# Patient Record
Sex: FEMALE | Race: BLACK OR AFRICAN AMERICAN | NOT HISPANIC OR LATINO | Employment: FULL TIME | URBAN - METROPOLITAN AREA
[De-identification: names, ages, dates, MRNs, and addresses within clinical notes are randomized per-mention and may not be internally consistent; named-entity substitution may affect disease eponyms.]

---

## 2017-03-06 ENCOUNTER — COMMUNICATION - HEALTHEAST (OUTPATIENT)
Dept: FAMILY MEDICINE | Facility: CLINIC | Age: 32
End: 2017-03-06

## 2017-05-19 ENCOUNTER — OFFICE VISIT - HEALTHEAST (OUTPATIENT)
Dept: FAMILY MEDICINE | Facility: CLINIC | Age: 32
End: 2017-05-19

## 2017-05-19 DIAGNOSIS — E55.9 VITAMIN D DEFICIENCY: ICD-10-CM

## 2017-05-19 DIAGNOSIS — E78.5 HYPERLIPIDEMIA: ICD-10-CM

## 2017-05-19 DIAGNOSIS — Z23 IMMUNIZATION DUE: ICD-10-CM

## 2017-05-19 LAB
CHOLEST SERPL-MCNC: 220 MG/DL
FASTING STATUS PATIENT QL REPORTED: YES
HDLC SERPL-MCNC: 36 MG/DL
LDLC SERPL CALC-MCNC: 161 MG/DL
TRIGL SERPL-MCNC: 115 MG/DL

## 2017-06-12 ENCOUNTER — COMMUNICATION - HEALTHEAST (OUTPATIENT)
Dept: FAMILY MEDICINE | Facility: CLINIC | Age: 32
End: 2017-06-12

## 2017-11-24 ENCOUNTER — OFFICE VISIT - HEALTHEAST (OUTPATIENT)
Dept: FAMILY MEDICINE | Facility: CLINIC | Age: 32
End: 2017-11-24

## 2017-11-24 DIAGNOSIS — Z00.00 HEALTH CARE MAINTENANCE: ICD-10-CM

## 2017-11-24 ASSESSMENT — MIFFLIN-ST. JEOR: SCORE: 1886.13

## 2017-12-11 ENCOUNTER — COMMUNICATION - HEALTHEAST (OUTPATIENT)
Dept: FAMILY MEDICINE | Facility: CLINIC | Age: 32
End: 2017-12-11

## 2017-12-11 ENCOUNTER — AMBULATORY - HEALTHEAST (OUTPATIENT)
Dept: FAMILY MEDICINE | Facility: CLINIC | Age: 32
End: 2017-12-11

## 2017-12-11 DIAGNOSIS — Z31.41 FERTILITY TESTING: ICD-10-CM

## 2018-01-02 ENCOUNTER — RECORDS - HEALTHEAST (OUTPATIENT)
Dept: ADMINISTRATIVE | Facility: OTHER | Age: 33
End: 2018-01-02

## 2018-01-05 ENCOUNTER — RECORDS - HEALTHEAST (OUTPATIENT)
Dept: ADMINISTRATIVE | Facility: OTHER | Age: 33
End: 2018-01-05

## 2018-01-07 ENCOUNTER — RECORDS - HEALTHEAST (OUTPATIENT)
Dept: LAB | Facility: CLINIC | Age: 33
End: 2018-01-07

## 2018-01-07 LAB
ESTRADIOL SERPL-MCNC: 21 PG/ML
FSH SERPL-ACNC: 3.9 MIU/ML
PROLACTIN SERPL-MCNC: 9.7 NG/ML (ref 0–20)

## 2018-01-12 ENCOUNTER — HOSPITAL ENCOUNTER (OUTPATIENT)
Dept: RADIOLOGY | Facility: CLINIC | Age: 33
Discharge: HOME | End: 2018-01-12
Attending: OBSTETRICS & GYNECOLOGY

## 2018-01-12 DIAGNOSIS — Z31.41 ENCOUNTER FOR FERTILITY TESTING: ICD-10-CM

## 2018-01-23 ENCOUNTER — COMMUNICATION - HEALTHEAST (OUTPATIENT)
Dept: FAMILY MEDICINE | Facility: CLINIC | Age: 33
End: 2018-01-23

## 2018-01-25 ENCOUNTER — RECORDS - HEALTHEAST (OUTPATIENT)
Dept: ADMINISTRATIVE | Facility: OTHER | Age: 33
End: 2018-01-25

## 2018-01-26 ENCOUNTER — COMMUNICATION - HEALTHEAST (OUTPATIENT)
Dept: ADMINISTRATIVE | Facility: CLINIC | Age: 33
End: 2018-01-26

## 2018-01-26 DIAGNOSIS — N97.9 INFERTILITY, FEMALE: ICD-10-CM

## 2018-02-22 ENCOUNTER — RECORDS - HEALTHEAST (OUTPATIENT)
Dept: ADMINISTRATIVE | Facility: OTHER | Age: 33
End: 2018-02-22

## 2018-04-16 ENCOUNTER — COMMUNICATION - HEALTHEAST (OUTPATIENT)
Dept: ADMINISTRATIVE | Facility: CLINIC | Age: 33
End: 2018-04-16

## 2018-07-16 ENCOUNTER — OFFICE VISIT - HEALTHEAST (OUTPATIENT)
Dept: FAMILY MEDICINE | Facility: CLINIC | Age: 33
End: 2018-07-16

## 2018-07-16 DIAGNOSIS — J32.9 SINUSITIS: ICD-10-CM

## 2018-07-16 LAB — DEPRECATED S PYO AG THROAT QL EIA: NORMAL

## 2018-07-17 LAB — GROUP A STREP BY PCR: NORMAL

## 2018-09-06 ENCOUNTER — RECORDS - HEALTHEAST (OUTPATIENT)
Dept: ADMINISTRATIVE | Facility: OTHER | Age: 33
End: 2018-09-06

## 2018-11-09 ENCOUNTER — COMMUNICATION - HEALTHEAST (OUTPATIENT)
Dept: FAMILY MEDICINE | Facility: CLINIC | Age: 33
End: 2018-11-09

## 2019-01-04 ENCOUNTER — OFFICE VISIT - HEALTHEAST (OUTPATIENT)
Dept: FAMILY MEDICINE | Facility: CLINIC | Age: 34
End: 2019-01-04

## 2019-01-04 ENCOUNTER — TRANSFERRED RECORDS (OUTPATIENT)
Dept: HEALTH INFORMATION MANAGEMENT | Facility: CLINIC | Age: 34
End: 2019-01-04

## 2019-01-04 DIAGNOSIS — Z12.4 SCREENING FOR MALIGNANT NEOPLASM OF CERVIX: ICD-10-CM

## 2019-01-04 DIAGNOSIS — Z00.00 ROUTINE HEALTH MAINTENANCE: ICD-10-CM

## 2019-01-04 DIAGNOSIS — Z13.220 SCREENING FOR LIPID DISORDERS: ICD-10-CM

## 2019-01-04 DIAGNOSIS — Z13.1 SCREENING FOR DIABETES MELLITUS: ICD-10-CM

## 2019-01-04 ASSESSMENT — MIFFLIN-ST. JEOR: SCORE: 1867.08

## 2019-01-07 LAB
HPV SOURCE: NORMAL
HUMAN PAPILLOMA VIRUS 16 DNA: NEGATIVE
HUMAN PAPILLOMA VIRUS 18 DNA: NEGATIVE
HUMAN PAPILLOMA VIRUS FINAL DIAGNOSIS: NORMAL
HUMAN PAPILLOMA VIRUS OTHER HR: NEGATIVE
SPECIMEN DESCRIPTION: NORMAL

## 2019-01-29 ENCOUNTER — AMBULATORY - HEALTHEAST (OUTPATIENT)
Dept: LAB | Facility: CLINIC | Age: 34
End: 2019-01-29

## 2019-01-29 DIAGNOSIS — Z13.1 SCREENING FOR DIABETES MELLITUS: ICD-10-CM

## 2019-01-29 DIAGNOSIS — Z13.220 SCREENING FOR LIPID DISORDERS: ICD-10-CM

## 2019-01-29 LAB
ANION GAP SERPL CALCULATED.3IONS-SCNC: 10 MMOL/L (ref 5–18)
BUN SERPL-MCNC: 9 MG/DL (ref 8–22)
CALCIUM SERPL-MCNC: 9.6 MG/DL (ref 8.5–10.5)
CHLORIDE BLD-SCNC: 103 MMOL/L (ref 98–107)
CHOLEST SERPL-MCNC: 210 MG/DL
CO2 SERPL-SCNC: 26 MMOL/L (ref 22–31)
CREAT SERPL-MCNC: 0.82 MG/DL (ref 0.6–1.1)
FASTING STATUS PATIENT QL REPORTED: YES
GFR SERPL CREATININE-BSD FRML MDRD: >60 ML/MIN/1.73M2
GLUCOSE BLD-MCNC: 96 MG/DL (ref 70–125)
HDLC SERPL-MCNC: 41 MG/DL
LDLC SERPL CALC-MCNC: 142 MG/DL
POTASSIUM BLD-SCNC: 4.3 MMOL/L (ref 3.5–5)
SODIUM SERPL-SCNC: 139 MMOL/L (ref 136–145)
TRIGL SERPL-MCNC: 134 MG/DL

## 2019-03-01 ENCOUNTER — OFFICE VISIT - HEALTHEAST (OUTPATIENT)
Dept: FAMILY MEDICINE | Facility: CLINIC | Age: 34
End: 2019-03-01

## 2019-03-01 DIAGNOSIS — N97.9 FEMALE INFERTILITY: ICD-10-CM

## 2019-03-01 DIAGNOSIS — Z01.818 ENCOUNTER FOR PREOPERATIVE EXAMINATION FOR GENERAL SURGICAL PROCEDURE: ICD-10-CM

## 2019-03-01 LAB — HGB BLD-MCNC: 13.8 G/DL (ref 12–16)

## 2019-03-18 ASSESSMENT — MIFFLIN-ST. JEOR: SCORE: 1880.23

## 2019-03-19 ENCOUNTER — TRANSFERRED RECORDS (OUTPATIENT)
Dept: HEALTH INFORMATION MANAGEMENT | Facility: CLINIC | Age: 34
End: 2019-03-19

## 2019-03-19 ENCOUNTER — ANESTHESIA - HEALTHEAST (OUTPATIENT)
Dept: SURGERY | Facility: CLINIC | Age: 34
End: 2019-03-19

## 2019-03-19 ENCOUNTER — SURGERY - HEALTHEAST (OUTPATIENT)
Dept: SURGERY | Facility: CLINIC | Age: 34
End: 2019-03-19

## 2019-03-19 ASSESSMENT — MIFFLIN-ST. JEOR: SCORE: 1880.23

## 2019-05-12 ENCOUNTER — HOSPITAL ENCOUNTER (OUTPATIENT)
Dept: ULTRASOUND IMAGING | Facility: CLINIC | Age: 34
Discharge: HOME | End: 2019-05-12
Attending: FAMILY MEDICINE

## 2019-05-12 ENCOUNTER — OFFICE VISIT - HEALTHEAST (OUTPATIENT)
Dept: FAMILY MEDICINE | Facility: CLINIC | Age: 34
End: 2019-05-12

## 2019-05-12 DIAGNOSIS — M79.661 RIGHT CALF PAIN: ICD-10-CM

## 2019-06-27 ENCOUNTER — RECORDS - HEALTHEAST (OUTPATIENT)
Dept: ADMINISTRATIVE | Facility: OTHER | Age: 34
End: 2019-06-27

## 2019-06-27 LAB
ALT SERPL W/O P-5'-P-CCNC: 13 U/L (ref 6–29)
AST SERPL-CCNC: 12 U/L (ref 10–30)
CREAT SERPL-MCNC: 0.78 MG/DL (ref 0.5–1.1)
GFR ESTIMATE EXT - HISTORICAL: 101 ML/MIN/1.73M2
GFR ESTIMATE, IF BLACK EXT - HISTORICAL: 117 ML/MIN/1.73M2
HBA1C MFR BLD: 5.8 % (ref 0–5.6)
HIV 1&2 EXT: NORMAL

## 2019-06-28 LAB
C TRACH DNA SPEC QL PROBE+SIG AMP: NOT DETECTED
HBV SURFACE AG SERPL QL IA: NONREACTIVE
HCT VFR BLD AUTO: 41.7 %
HEMOGLOBIN: 14 G/DL (ref 11.7–15.7)
HIV 1+2 AB+HIV1 P24 AG SERPL QL IA: NONREACTIVE
N GONORRHOEA DNA SPEC QL PROBE+SIG AMP: NOT DETECTED
PLATELET # BLD AUTO: 352 10^9/L
RUBELLA ABY IGG: NORMAL
RUBELLA ANTIBODY IGG QUANTITATIVE: 2.53 IU/ML
TREPONEMA ANTIBODIES: NONREACTIVE
TSH SERPL-ACNC: 0.76 MCU/ML

## 2019-07-19 ENCOUNTER — AMBULATORY - HEALTHEAST (OUTPATIENT)
Dept: MATERNAL FETAL MEDICINE | Facility: HOSPITAL | Age: 34
End: 2019-07-19

## 2019-07-19 DIAGNOSIS — O26.90 PREGNANCY, ANTEPARTUM, COMPLICATIONS: ICD-10-CM

## 2019-08-16 ENCOUNTER — RECORDS - HEALTHEAST (OUTPATIENT)
Dept: ADMINISTRATIVE | Facility: OTHER | Age: 34
End: 2019-08-16

## 2019-08-30 ENCOUNTER — AMBULATORY - HEALTHEAST (OUTPATIENT)
Dept: MATERNAL FETAL MEDICINE | Facility: HOSPITAL | Age: 34
End: 2019-08-30

## 2019-09-04 ENCOUNTER — ANESTHESIA (OUTPATIENT)
Dept: OBGYN | Facility: CLINIC | Age: 34
End: 2019-09-04
Payer: COMMERCIAL

## 2019-09-04 ENCOUNTER — RECORDS - HEALTHEAST (OUTPATIENT)
Dept: ADMINISTRATIVE | Facility: OTHER | Age: 34
End: 2019-09-04

## 2019-09-04 ENCOUNTER — ANESTHESIA EVENT (OUTPATIENT)
Dept: OBGYN | Facility: CLINIC | Age: 34
End: 2019-09-04
Payer: COMMERCIAL

## 2019-09-04 ENCOUNTER — HOSPITAL ENCOUNTER (OUTPATIENT)
Facility: CLINIC | Age: 34
Setting detail: OBSERVATION
Discharge: HOME | End: 2019-09-05
Attending: OBSTETRICS & GYNECOLOGY | Admitting: OBSTETRICS & GYNECOLOGY
Payer: COMMERCIAL

## 2019-09-04 ENCOUNTER — OFFICE VISIT - HEALTHEAST (OUTPATIENT)
Dept: MATERNAL FETAL MEDICINE | Facility: HOSPITAL | Age: 34
End: 2019-09-04

## 2019-09-04 ENCOUNTER — RECORDS - HEALTHEAST (OUTPATIENT)
Dept: ULTRASOUND IMAGING | Facility: HOSPITAL | Age: 34
End: 2019-09-04

## 2019-09-04 DIAGNOSIS — O34.30 SHIRODKAR CERCLAGE PRESENT: Primary | ICD-10-CM

## 2019-09-04 DIAGNOSIS — O09.812 PREGNANCY RESULTING FROM IN VITRO FERTILIZATION IN SECOND TRIMESTER: ICD-10-CM

## 2019-09-04 DIAGNOSIS — O26.90 PREGNANCY RELATED CONDITIONS, UNSPECIFIED, UNSPECIFIED TRIMESTER: ICD-10-CM

## 2019-09-04 DIAGNOSIS — O34.32 CERVICAL INSUFFICIENCY DURING PREGNANCY IN SECOND TRIMESTER, ANTEPARTUM: ICD-10-CM

## 2019-09-04 PROBLEM — O26.879 SHORT CERVIX AFFECTING PREGNANCY: Status: ACTIVE | Noted: 2019-09-04

## 2019-09-04 PROBLEM — Z36.89 ENCOUNTER FOR TRIAGE IN PREGNANT PATIENT: Status: ACTIVE | Noted: 2019-09-04

## 2019-09-04 LAB
ABO + RH BLD: NORMAL
ABO + RH BLD: NORMAL
ALBUMIN UR-MCNC: NEGATIVE MG/DL
APPEARANCE UR: CLEAR
BASOPHILS # BLD AUTO: 0 10E9/L (ref 0–0.2)
BASOPHILS NFR BLD AUTO: 0.1 %
BILIRUB UR QL STRIP: NEGATIVE
BLD GP AB SCN SERPL QL: NORMAL
BLOOD BANK CMNT PATIENT-IMP: NORMAL
COLOR UR AUTO: ABNORMAL
DIFFERENTIAL METHOD BLD: NORMAL
EOSINOPHIL # BLD AUTO: 0 10E9/L (ref 0–0.7)
EOSINOPHIL NFR BLD AUTO: 0.4 %
ERYTHROCYTE [DISTWIDTH] IN BLOOD BY AUTOMATED COUNT: 13.8 % (ref 10–15)
GLUCOSE UR STRIP-MCNC: NEGATIVE MG/DL
HCT VFR BLD AUTO: 40.6 % (ref 35–47)
HGB BLD-MCNC: 13 G/DL (ref 11.7–15.7)
HGB UR QL STRIP: NEGATIVE
IMM GRANULOCYTES # BLD: 0 10E9/L (ref 0–0.4)
IMM GRANULOCYTES NFR BLD: 0.3 %
KETONES UR STRIP-MCNC: NEGATIVE MG/DL
LEUKOCYTE ESTERASE UR QL STRIP: NEGATIVE
LYMPHOCYTES # BLD AUTO: 1.8 10E9/L (ref 0.8–5.3)
LYMPHOCYTES NFR BLD AUTO: 17.3 %
MCH RBC QN AUTO: 29.3 PG (ref 26.5–33)
MCHC RBC AUTO-ENTMCNC: 32 G/DL (ref 31.5–36.5)
MCV RBC AUTO: 92 FL (ref 78–100)
MONOCYTES # BLD AUTO: 0.6 10E9/L (ref 0–1.3)
MONOCYTES NFR BLD AUTO: 6.1 %
NEUTROPHILS # BLD AUTO: 7.9 10E9/L (ref 1.6–8.3)
NEUTROPHILS NFR BLD AUTO: 75.8 %
NITRATE UR QL: NEGATIVE
NRBC # BLD AUTO: 0 10*3/UL
NRBC BLD AUTO-RTO: 0 /100
PH UR STRIP: 7.5 PH (ref 5–7)
PLATELET # BLD AUTO: 290 10E9/L (ref 150–450)
RBC # BLD AUTO: 4.43 10E12/L (ref 3.8–5.2)
RBC #/AREA URNS AUTO: 0 /HPF (ref 0–2)
SOURCE: ABNORMAL
SP GR UR STRIP: 1 (ref 1–1.03)
SPECIMEN EXP DATE BLD: NORMAL
SPECIMEN SOURCE: NORMAL
UROBILINOGEN UR STRIP-MCNC: NORMAL MG/DL (ref 0–2)
WBC # BLD AUTO: 10.4 10E9/L (ref 4–11)
WBC #/AREA URNS AUTO: <1 /HPF (ref 0–5)
WET PREP SPEC: NORMAL

## 2019-09-04 PROCEDURE — 71000014 ZZH RECOVERY PHASE 1 LEVEL 2 FIRST HR: Performed by: OBSTETRICS & GYNECOLOGY

## 2019-09-04 PROCEDURE — 25000128 H RX IP 250 OP 636: Performed by: STUDENT IN AN ORGANIZED HEALTH CARE EDUCATION/TRAINING PROGRAM

## 2019-09-04 PROCEDURE — 25800030 ZZH RX IP 258 OP 636: Performed by: STUDENT IN AN ORGANIZED HEALTH CARE EDUCATION/TRAINING PROGRAM

## 2019-09-04 PROCEDURE — 86900 BLOOD TYPING SEROLOGIC ABO: CPT | Performed by: STUDENT IN AN ORGANIZED HEALTH CARE EDUCATION/TRAINING PROGRAM

## 2019-09-04 PROCEDURE — 27211024 ZZHC OR SUPPLY OTHER OPNP: Performed by: OBSTETRICS & GYNECOLOGY

## 2019-09-04 PROCEDURE — 25800030 ZZH RX IP 258 OP 636: Performed by: OBSTETRICS & GYNECOLOGY

## 2019-09-04 PROCEDURE — 87491 CHLMYD TRACH DNA AMP PROBE: CPT | Performed by: STUDENT IN AN ORGANIZED HEALTH CARE EDUCATION/TRAINING PROGRAM

## 2019-09-04 PROCEDURE — 81001 URINALYSIS AUTO W/SCOPE: CPT | Performed by: STUDENT IN AN ORGANIZED HEALTH CARE EDUCATION/TRAINING PROGRAM

## 2019-09-04 PROCEDURE — 25800030 ZZH RX IP 258 OP 636

## 2019-09-04 PROCEDURE — 96360 HYDRATION IV INFUSION INIT: CPT | Mod: 59

## 2019-09-04 PROCEDURE — 25000132 ZZH RX MED GY IP 250 OP 250 PS 637: Performed by: STUDENT IN AN ORGANIZED HEALTH CARE EDUCATION/TRAINING PROGRAM

## 2019-09-04 PROCEDURE — 36000047 ZZH SURGERY LEVEL 1 EA 15 ADDTL MIN - UMMC: Performed by: OBSTETRICS & GYNECOLOGY

## 2019-09-04 PROCEDURE — 87210 SMEAR WET MOUNT SALINE/INK: CPT | Performed by: STUDENT IN AN ORGANIZED HEALTH CARE EDUCATION/TRAINING PROGRAM

## 2019-09-04 PROCEDURE — 25000132 ZZH RX MED GY IP 250 OP 250 PS 637: Performed by: OBSTETRICS & GYNECOLOGY

## 2019-09-04 PROCEDURE — 25000128 H RX IP 250 OP 636: Performed by: OBSTETRICS & GYNECOLOGY

## 2019-09-04 PROCEDURE — 86850 RBC ANTIBODY SCREEN: CPT | Performed by: STUDENT IN AN ORGANIZED HEALTH CARE EDUCATION/TRAINING PROGRAM

## 2019-09-04 PROCEDURE — 25000128 H RX IP 250 OP 636

## 2019-09-04 PROCEDURE — 87086 URINE CULTURE/COLONY COUNT: CPT | Performed by: STUDENT IN AN ORGANIZED HEALTH CARE EDUCATION/TRAINING PROGRAM

## 2019-09-04 PROCEDURE — 87591 N.GONORRHOEAE DNA AMP PROB: CPT | Performed by: STUDENT IN AN ORGANIZED HEALTH CARE EDUCATION/TRAINING PROGRAM

## 2019-09-04 PROCEDURE — G0378 HOSPITAL OBSERVATION PER HR: HCPCS

## 2019-09-04 PROCEDURE — 86901 BLOOD TYPING SEROLOGIC RH(D): CPT | Performed by: STUDENT IN AN ORGANIZED HEALTH CARE EDUCATION/TRAINING PROGRAM

## 2019-09-04 PROCEDURE — 71000015 ZZH RECOVERY PHASE 1 LEVEL 2 EA ADDTL HR: Performed by: OBSTETRICS & GYNECOLOGY

## 2019-09-04 PROCEDURE — 36000045 ZZH SURGERY LEVEL 1 1ST 30 MIN - UMMC: Performed by: OBSTETRICS & GYNECOLOGY

## 2019-09-04 PROCEDURE — 40000170 ZZH STATISTIC PRE-PROCEDURE ASSESSMENT II: Performed by: OBSTETRICS & GYNECOLOGY

## 2019-09-04 PROCEDURE — 36415 COLL VENOUS BLD VENIPUNCTURE: CPT | Performed by: STUDENT IN AN ORGANIZED HEALTH CARE EDUCATION/TRAINING PROGRAM

## 2019-09-04 PROCEDURE — 27210794 ZZH OR GENERAL SUPPLY STERILE: Performed by: OBSTETRICS & GYNECOLOGY

## 2019-09-04 PROCEDURE — 37000009 ZZH ANESTHESIA TECHNICAL FEE, EACH ADDTL 15 MIN: Performed by: OBSTETRICS & GYNECOLOGY

## 2019-09-04 PROCEDURE — 37000008 ZZH ANESTHESIA TECHNICAL FEE, 1ST 30 MIN: Performed by: OBSTETRICS & GYNECOLOGY

## 2019-09-04 PROCEDURE — 85025 COMPLETE CBC W/AUTO DIFF WBC: CPT | Performed by: STUDENT IN AN ORGANIZED HEALTH CARE EDUCATION/TRAINING PROGRAM

## 2019-09-04 RX ORDER — ONDANSETRON 4 MG/1
4 TABLET, ORALLY DISINTEGRATING ORAL EVERY 30 MIN PRN
Status: DISCONTINUED | OUTPATIENT
Start: 2019-09-04 | End: 2019-09-04 | Stop reason: HOSPADM

## 2019-09-04 RX ORDER — ONDANSETRON 2 MG/ML
4 INJECTION INTRAMUSCULAR; INTRAVENOUS EVERY 30 MIN PRN
Status: DISCONTINUED | OUTPATIENT
Start: 2019-09-04 | End: 2019-09-04 | Stop reason: HOSPADM

## 2019-09-04 RX ORDER — INDOMETHACIN 25 MG/1
25 CAPSULE ORAL EVERY 6 HOURS
Status: DISCONTINUED | OUTPATIENT
Start: 2019-09-05 | End: 2019-09-05 | Stop reason: HOSPADM

## 2019-09-04 RX ORDER — PRENATAL VIT/IRON FUM/FOLIC AC 27MG-0.8MG
1 TABLET ORAL DAILY
Status: DISCONTINUED | OUTPATIENT
Start: 2019-09-04 | End: 2019-09-05 | Stop reason: HOSPADM

## 2019-09-04 RX ORDER — ACETAMINOPHEN 325 MG/1
650 TABLET ORAL EVERY 4 HOURS PRN
Status: DISCONTINUED | OUTPATIENT
Start: 2019-09-04 | End: 2019-09-05 | Stop reason: HOSPADM

## 2019-09-04 RX ORDER — LIDOCAINE 40 MG/G
CREAM TOPICAL
Status: DISCONTINUED | OUTPATIENT
Start: 2019-09-04 | End: 2019-09-04 | Stop reason: HOSPADM

## 2019-09-04 RX ORDER — SODIUM CHLORIDE, SODIUM LACTATE, POTASSIUM CHLORIDE, CALCIUM CHLORIDE 600; 310; 30; 20 MG/100ML; MG/100ML; MG/100ML; MG/100ML
INJECTION, SOLUTION INTRAVENOUS CONTINUOUS PRN
Status: DISCONTINUED | OUTPATIENT
Start: 2019-09-04 | End: 2019-09-04

## 2019-09-04 RX ORDER — ONDANSETRON 2 MG/ML
4 INJECTION INTRAMUSCULAR; INTRAVENOUS EVERY 6 HOURS PRN
Status: DISCONTINUED | OUTPATIENT
Start: 2019-09-04 | End: 2019-09-05 | Stop reason: HOSPADM

## 2019-09-04 RX ORDER — SODIUM CHLORIDE, SODIUM LACTATE, POTASSIUM CHLORIDE, CALCIUM CHLORIDE 600; 310; 30; 20 MG/100ML; MG/100ML; MG/100ML; MG/100ML
INJECTION, SOLUTION INTRAVENOUS
Status: COMPLETED
Start: 2019-09-04 | End: 2019-09-04

## 2019-09-04 RX ORDER — INDOMETHACIN 25 MG/1
50 CAPSULE ORAL ONCE
Status: COMPLETED | OUTPATIENT
Start: 2019-09-04 | End: 2019-09-04

## 2019-09-04 RX ORDER — CITRIC ACID/SODIUM CITRATE 334-500MG
30 SOLUTION, ORAL ORAL ONCE
Status: COMPLETED | OUTPATIENT
Start: 2019-09-04 | End: 2019-09-04

## 2019-09-04 RX ORDER — SODIUM CHLORIDE, SODIUM LACTATE, POTASSIUM CHLORIDE, CALCIUM CHLORIDE 600; 310; 30; 20 MG/100ML; MG/100ML; MG/100ML; MG/100ML
INJECTION, SOLUTION INTRAVENOUS CONTINUOUS
Status: DISCONTINUED | OUTPATIENT
Start: 2019-09-04 | End: 2019-09-04

## 2019-09-04 RX ORDER — SODIUM CHLORIDE, SODIUM LACTATE, POTASSIUM CHLORIDE, CALCIUM CHLORIDE 600; 310; 30; 20 MG/100ML; MG/100ML; MG/100ML; MG/100ML
INJECTION, SOLUTION INTRAVENOUS CONTINUOUS
Status: DISCONTINUED | OUTPATIENT
Start: 2019-09-04 | End: 2019-09-04 | Stop reason: HOSPADM

## 2019-09-04 RX ORDER — CEFAZOLIN SODIUM 2 G/100ML
INJECTION, SOLUTION INTRAVENOUS PRN
Status: DISCONTINUED | OUTPATIENT
Start: 2019-09-04 | End: 2019-09-04

## 2019-09-04 RX ORDER — BUPIVACAINE HYDROCHLORIDE 7.5 MG/ML
INJECTION, SOLUTION INTRASPINAL PRN
Status: DISCONTINUED | OUTPATIENT
Start: 2019-09-04 | End: 2019-09-04

## 2019-09-04 RX ORDER — CHOLECALCIFEROL (VITAMIN D3) 50 MCG
1 TABLET ORAL DAILY
COMMUNITY

## 2019-09-04 RX ORDER — INDOMETHACIN 50 MG/1
50 SUPPOSITORY RECTAL ONCE
Status: DISCONTINUED | OUTPATIENT
Start: 2019-09-04 | End: 2019-09-04

## 2019-09-04 RX ORDER — PRENATAL VIT/IRON FUM/FOLIC AC 27MG-0.8MG
1 TABLET ORAL DAILY
COMMUNITY
End: 2024-01-31

## 2019-09-04 RX ADMIN — INDOMETHACIN 50 MG: 25 CAPSULE ORAL at 19:22

## 2019-09-04 RX ADMIN — SODIUM CHLORIDE, POTASSIUM CHLORIDE, SODIUM LACTATE AND CALCIUM CHLORIDE: 600; 310; 30; 20 INJECTION, SOLUTION INTRAVENOUS at 16:45

## 2019-09-04 RX ADMIN — CEFAZOLIN SODIUM 2 G: 2 INJECTION, SOLUTION INTRAVENOUS at 17:15

## 2019-09-04 RX ADMIN — SODIUM CHLORIDE, POTASSIUM CHLORIDE, SODIUM LACTATE AND CALCIUM CHLORIDE 1000 ML: 600; 310; 30; 20 INJECTION, SOLUTION INTRAVENOUS at 13:02

## 2019-09-04 RX ADMIN — AZITHROMYCIN MONOHYDRATE 500 MG: 500 INJECTION, POWDER, LYOPHILIZED, FOR SOLUTION INTRAVENOUS at 17:59

## 2019-09-04 RX ADMIN — ACETAMINOPHEN 650 MG: 325 TABLET ORAL at 20:29

## 2019-09-04 RX ADMIN — PHENYLEPHRINE HYDROCHLORIDE 50 MCG: 10 INJECTION INTRAVENOUS at 17:04

## 2019-09-04 RX ADMIN — BUPIVACAINE HYDROCHLORIDE IN DEXTROSE 1.2 ML: 7.5 INJECTION, SOLUTION SUBARACHNOID at 16:55

## 2019-09-04 RX ADMIN — PHENYLEPHRINE HYDROCHLORIDE 50 MCG: 10 INJECTION INTRAVENOUS at 17:00

## 2019-09-04 RX ADMIN — SODIUM CITRATE AND CITRIC ACID MONOHYDRATE 30 ML: 500; 334 SOLUTION ORAL at 16:32

## 2019-09-04 ASSESSMENT — ACTIVITIES OF DAILY LIVING (ADL)
SWALLOWING: 0-->SWALLOWS FOODS/LIQUIDS WITHOUT DIFFICULTY
TOILETING: 0-->INDEPENDENT
DRESS: 0-->INDEPENDENT
TRANSFERRING: 0-->INDEPENDENT
FALL_HISTORY_WITHIN_LAST_SIX_MONTHS: NO
AMBULATION: 0-->INDEPENDENT
BATHING: 0-->INDEPENDENT
RETIRED_EATING: 0-->INDEPENDENT
RETIRED_COMMUNICATION: 0-->UNDERSTANDS/COMMUNICATES WITHOUT DIFFICULTY
COGNITION: 0 - NO COGNITION ISSUES REPORTED

## 2019-09-04 ASSESSMENT — MIFFLIN-ST. JEOR: SCORE: 1949.3

## 2019-09-04 NOTE — ANESTHESIA PREPROCEDURE EVALUATION
Anesthesia Pre-Procedure Evaluation    Patient: Alejandra Moss   MRN:     [id number] Gender:   female   Age:    34 year old :      1985        Preoperative Diagnosis: PREGNANCY   Procedure(s):  CERCLAGE, CERVIX, VAGINAL APPROACH     Past Medical History:   Diagnosis Date     Infertility of tubal origin     presumed due to intraabdominal adhesions     Obesity      Tonsillitis       Past Surgical History:   Procedure Laterality Date     LAPAROTOMY EXPLORATORY       SMALL BOWEL RESECTION       TONSILLECTOMY, ADENOIDECTOMY ADULT, COMBINED  2013    Procedure: COMBINED TONSILLECTOMY, ADENOIDECTOMY ADULT;  TONSILLECTOMY ;  Surgeon: Prateek Sifuentes MD;  Location: [facility] SURGERY            Anesthesia Evaluation     .             ROS/MED HX    ENT/Pulmonary:  - neg pulmonary ROS     Neurologic:  - neg neurologic ROS     Cardiovascular:         METS/Exercise Tolerance:  >4 METS   Hematologic:  - neg hematologic  ROS       Musculoskeletal:         GI/Hepatic: Comment: Hx NEC. Likely difficult dissection (if an operative delivery is needed) given adhesions.        Renal/Genitourinary:         Endo: Comment: Body mass index is 39.94 kg/m .      (+) Obesity, .      Psychiatric:  - neg psychiatric ROS       Infectious Disease:  - neg infectious disease ROS       Malignancy:         Other:                         PHYSICAL EXAM:   Mental Status/Neuro: A/A/O   Airway: Facies: Feasible   Respiratory:   Resp. Effort: Normal      CV:   Rate: Age appropriate   Comments:                      LABS:  CBC:   Lab Results   Component Value Date    WBC 10.4 2019    HGB 13.0 2019    HCT 40.6 2019     2019     BMP: No results found for: NA, POTASSIUM, CHLORIDE, CO2, BUN, CR, GLC  COAGS: No results found for: PTT, INR, FIBR  POC: No results found for: BGM, HCG, HCGS  OTHER: No results found for: PH, LACT, A1C, NICOLE, PHOS, MAG, ALBUMIN, PROTTOTAL, ALT, AST, GGT, ALKPHOS, BILITOTAL, BILIDIRECT,  "LIPASE, AMYLASE, CRISTOBAL, TSH, T4, T3, CRP, SED     Preop Vitals    BP Readings from Last 3 Encounters:   09/04/19 124/84   11/15/13 131/79   09/26/13 138/88    Pulse Readings from Last 3 Encounters:   11/15/13 75   08/25/13 96      Resp Readings from Last 3 Encounters:   09/04/19 20   09/26/13 16   08/25/13 16    SpO2 Readings from Last 3 Encounters:   09/26/13 97%   08/25/13 97%      Temp Readings from Last 1 Encounters:   09/04/19 37  C (98.6  F) (Oral)    Ht Readings from Last 1 Encounters:   09/04/19 1.702 m (5' 7\")      Wt Readings from Last 1 Encounters:   09/04/19 115.7 kg (255 lb)    Estimated body mass index is 39.94 kg/m  as calculated from the following:    Height as of this encounter: 1.702 m (5' 7\").    Weight as of this encounter: 115.7 kg (255 lb).     LDA:  Peripheral IV 09/04/19 Right;Posterior Hand (Active)   Number of days: 0        Assessment:   ASA SCORE: 2    H&P: History and physical reviewed and following examination; no interval change.   Smoking Status:  Non-Smoker/Unknown   NPO Status: NPO Appropriate     Plan:   Anes. Type:  MAC; Spinal   Pre-Medication: None   Induction:  N/a   Airway: Native Airway   Access/Monitoring: PIV   Maintenance: N/a     Postop Plan:   Postop Pain: Regional  Postop Sedation/Airway: Not planned  Disposition: Inpatient/Admit     PONV Management:   Adult Risk Factors: Female, Non-Smoker     CONSENT: Direct conversation   Plan and risks discussed with: Patient                       34y Primip presents for Cerclage @<20 wks EGA w/ dilated cervix noted on clinic ultrasound. BMI 39 but otherwise healthy. Spinal.  ___________________   Ivan Farley MD          Pager: [phone]      "

## 2019-09-04 NOTE — PLAN OF CARE
Data: [facility] at 1030. Reason for maternal/fetal assessment per patient is Eval/Assessment  For cerclage. Patient is a . Prenatal record reviewed.      OB History    Para Term  AB Living   1 0 0 0 0 0   SAB TAB Ectopic Multiple Live Births   0 0 0 0 0      # Outcome Date GA Lbr Mao/2nd Weight Sex Delivery Anes PTL Lv   1 Current               Medical History:   Past Medical History:   Diagnosis Date    Infertility of tubal origin     presumed due to intraabdominal adhesions    Obesity     Tonsillitis    . Gestational Age 19w6d. VSS. Cervix: fingertip dilated.  Fetal movement present. Patient denies cramping, backache, vaginal discharge, pelvic pressure, UTI symptoms, GI problems, bloody show, vaginal bleeding, edema, headache, visual disturbances, epigastric or URQ pain, abdominal pain, rupture of membranes. Support persons  present.  Action: Verbal consent for EFM. Triage assessment completed. Doopler . Uterine assessment no ctxs.   Response: Dr. Bermudez and Dr Dalton informed of pt arrival and status. Plan per provider is prepare for cerclage.

## 2019-09-04 NOTE — ANESTHESIA PROCEDURE NOTES
Spinal/LP Procedure Note    Spinal Block  Staff:     Anesthesiologist:  Hayder Vivar MD  Location: OB and OR  Procedure Start/Stop Times:     patient identified, IV checked, risks and benefits discussed, monitors and equipment checked and pre-op evaluation      Correct Patient: Yes      Correct Position: Yes      Correct Site: Yes      Correct Procedure: Yes      Correct Laterality:  Yes    Site Marked:  Yes  Procedure:     Procedure:  Intrathecal    ASA:  2    Position:  Sitting    Sterile Prep: chloraprep      Insertion site:  L3-4    Approach:  Midline    Needle Type:  Armin    Needle gauge (G):  25    Local Skin Infiltration:  2% lidocaine    amount (ml):  5    Needle Length (in):  3.5    Introducer used: Yes      Introducer gauge:  20 G    Attempts:  1    Redirects:  1    CSF:  Clear    Paresthesias:  No  Assessment/Narrative:      Performed by

## 2019-09-04 NOTE — ANESTHESIA CARE TRANSFER NOTE
Patient: Alejandra Moss    Procedure(s):  CERCLAGE, CERVIX, VAGINAL APPROACH    Diagnosis: PREGNANCY  Diagnosis Additional Information: No value filed.    Anesthesia Type:   MAC, Spinal     Note:  Airway :Room Air  Patient transferred to:PACU  Handoff Report: Identifed the Patient, Identified the Reponsible Provider, Reviewed the pertinent medical history, Discussed the surgical course, Reviewed Intra-OP anesthesia mangement and issues during anesthesia, Set expectations for post-procedure period and Allowed opportunity for questions and acknowledgement of understanding      Vitals: (Last set prior to Anesthesia Care Transfer)    CRNA VITALS  9/4/2019 1741 - 9/4/2019 1811      9/4/2019             Ht Rate:  87                Electronically Signed By: Chun Marquez MD  September 4, 2019  6:11 PM

## 2019-09-04 NOTE — H&P
L&D History and Physical   2019  Alejandra Moss  [id number]      HPI: Alejandra Moss is a 34 year old  at 19w6d by 5 day embryo transfer (19) who presents from clinic for evaluation for possible cerclage placement after diagnosis of cervical dilation on ultrasound. She states that she is feeling well today. She denies any cramping, contractions, abdominal pain, vaginal bleeding, changes in vaginal discharge, urinary sx, fevers, chills, or n/v. States this pregnancy has been uneventful since her embryo transfer.     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     Past Medical History:     Infertility of tubal origin     presumed due to intraabdominal adhesions     Obesity      Tonsillitis      Past Surgical History:   Procedure Laterality Date     LAPAROTOMY EXPLORATORY       SMALL BOWEL RESECTION       TONSILLECTOMY, ADENOIDECTOMY ADULT, COMBINED  2013    Procedure: COMBINED TONSILLECTOMY, ADENOIDECTOMY ADULT;  TONSILLECTOMY ;  Surgeon: Prateek Sifuentes MD;  Location: [facility] SURGERY       Medications:   aspirin 10 mg/mL SUSP Take 325 mg by mouth daily   Prenatal Vit-Fe Fumarate-FA (PRENATAL MULTIVITAMIN W/IRON) 27-0.8 MG tablet Take 1 tablet by mouth daily   vitamin D3 (CHOLECALCIFEROL) 2000 units (50 mcg) tablet Take 1 tablet by mouth daily     No Known Allergies    Family HX:   Non-contributory    SocialHX:   Tobacco Use     Smoking status: Never Smoker     Smokeless tobacco: Never Used   Substance Use Topics     Alcohol use: Not Currently     Comment: 1-2x/week     Drug use: Never       ROS: 10-point ROS negative except as indicated in HPI.    Physical  "Exam:  Vitals:    19 1103   BP: 124/84   Resp: 20   Temp: 98.6  F (37  C)   TempSrc: Oral   Weight: 115.7 kg (255 lb)   Height: 1.702 m (5' 7\")     General: alert, oriented female, resting in bed in NAD  CV: RRR, no murmurs  Lungs: Non-labored breathing, CTAB  Abdomen: soft, gravid, non-tende  Extremities: bilateral lower extremities non-tender with no edema  SSE: Cervix appears visually closed. No vaginal bleeding or pooling of fluid. Small amount of physiologic discharge.   SVE: Fingertip dilated    Membranes: intact    Doptones: 150's  [place]: Quiet    Prenatal ultrasounds:  Anatomy US today:  EFW 382g, normal anatomy  Cervical funneling noted without measurable cervical length    Prenatal Labs:   Awaiting prenatal records    Labs:   Wet prep, GC/Chlam, UA, UCx, CBC, T&S pending    Assessment:   Alejandra Moss is a 24 year old  at 19w6d by 5 day embryo transfer (19) who presents from clinic for evaluation for possible cerclage placement, found to have  cervical dilation on exam. No clinical evidence of infection, ROM, or  labor.     Plan:     cervical dilation, cervical funneling:  - CBC with diff, wet prep, GC/Chlam, UA/UCx pending. No clinical evidence of infection, ROM, or PTL  - Tocometry quiet  - Discussed option of amniocentesis with patient to definitively rule out infection, patient declines  - Counseled patient on options including expectant management, pregnancy termination, or exam indicated cerclage placement due to  cervical dilation. Discussed risks of cerclage including PPROM, infection, injury to surrounding organs including bladder or bowel, and bleeding. Discussed types of cerclage including Workman and Shirodkar and that decision regarding type of cerclage will be made in the OR when more thorough exam can be done. Discussed plan for spinal anesthesia. Patient elects to proceed with cerclage placement. Written consent obtained.    FRANCIE:  Dami " "150's  Plan for postop doptones prior to discharge    PNC:  Awaiting prenatal records      Patient seen and care plan discussed under supervision of Dr. Aidan Dalton MD  Ob/Gyn PGY-3  19 11:19 AM       Maternal-Fetal Medicine Attending Addendum    I have discussed the care of Ms. Moss with the resident.  Patient seen & examined by me.  Agree with above, I wish to note the following:      HPI: Pt is a 34 year old  19w6d presenting with cervical dilation on ultrasound.  Alejandra is asymptomatic.      O:  /84   Temp 98.6  F (37  C) (Oral)   Resp 20   Ht 1.702 m (5' 7\")   Wt 115.7 kg (255 lb)   BMI 39.94 kg/m    GEN:  NAD  ABD: gravid, NT, obese, no fundal tenderness  TOCO:  none    Results for orders placed or performed during the hospital encounter of 19 (from the past 24 hour(s))   Wet prep   Result Value Ref Range    Specimen Description Vagina     Wet Prep No motile Trichomonas seen     Wet Prep No yeast seen     Wet Prep Moderate PMNs seen     Wet Prep No clue cells seen    CBC with platelets differential   Result Value Ref Range    WBC 10.4 4.0 - 11.0 10e9/L    RBC Count 4.43 3.8 - 5.2 10e12/L    Hemoglobin 13.0 11.7 - 15.7 g/dL    Hematocrit 40.6 35.0 - 47.0 %    MCV 92 78 - 100 fl    MCH 29.3 26.5 - 33.0 pg    MCHC 32.0 31.5 - 36.5 g/dL    RDW 13.8 10.0 - 15.0 %    Platelet Count 290 150 - 450 10e9/L    Diff Method Automated Method     % Neutrophils 75.8 %    % Lymphocytes 17.3 %    % Monocytes 6.1 %    % Eosinophils 0.4 %    % Basophils 0.1 %    % Immature Granulocytes 0.3 %    Nucleated RBCs 0 0 /100    Absolute Neutrophil 7.9 1.6 - 8.3 10e9/L    Absolute Lymphocytes 1.8 0.8 - 5.3 10e9/L    Absolute Monocytes 0.6 0.0 - 1.3 10e9/L    Absolute Eosinophils 0.0 0.0 - 0.7 10e9/L    Absolute Basophils 0.0 0.0 - 0.2 10e9/L    Abs Immature Granulocytes 0.0 0 - 0.4 10e9/L    Absolute Nucleated RBC 0.0    ABO/Rh type and screen   Result Value Ref Range    ABO A     RH(D) Pos     " Antibody Screen Neg    Urine Culture Aerobic Bacterial   Result Value Ref Range    Specimen Description Midstream Urine     Culture Micro PENDING    UA with Microscopic   Result Value Ref Range    Color Urine Light Yellow     Appearance Urine Clear     Glucose Urine Negative NEG^Negative mg/dL    Bilirubin Urine Negative NEG^Negative    Ketones Urine Negative NEG^Negative mg/dL    Specific Gravity Urine 1.003 1.003 - 1.035    Blood Urine Negative NEG^Negative    pH Urine 7.5 (H) 5.0 - 7.0 pH    Protein Albumin Urine Negative NEG^Negative mg/dL    Urobilinogen mg/dL Normal 0.0 - 2.0 mg/dL    Nitrite Urine Negative NEG^Negative    Leukocyte Esterase Urine Negative NEG^Negative    Source Midstream Urine     WBC Urine <1 0 - 5 /HPF    RBC Urine 0 0 - 2 /HPF     A/P: Pt is a 34 year old  19w6d presenting with   cervical dilation. No clinical signs of infection or labor.  We discussed the various etiologies of this presentation including cervical insufficiency, occult damage from a prior delivery, infection/inflammation, uterine distention.  We discussed the uncertain outcome with pre-viable, delfina-viable and  birth.  Patient was counseled on her options including active management of pregnancy (i.e. termination), expectant management and cerclage.  We discussed the risk of subclinical intra-amniotic infection in women presenting with dilation.  We discussed the option of amniocentesis prior to cerclage placement to rule out intra-amniotic infection.  We discussed the risks of amniocentesis.  Discussed risks of a cerclage including but not limited to: bleeding (including placental), infection (including chorioamnionitis), damage to surrounding structures including but not limited to bowel, bladder, cervix, risk of PPROM, failure of cerclage to prevent pre-viable, delfina-viable or  birth, possible increased need for  delivery.  We discussed that cerclage works well for cervical dilation due to  insufficiency, but not for other etiologies.  We discussed that some studies suggest an average 4 week latency with exam-indicated cerclage.  We discussed the lowest limit of viability at this hospital (22 weeks) and offered an NICU consultation.      After counseling on options the patient declined termination of pregnancy and opted to proceed with exam-indicated cerclage.  Proceed to OR when appropriate labs have returned and OR available.   Informed consent obtained.  Exam under anesthesia to determine Workman versus Shirodkar.  Indomethacin as needed.      I spent a total of 60 minutes face-to-face or coordinating care of Alejandra Moss.  More than 50% of my time on the unit was spent counseling and/or coordinating care.    Date of service (when I saw the patient): September 4, 2019      Cathryn Bermudez MD  , OB/GYN  Maternal-Fetal Medicine  [email or web address]  [phone] (Academic office)  [phone] (Pager)

## 2019-09-04 NOTE — OP NOTE
Operative Note: Cervical Cerclage         Pre-Op Diagnosis:   - Single intrauterine pregnancy at 19w6d  - Cervical insufficiency with premature cervical dilation         Post-Op Diagnosis:   Same         Procedure:    Exam-indicated Shirodkar cervical cerclage         Surgeons:   Attending: Dr. Cathryn Bermudez MD  Fellow: Amandeep Miller MD   Medical Student: Ambar Chu         Anesthesia:   Spinal          Estimated Blood Loss:   300 cc         Findings:   - Cervix dilated prior to the procedure, on exam under anesthesia 1-2 cm, 90% effaced with membranes flush to the external os  - Cervix closed following the procedure  - Fetal heart tones confirmed by Doptone following the procedure         Specimens:   None         Complications:   None apparent          History:   Alejandra Moss is a 34 year old  at 19w6d here for incidentally found premature cervical dilation.  On preoperative speculum examination the cervix was visually closed, but fingertip dilated on digital exam (performed to confirm dilation).      We discussed management options and indications for cerclage placement and explained the risks of cerclage include but are not limited to bleeding, infection, PPROM, chorioamnionitis, pregnancy loss, VTE. Patient voiced understanding, agreed to procedure and signed consents. Discussed the possibility of requiring a high transvaginal (Shirodkar cerclage) if insufficient cervical tissue. Also had previously discuss the alternatives to cerclage including expectant management/vaginal progesterone and pregnancy termination. Patient requested cerclage.  Informed consent was obtained and signed.           Details of Procedure:     After administration of spinal anesthesia the patient was placed in the dorsal lithotomy position and prepped and draped in the usual fashion. A time out was performed confirming the correct patient and procedure.  After a weighted speculum and right angle retractor was placed  the cervix and vagina were again prepped with betadine under direct visualization.  The cervix was noted to be dilated as described above.  The bladder was drained of clear yellow urine.   2 grams of cefazolin and 500 mg of azithromycin were given for surgical prophylaxis.   Due to the insufficient remaining cervical tissue the decision was made to proceed with a Shirodkar cerclage.      Both anterior and posterior lip of the cervix were grasped with ring forceps.  A cook catheter was placed and instilled with 30 ml of saline to protect the membranes.  Thereafter attention was turned to the anterior cervix.  The vaginal mucosa just distal to the cervicovaginal reflection was incised in a transverse fashion with the electrocautery, approximately 2 cm wide.  The bladder was the dissected off of the anterior cervix bluntly using a moist sponge on the 's digit.  Once the bladder was sufficiently advanced attention was turned to the posterior cervix.  The vaginal mucosa just distal to the cervicovaginal reflection was incised in a transverse fashion with the electrocautery, approximately 2 cm wide.  The rectum was then dissected off of the posterior cervix bluntly using a moist sponge on the 's digit.  Once the rectum was sufficiently advanced long curved Allis clamps were used to grasp and approximate the lateral edges of the anterior and posterior aspects of the transverse incisions and some paracervical tissue.  Thereafter a 5 mm Mersilene stitch was placed at the around the cervix through the paracervical tissue from posterior to anterior bilaterally.  Once the position of the stitch was confirmed to be satisfactory the balloon in the cervix was deflated and removed and the suture was tied with six knots at 12 o'clock and the tail was trimmed to approximately 2 cm. Both the posterior and anterior incisions were hemostatic and therefore were not closed.  At the end the cervix was visually and digitally  closed.  A rectal exam revealed no sutures.  The cervix and vaginal vault were inspected and noted to be free of injury and hemostatic.  The instruments were removed from the vagina.      Fetal heart tones were confirmed after the procedure.   She was taken to the recovery room in stable condition.  Sponge and needle counts were correct at the end of the procedure.     Dr. Bermudez was present during the entire procedure.     As discussed with the primary OB, Ms. Moss will be referred to [facility] at 36-37 weeks for cerclage removal.      Amandeep Jarquinquita  Maternal Fetal Medicine Fellow   9/4/2019    Attending Addendum    I was present and scrubbed and performed with the above described surgery with Dr. Granados.  I agree with the above documentation.  The surgery was indicated and there were no apparent complications.        Cathryn Bermudez MD  , OB/GYN  Maternal-Fetal Medicine  [email or web address]  [phone] (Academic office)  [phone] (Pager)

## 2019-09-05 VITALS
RESPIRATION RATE: 18 BRPM | OXYGEN SATURATION: 100 % | DIASTOLIC BLOOD PRESSURE: 77 MMHG | SYSTOLIC BLOOD PRESSURE: 120 MMHG | WEIGHT: 255 LBS | BODY MASS INDEX: 40.02 KG/M2 | HEART RATE: 82 BPM | HEIGHT: 67 IN | TEMPERATURE: 98.2 F

## 2019-09-05 DIAGNOSIS — O09.812 PREGNANCY RESULTING FROM IN VITRO FERTILIZATION, SECOND TRIMESTER: ICD-10-CM

## 2019-09-05 DIAGNOSIS — O26.879 SHORT CERVIX AFFECTING PREGNANCY: Primary | ICD-10-CM

## 2019-09-05 DIAGNOSIS — O34.32 CERVICAL INSUFFICIENCY DURING PREGNANCY IN SECOND TRIMESTER, ANTEPARTUM: ICD-10-CM

## 2019-09-05 DIAGNOSIS — O34.32 CERVICAL CERCLAGE SUTURE PRESENT IN SECOND TRIMESTER: ICD-10-CM

## 2019-09-05 LAB
BACTERIA SPEC CULT: NORMAL
C TRACH DNA SPEC QL NAA+PROBE: NEGATIVE
Lab: NORMAL
N GONORRHOEA DNA SPEC QL NAA+PROBE: NEGATIVE
SPECIMEN SOURCE: NORMAL

## 2019-09-05 PROCEDURE — 25000132 ZZH RX MED GY IP 250 OP 250 PS 637: Performed by: OBSTETRICS & GYNECOLOGY

## 2019-09-05 PROCEDURE — G0378 HOSPITAL OBSERVATION PER HR: HCPCS

## 2019-09-05 RX ORDER — INDOMETHACIN 25 MG/1
25 CAPSULE ORAL EVERY 6 HOURS
Qty: 2 CAPSULE | Refills: 0 | Status: SHIPPED | OUTPATIENT
Start: 2019-09-05 | End: 2019-11-27

## 2019-09-05 RX ADMIN — PRENATAL VIT W/ FE FUMARATE-FA TAB 27-0.8 MG 1 TABLET: 27-0.8 TAB at 08:06

## 2019-09-05 RX ADMIN — INDOMETHACIN 25 MG: 25 CAPSULE ORAL at 07:05

## 2019-09-05 RX ADMIN — INDOMETHACIN 25 MG: 25 CAPSULE ORAL at 01:10

## 2019-09-05 NOTE — PROGRESS NOTES
"Antepartum Progress Note    Subjective:  She is resting comfortably in bed this morning. She denies any cramping, contractions, or loss of fluid. Had a small amount of pink discharge with wiping today, but no other vaginal bleeding. No fevers, chills, abdominal pain, or other concerns.     Objective:  Vitals:    19 1905 19 1920 19 2030 19 0705   BP: 122/82 125/87  120/77   Pulse: 77 82     Resp: 29 23 18 18   Temp:   98  F (36.7  C) 98.2  F (36.8  C)   TempSrc:   Oral Oral   SpO2:   100%      General: NAD, resting comfortably  Abd: Gravid, soft, non-tender    Assessment/Plan:  Alejandra Moss is a 34 year old  female who is POD#1 s/p exam indicated Shirodkar cerclage placement.      cervical dilation, cervical funneling:  - S/p Shirodkar cerclage  - Continue indomethacin x 24 hours  - Tocometry this morning prior to discharge    FWB:  Dami 150's    PNC:  Setting up follow up comprehensive US and fetal echo  Can return to primary OB for prenatal care  Recommended consult with general surgery due to history of bowel resection and abdominal adhesions    Danae Dalton MD, MD  Obstetrics and Gyncology, PGY-3  2019 , 8:47 AM      Maternal-Fetal Medicine Attending Addendum    I have discussed the care of Ms. Moss with the resident during morning rounds.    /77   Pulse 82   Temp 98.2  F (36.8  C) (Oral)   Resp 18   Ht 1.702 m (5' 7\")   Wt 115.7 kg (255 lb)   SpO2 100%   BMI 39.94 kg/m    GEN: NAD  ABD: gravid, NT, obese  TOCO: quiet    A/P: 34 year old  20w0d POD#1 s/p exam-indicated Shirodkar cerclage.  Stable for discharge home.  Patient agrees with plan of care and all questions answered.     Time attestation on discharge summary.  See note for details; I have made the necessary edits/additions.      Date of service (when I saw the patient): 2019      Cathryn Bermudez MD  , OB/GYN  Maternal-Fetal " Medicine  [email or web address]  [phone] (Academic office)  [phone] (Pager)

## 2019-09-05 NOTE — PROGRESS NOTES
Patient arrived to antepartum unit via zoom cart at 1945,with belongings. Received report from Larry and checked bands. Unit and room orientation completd. Call light within arms reach; no concerns present at this time. Continue with plan of care.

## 2019-09-05 NOTE — DISCHARGE INSTRUCTIONS
Discharge Instruction for Undelivered Patients      You were seen for: cerclage placement  We Consulted: Dr Bermudez  You had (Test or Medicine):Cerclage     Diet:   Drink 8 to 12 glasses of liquids (milk, juice, water) every day.  You may eat meals and snacks.     Activity:  Count fetal kicks everyday (see handout)  Call your doctor or nurse midwife if your baby is moving less than usual.     Call your provider if you notice:  Swelling in your face or increased swelling in your hands or legs.  Headaches that are not relieved by Tylenol (acetaminophen).  Changes in your vision (blurring: seeing spots or stars.)  Nausea (sick to your stomach) and vomiting (throwing up).   Weight gain of 5 pounds or more per week.  Heartburn that doesn't go away.  Signs of bladder infection: pain when you urinate (use the toilet), need to go more often and more urgently.  The bag of augustine (rupture of membranes) breaks, or you notice leaking in your underwear.  Bright red blood in your underwear.  Abdominal (lower belly) or stomach pain.  For first baby: Contractions (tightening) less than 5 minutes apart for one hour or more.  Second (plus) baby: Contractions (tightening) less than 10 minutes apart and getting stronger.  *If less than 34 weeks: Contractions (tightenings) more than 6 times in one hour.  Increase or change in vaginal discharge (note the color and amount)  Other: ***    Follow-up:  As scheduled in the clinic

## 2019-09-05 NOTE — DISCHARGE SUMMARY
[facility] Discharge Summary    Alejandra Moss MRN# [id number]   Age: 34 year old YOB: 1985     Date of Admission:  2019  Date of Discharge::  19   Admitting Physician:  Cathryn Bermudez MD  Discharge Physician:  Cathryn Bermudez MD          Admission Diagnoses:   -  at 19w6d  - Cervical insufficiency with premature cervical dilation  - IVF pregnancy  - Obesity          Discharge Diagnosis:   - Same as above s/p procedure below           Procedures:   Procedure(s): cervical cerclage, Tara ( 1 suture)             Medications Prior to Admission:     Medications Prior to Admission   Medication Sig Dispense Refill Last Dose     aspirin 10 mg/mL SUSP Take 325 mg by mouth daily   9/3/2019 at Unknown time     Prenatal Vit-Fe Fumarate-FA (PRENATAL MULTIVITAMIN W/IRON) 27-0.8 MG tablet Take 1 tablet by mouth daily   9/3/2019 at Unknown time     vitamin D3 (CHOLECALCIFEROL) 2000 units (50 mcg) tablet Take 1 tablet by mouth daily   9/3/2019 at Unknown time             Discharge Medications:        Review of your medicines      CONTINUE these medicines which have NOT CHANGED      Dose / Directions   aspirin 10 mg/mL Susp      Dose:  325 mg  Take 325 mg by mouth daily  Refills:  0     prenatal multivitamin w/iron 27-0.8 MG tablet      Dose:  1 tablet  Take 1 tablet by mouth daily  Refills:  0     vitamin D3 2000 units (50 mcg) tablet  Commonly known as:  CHOLECALCIFEROL      Dose:  1 tablet  Take 1 tablet by mouth daily  Refills:  0        Indocin 25 mg q6h for a total of 24 hours after cerclage          Consultations:     None          Brief Admission History     Alejandra Moss is a 34 year old  at 19w6d by 5 day embryo transfer (19) who presents from clinic for evaluation for possible cerclage placement after diagnosis of cervical dilation on ultrasound. She states that she is feeling well today. She denies any cramping, contractions, abdominal pain, vaginal bleeding,  changes in vaginal discharge, urinary sx, fevers, chills, or n/v. States this pregnancy has been uneventful since her embryo transfer.            Hospital Course:     Alejandra Moss is a 34 year old  at 19w6d by 5 day embryo transfer (19) who presents from clinic for evaluation for possible cerclage placement, found to have  cervical dilation on exam. No clinical evidence of infection, ROM, or  labor. Counseled patient on options including expectant management, pregnancy termination, or exam indicated cerclage placement due to  cervical dilation. Discussed risks of cerclage including PPROM, infection, injury to surrounding organs including bladder or bowel, and bleeding. Discussed types of cerclage including Workman and Shirodkar and that decision regarding type of cerclage will be made in the OR when more thorough exam can be done. Discussed plan for spinal anesthesia. Patient elects to proceed with cerclage placement. Written consent obtained.    Following procedure patient was admitted overnight for observation. She was given indocin for tocolysis. Her hospital stay was uncomplicated. She was discharged home on HD#2 with close follow up. Return precautions reviewed.           Discharge Instructions and Follow-Up:   Discharge diet: Regular   Discharge activity: Activity as tolerated. No heavy exercise, straining. Pelvic rest. Light household activities and exercise such as walking okay.   Discharge follow-up: Follow up with OB provider in the next 1-2 weeks. Follow up for comprehensive anatomy ultrasound and fetal echo on . See MFM for cerclage removal at 36-37 weeks.            Discharge Disposition:   Discharged to home      Danae Dalton MD  Ob/Gyn PGY-3  19 9:01 AM         MFM Attending Addendum    I, Cathryn Bermudez MD, saw and evaluated this patient prior to discharge.  I have discussed the care of Ms. Moss with the medical student/resident/fellow and agree with the  plan of care as documented above.      I personally reviewed vital signs, medications, labs and imaging.      I personally spent a total of 30 minutes with Alejandra Moss on discharge activities including calling guidelines.  Appropriate follow-up in place.  Plan discussed with primary OB.      Date of service (when I saw the patient): September 5, 2019      Cathryn Bermudez MD  , OB/GYN  Maternal-Fetal Medicine  [email or web address]  [phone] (Academic office)  [phone] (Pager)

## 2019-09-05 NOTE — PROGRESS NOTES
VSS. Able to ambulate and empty bladder. Tolerating regular diet and PO fluids. FHR by jerri in 140s. Scant bleeding noted on pad when initially up to bathroom. Noted same amount dried on pad this AM. Will continue to monitor, anticipate discharge today.

## 2019-09-05 NOTE — PLAN OF CARE
Data: Alejandra Moss transferred to 422 via cart at 1930.   Action: Receiving unit notified of transfer: Yes. Patient and family notified of room change. Report given to Yolanda SILVA. Belongings sent to receiving unit. Accompanied by Registered Nurse. Oriented patient to surroundings. Call light within reach.   Response: Patient tolerated transfer and is stable. Drinking and eating well.

## 2019-09-05 NOTE — PLAN OF CARE
After morning's assessment, pt has discharge orders.  Discharge instructions, kick count and Rx provided to pt.  Follow up plans are in place.   Phone numbers provided to pt.  Pt has no further questions.  Discharge to home.

## 2019-09-05 NOTE — ANESTHESIA POSTPROCEDURE EVALUATION
Anesthesia POST Procedure Evaluation    Patient: Alejandra Moss   MRN:     [id number] Gender:   female   Age:    34 year old :      1985        Preoperative Diagnosis: PREGNANCY   Procedure(s):  CERCLAGE, CERVIX, VAGINAL APPROACH   Postop Comments: No value filed.       Anesthesia Type:  Not documented  MAC, Spinal    Reportable Event: NO     PAIN: Uncomplicated   Sign Out status: Comfortable, Well controlled pain     PONV: No PONV   Sign Out status:  No Nausea or Vomiting     Neuro/Psych: Uneventful perioperative course   Sign Out Status: Preoperative baseline; Age appropriate mentation     Airway/Resp.: Uneventful perioperative course   Sign Out Status: Non labored breathing, age appropriate RR; Resp. Status within EXPECTED Parameters     CV: Uneventful perioperative course   Sign Out status: Appropriate BP and perfusion indices; Appropriate HR/Rhythm     Disposition:   Sign Out in:  PACU  Disposition:  Phase II; Home  Recovery Course: Uneventful  Follow-Up: Not required     Comments/Narrative:  Patient doing well post-operatively.  No significant issues.  Hemodynamically stable, pain well controlled, nausea well controlled.  Stable for discharge from the PACU             Last Anesthesia Record Vitals:  CRNA VITALS  2019 1741 - 2019 1841      2019             Ht Rate:  87          Last PACU Vitals:  Vitals Value Taken Time   /87 2019  7:20 PM   Temp 36.3  C (97.4  F) 2019  6:20 PM   Pulse 82 2019  7:20 PM   Resp 22 2019  7:29 PM   SpO2 100 % 2019  7:30 PM   Temp src     NIBP     Pulse     SpO2     Resp     Temp     Ht Rate     Temp 2     Vitals shown include unvalidated device data.      Electronically Signed By: Hayder Vivar MD, 2019, 7:46 PM

## 2019-09-09 ENCOUNTER — TRANSFERRED RECORDS (OUTPATIENT)
Dept: HEALTH INFORMATION MANAGEMENT | Facility: CLINIC | Age: 34
End: 2019-09-09

## 2019-09-09 ENCOUNTER — RECORDS - HEALTHEAST (OUTPATIENT)
Dept: ADMINISTRATIVE | Facility: OTHER | Age: 34
End: 2019-09-09

## 2019-09-25 ENCOUNTER — HOSPITAL ENCOUNTER (OUTPATIENT)
Dept: ULTRASOUND IMAGING | Facility: CLINIC | Age: 34
Discharge: HOME | End: 2019-09-25
Attending: OBSTETRICS & GYNECOLOGY | Admitting: OBSTETRICS & GYNECOLOGY
Payer: COMMERCIAL

## 2019-09-25 ENCOUNTER — HOSPITAL ENCOUNTER (OUTPATIENT)
Dept: CARDIOLOGY | Facility: CLINIC | Age: 34
End: 2019-09-25
Attending: OBSTETRICS & GYNECOLOGY
Payer: COMMERCIAL

## 2019-09-25 ENCOUNTER — OFFICE VISIT (OUTPATIENT)
Dept: MATERNAL FETAL MEDICINE | Facility: CLINIC | Age: 34
End: 2019-09-25
Attending: OBSTETRICS & GYNECOLOGY
Payer: COMMERCIAL

## 2019-09-25 DIAGNOSIS — O09.812 PREGNANCY RESULTING FROM IN VITRO FERTILIZATION, SECOND TRIMESTER: ICD-10-CM

## 2019-09-25 DIAGNOSIS — O34.32 CERVICAL INSUFFICIENCY DURING PREGNANCY IN SECOND TRIMESTER, ANTEPARTUM: ICD-10-CM

## 2019-09-25 DIAGNOSIS — O26.879 SHORT CERVIX AFFECTING PREGNANCY: ICD-10-CM

## 2019-09-25 DIAGNOSIS — O34.32 CERVICAL CERCLAGE SUTURE PRESENT IN SECOND TRIMESTER: ICD-10-CM

## 2019-09-25 DIAGNOSIS — O34.32 CERVICAL INSUFFICIENCY DURING PREGNANCY IN SECOND TRIMESTER, ANTEPARTUM: Primary | ICD-10-CM

## 2019-09-25 PROCEDURE — 76825 ECHO EXAM OF FETAL HEART: CPT

## 2019-09-25 PROCEDURE — 76817 TRANSVAGINAL US OBSTETRIC: CPT | Performed by: OBSTETRICS & GYNECOLOGY

## 2019-09-25 PROCEDURE — 76816 OB US FOLLOW-UP PER FETUS: CPT

## 2019-09-25 NOTE — PROGRESS NOTES
"Please see \"Imaging\" tab under \"Chart Review\" for details of today's US.    Jeanne Peres, DO    "

## 2019-10-09 DIAGNOSIS — O34.32 CERVICAL INSUFFICIENCY DURING PREGNANCY IN SECOND TRIMESTER, ANTEPARTUM: Primary | ICD-10-CM

## 2019-10-13 ENCOUNTER — COMMUNICATION - HEALTHEAST (OUTPATIENT)
Dept: FAMILY MEDICINE | Facility: CLINIC | Age: 34
End: 2019-10-13

## 2019-10-14 ENCOUNTER — HOSPITAL ENCOUNTER (OUTPATIENT)
Facility: CLINIC | Age: 34
End: 2019-10-14
Attending: OBSTETRICS & GYNECOLOGY | Admitting: OBSTETRICS & GYNECOLOGY
Payer: COMMERCIAL

## 2019-10-14 ENCOUNTER — PREP FOR PROCEDURE (OUTPATIENT)
Dept: MATERNAL FETAL MEDICINE | Facility: CLINIC | Age: 34
End: 2019-10-14

## 2019-10-14 DIAGNOSIS — O34.33: ICD-10-CM

## 2019-10-14 DIAGNOSIS — O09.293: ICD-10-CM

## 2019-10-14 DIAGNOSIS — O34.33: Primary | ICD-10-CM

## 2019-10-16 ENCOUNTER — OFFICE VISIT (OUTPATIENT)
Dept: MATERNAL FETAL MEDICINE | Facility: CLINIC | Age: 34
End: 2019-10-16
Attending: OBSTETRICS & GYNECOLOGY
Payer: COMMERCIAL

## 2019-10-16 ENCOUNTER — HOSPITAL ENCOUNTER (OUTPATIENT)
Dept: ULTRASOUND IMAGING | Facility: CLINIC | Age: 34
Discharge: HOME | End: 2019-10-16
Attending: OBSTETRICS & GYNECOLOGY | Admitting: OBSTETRICS & GYNECOLOGY
Payer: COMMERCIAL

## 2019-10-16 DIAGNOSIS — O34.32 CERVICAL INSUFFICIENCY DURING PREGNANCY IN SECOND TRIMESTER, ANTEPARTUM: Primary | ICD-10-CM

## 2019-10-16 DIAGNOSIS — O34.32 CERVICAL INSUFFICIENCY DURING PREGNANCY IN SECOND TRIMESTER, ANTEPARTUM: ICD-10-CM

## 2019-10-16 DIAGNOSIS — O26.879 SHORT CERVIX AFFECTING PREGNANCY: ICD-10-CM

## 2019-10-16 PROCEDURE — 76816 OB US FOLLOW-UP PER FETUS: CPT

## 2019-10-28 ENCOUNTER — COMMUNICATION - HEALTHEAST (OUTPATIENT)
Dept: FAMILY MEDICINE | Facility: CLINIC | Age: 34
End: 2019-10-28

## 2019-11-05 ENCOUNTER — RECORDS - HEALTHEAST (OUTPATIENT)
Dept: LAB | Facility: CLINIC | Age: 34
End: 2019-11-05

## 2019-11-05 ENCOUNTER — RECORDS - HEALTHEAST (OUTPATIENT)
Dept: ADMINISTRATIVE | Facility: OTHER | Age: 34
End: 2019-11-05

## 2019-11-05 ENCOUNTER — TRANSFERRED RECORDS (OUTPATIENT)
Dept: HEALTH INFORMATION MANAGEMENT | Facility: CLINIC | Age: 34
End: 2019-11-05

## 2019-11-05 LAB
ALT SERPL W P-5'-P-CCNC: 19 U/L (ref 0–45)
ALT SERPL-CCNC: 19 U/L (ref 0–45)
AST SERPL W P-5'-P-CCNC: 16 U/L (ref 0–40)
AST SERPL-CCNC: 16 U/L (ref 0–40)
BASOPHILS # BLD AUTO: 0 THOU/UL (ref 0–0.2)
BASOPHILS NFR BLD AUTO: 0 % (ref 0–2)
CREAT SERPL-MCNC: 0.68 MG/DL (ref 0.6–1.1)
CREAT SERPL-MCNC: 0.68 MG/DL (ref 0.6–1.1)
CREAT UR-MCNC: 33.2 MG/DL
EOSINOPHIL # BLD AUTO: 0.1 THOU/UL (ref 0–0.4)
EOSINOPHIL NFR BLD AUTO: 1 % (ref 0–6)
ERYTHROCYTE [DISTWIDTH] IN BLOOD BY AUTOMATED COUNT: 14.3 % (ref 11–14.5)
GFR SERPL CREATININE-BSD FRML MDRD: >60 ML/MIN/1.73M2
GFR SERPL CREATININE-BSD FRML MDRD: >60 ML/MIN/1.73M2
HCT VFR BLD AUTO: 40.3 % (ref 35–47)
HGB BLD-MCNC: 13.1 G/DL (ref 12–16)
LYMPHOCYTES # BLD AUTO: 2.1 THOU/UL (ref 0.8–4.4)
LYMPHOCYTES NFR BLD AUTO: 21 % (ref 20–40)
MCH RBC QN AUTO: 30.6 PG (ref 27–34)
MCHC RBC AUTO-ENTMCNC: 32.5 G/DL (ref 32–36)
MCV RBC AUTO: 94 FL (ref 80–100)
MONOCYTES # BLD AUTO: 0.8 THOU/UL (ref 0–0.9)
MONOCYTES NFR BLD AUTO: 8 % (ref 2–10)
NEUTROPHILS # BLD AUTO: 6.7 THOU/UL (ref 2–7.7)
NEUTROPHILS NFR BLD AUTO: 68 % (ref 50–70)
PLATELET # BLD AUTO: 301 THOU/UL (ref 140–440)
PMV BLD AUTO: 9.5 FL (ref 8.5–12.5)
PROTEIN, RANDOM URINE - HISTORICAL: <7 MG/DL
PROTEIN/CREAT RATIO, RANDOM UR: NORMAL
RBC # BLD AUTO: 4.28 MILL/UL (ref 3.8–5.4)
URATE SERPL-MCNC: 3 MG/DL (ref 2–7.5)
URATE SERPL-MCNC: 3 MG/DL (ref 2–7.5)
WBC: 9.8 THOU/UL (ref 4–11)

## 2019-11-12 ENCOUNTER — TRANSFERRED RECORDS (OUTPATIENT)
Dept: HEALTH INFORMATION MANAGEMENT | Facility: CLINIC | Age: 34
End: 2019-11-12

## 2019-11-13 ENCOUNTER — OFFICE VISIT (OUTPATIENT)
Dept: MATERNAL FETAL MEDICINE | Facility: CLINIC | Age: 34
End: 2019-11-13
Attending: OBSTETRICS & GYNECOLOGY
Payer: COMMERCIAL

## 2019-11-13 ENCOUNTER — APPOINTMENT (OUTPATIENT)
Dept: OBGYN | Facility: CLINIC | Age: 34
End: 2019-11-13
Payer: COMMERCIAL

## 2019-11-13 ENCOUNTER — OFFICE VISIT (OUTPATIENT)
Dept: OBGYN | Facility: CLINIC | Age: 34
End: 2019-11-13
Payer: COMMERCIAL

## 2019-11-13 ENCOUNTER — HOSPITAL ENCOUNTER (OUTPATIENT)
Dept: ULTRASOUND IMAGING | Facility: CLINIC | Age: 34
Discharge: HOME | End: 2019-11-13
Attending: OBSTETRICS & GYNECOLOGY | Admitting: OBSTETRICS & GYNECOLOGY
Payer: COMMERCIAL

## 2019-11-13 VITALS
HEART RATE: 88 BPM | DIASTOLIC BLOOD PRESSURE: 82 MMHG | BODY MASS INDEX: 42.75 KG/M2 | SYSTOLIC BLOOD PRESSURE: 128 MMHG | HEIGHT: 67 IN | WEIGHT: 272.4 LBS

## 2019-11-13 DIAGNOSIS — O34.32 CERVICAL INSUFFICIENCY DURING PREGNANCY IN SECOND TRIMESTER, ANTEPARTUM: ICD-10-CM

## 2019-11-13 DIAGNOSIS — O09.93 HIGH-RISK PREGNANCY IN THIRD TRIMESTER: Primary | ICD-10-CM

## 2019-11-13 DIAGNOSIS — O24.419 GESTATIONAL DIABETES MELLITUS (GDM) IN THIRD TRIMESTER, GESTATIONAL DIABETES METHOD OF CONTROL UNSPECIFIED: Primary | ICD-10-CM

## 2019-11-13 DIAGNOSIS — O24.414 INSULIN CONTROLLED GESTATIONAL DIABETES MELLITUS (GDM) IN THIRD TRIMESTER: ICD-10-CM

## 2019-11-13 PROCEDURE — 76816 OB US FOLLOW-UP PER FETUS: CPT

## 2019-11-13 RX ORDER — BLOOD-GLUCOSE METER
EACH MISCELLANEOUS
Qty: 1 KIT | Refills: 0 | Status: SHIPPED | OUTPATIENT
Start: 2019-11-13 | End: 2022-09-22

## 2019-11-13 ASSESSMENT — MIFFLIN-ST. JEOR: SCORE: 1978.23

## 2019-11-13 NOTE — PROGRESS NOTES
"Please see \"Imaging\" tab under \"Chart Review\" for details of today's US at the [facility].    Aleksey Davila MD  Maternal-Fetal Medicine      "

## 2019-11-13 NOTE — NURSING NOTE
Welcomed Alejandra to clinic today. Provided with book. Reviewed clinic phone numbers/contacts.     Reviewed diagnosis of GDM and referral process to Diabetic Educator for instruction followed by visit with Cathryn Barrett one week following this. Provided patient with BS log. Ordered BS testing supplies to pharmacy.

## 2019-11-13 NOTE — PROGRESS NOTES
"[facility]  Transfer of Care Obstetrics Visit    HPI: 34 year old  at 29w6d by ETD here today for transfer of care OB visit. She is feeling well.  Reports no cramping, bleeding, leaking of fluid. +FM.  Would like to start swimming again as she stopped any exercise after her cerclage placement.    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 Patient is pregnant.  - Pap Smears: NILM, HPV negative 2019    PMHx: Necrotizing enterocolitis, pre-diabetes  PSHx: Bowel resection, surgeries as child for necrotizing enterocolitis, T&A, wrist surgery, laparoscopy for lysis of adhesions, failed attempted salpingectomy  Meds: PNV, vitamin D, ASA  Allergies:  NKDA    SocHx: Denies tobacco, alcohol or drug use.    ROS: 10-Point ROS negative except as noted in HPI    Physical Exam  /82   Pulse 88   Ht 1.702 m (5' 7\")   Wt 123.6 kg (272 lb 6.4 oz)   BMI 42.66 kg/m    Gen: Well-appearing, NAD  HEENT: Normocephalic, atraumatic  CV:  Regular rate  Pulm: Unlabored breathing  Abd: Soft, non-tender, gravid      --Ideal BMI: 18.5-24.9  Current BMI: Body mass index is 42.66 kg/m .  --Underweight = <18.5  --Normal weight = 18.5-24.9  --Overweight = 25-29.9  --Obesity = >30       Labs:  Awaiting fax from prior clinic to review prenatal labs    Assessment/Plan:  Ms. Alejandra Moss is a 34 year old  at 29w5d by ETD, here for new OB visit. Pregnancy is complicated by cervical " insufficiency, gestational diabetes, obesity, extensive intra-abdominal adhesions, and IVF pregnancy.    Cervical insufficiency  - janiskar cerclage in place, scheduled removal on 12/26 with Dr. Bermudez at 36 weeks gestation  - cervix 13.9 mm on transabdominal view 10/16 with funneling present extending to stitch but not through stitch    PNC  - Rh pos, Fernanda neg, hgb 15.0, plt 301, nl hgb eletrophoresis  - HIV NR, Hep B S Antigen NR, rubella immune, varicella immune, TSH 0.76, hgbA1c 5.8%, RPR NR, gc/chlam neg  - pap NILM, HPV neg (1/2019)  -  ALT 19 AST 16 (11/5), creatinine 0.68, UPC too low to calculate  - UCx >10K mixed genital td, nl AFP  - s/p flu and TDAp per patient  - US and growth today    GDMA1  - s/p failed 3 hr GTT yesterday. Referral to diabetes education provided to start monitoring blood glucoses  - discussed healthy diet and exercise, plans to resume light swimming. Discussed avoidance of weightbearing, lunges, running or other intense cardio given cervical insufficiency    Follow up in 2 weeks.    Amy Schumer, MD  Obstetrics and Gynecology PGY-3  11/13/19    The Patient was seen in [facility]uity [facility] by SCHUMER, AMY.  I reviewed the history & exam. Assessment and plan were jointly made.    Angie Carvalho MD

## 2019-11-14 PROBLEM — Z36.89 ENCOUNTER FOR TRIAGE IN PREGNANT PATIENT: Status: RESOLVED | Noted: 2019-09-04 | Resolved: 2019-11-14

## 2019-11-15 ENCOUNTER — TELEPHONE (OUTPATIENT)
Dept: PHARMACY | Facility: CLINIC | Age: 34
End: 2019-11-15

## 2019-11-15 ENCOUNTER — ALLIED HEALTH/NURSE VISIT (OUTPATIENT)
Dept: EDUCATION SERVICES | Facility: CLINIC | Age: 34
End: 2019-11-15
Payer: COMMERCIAL

## 2019-11-15 VITALS — BODY MASS INDEX: 42.27 KG/M2 | WEIGHT: 269.9 LBS

## 2019-11-15 DIAGNOSIS — O24.414 INSULIN CONTROLLED GESTATIONAL DIABETES MELLITUS (GDM) IN THIRD TRIMESTER: ICD-10-CM

## 2019-11-15 NOTE — PATIENT INSTRUCTIONS
Start checking your glucose 4 times per day.   Fasting and one hour after each meal.  Glucose goals:  Fasting <95  1 hour after eating <140  2 hours after eating <120     Walk/swim as able per MD recommendation.     We talked about healthy eating during pregnancy today and for glucose management. You came up with some diet modifications which include:  Changing your breakfast options  Eliminating juice  Cutting back a little on milk (you need three high calcium foods per day)  And watching carbohydrates in general    It is great that you are eating three meals and three snacks daily.     30-45 grams at breakfast  45-60 grams at lunch and dinner  15-30 grams at snacks    It can be helpful to keep food records for the first week.

## 2019-11-15 NOTE — PROGRESS NOTES
Diabetes Self-Management Education & Support  Gestational Diabetes Self-Management Education & Support    SUBJECTIVE/OBJECTIVE:  Presents for education related to gestational diabetes.    Accompanied by: Self    Cultural Influences/Ethnic Background:  American    Estimated Date of Delivery: Jan 23, 2020    1 hour OGTT  No results found for: GLU1    3 hour OGTT    Fasting  No results found for: GLF    1 hour  No results found for: GL1    2 hour  No results found for: GL2    3 hour  No results found for: GL3    Lifestyle and Health Behaviors:  Exercise:: (Has been cleared by MD to start swimming and is going to reach out to her surgen to double check. Otherwise has been walking. )  Barrier to exercise: Safety, Physical limitation    Healthy Coping:  Emotional response to diabetes: Ready to learn    Current Management:  Diet    ASSESSMENT:  Patient with newly diagnosed gestational diabetes here today for education on diet and SMBG.  Patient was quick to learn today and demonstrate understanding. She easily identified changes to make in her diet for glucose management and good nutrition.     INTERVENTION:  Patient was instructed on Accu-Chek Tiffanie meter and was able to provide an accurate return demonstration. Patient's blood glucose reading today was 167 mg/dL after two pieces of toast, orange juice and an apple    Educational topics covered today:  GDM diagnosis, pathophysiology, Risks and Complications of GDM, Means of controlling GDM, Using a Blood Glucose Monitor, Blood Glucose Goals, Logging and Interpreting Glucose Results, When to Call a Diabetes Educator or OB Provider, Healthy Eating During Pregnancy, Counting Carbohydrates, Meal Planning for GDM, and Physical Activity, six week postpartum OGTT follow up    Educational materials provided today:   Ned Understanding Gestational Diabetes    Pt verbalized understanding of concepts discussed and recommendations provided today.     PLAN:  See AVS  Patient will  be following up with Cathryn Barrett PharmD in one week  My contact info was provided to the patient   Time Spent: 60 minutes  Encounter Type: Individual    Any diabetes medication dose changes were made via the CDE Protocol and Collaborative Practice Agreement with the patient's referring provider. A copy of this encounter was shared with the provider.

## 2019-11-21 ENCOUNTER — TELEPHONE (OUTPATIENT)
Dept: OBGYN | Facility: CLINIC | Age: 34
End: 2019-11-21

## 2019-11-21 NOTE — TELEPHONE ENCOUNTER
After Visit Summary   2018    Shemar Almanza    MRN: [id number]           Patient Information     Date Of Birth          2016        Visit Information        Provider Department      2018 11:20 AM Bernadette Bui MD [facility] P[facility] General        Today's Diagnoses     Intermittent esotropia, alternating    -  1    Brown's syndrome, left eye          Care Instructions    Here is a list of optical shops we recommend for your child's glasses:    [facility] (cont d)  [facility]    [facility]  [address]    [address] [place] [address]    [place] [address]   [phone]                       [facility]    [facility]  [address]    [address] [place]  [address]    [place] [address]  [phone]        [facility]    [facility]                    [facility]    [address]  [address] [place]  [address]  [facility] [address]    [phone]  Phone: [phone]  Fax: [phone]     [facility]  Hours: M-Th 8a-7p     [address] [place] [address]  Fri 8a-5p      [place]  [address]         [phone]  [facility]     [facility]  [facility] [address]     [address]  Phone: [phone]    [place]  [address]  Fax: [phone]  Hours: M-Th 8a-7p  Fri 8a-5p      [facility] ([place])      [facility]   [place]    [address] [place], [place] [address] [place]  [address]   [address] [facility]    [phone]   [place], [place]  [address] [phone]  M-F 8:30-5     [facility] (3):      (they do NOT accept   [facility]   vision insurance)   [address]    [facility] [place]  [address] [address]  [phone]  Patient calling today with concern for constipation in pregnancy. We reviewed good hydration. Discussed addition of Metamucil and use of Miralax daily for acute relief. Discussed duration of onset. Advised to call if she experiences abdominal pain. All questions answered.    M-Th 8:30-5:30, F 8:30-5  [place]  [address]      [phone]  [facility]     and     [address]    [address]     [place]  [address]    [place]  [address]  [phone] M-Th 8:30-5:30, F 8:30-5   [phone]       and    [facility]  [address]  [address]    [place]  [address]  [place]  [address]     [phone] M-F 8:30-5      [facility]  [facility]   [address]   [address]    [place] [address]  [place]  [address]    [phone]  M-F 8:30-5            [facility]      [address] [place] [address]         [phone]            [facility]   [facility]    [facility]    [address]  [address] [facility].    [place] [address]  [place]  [address]    Phone: [phone]     Hours: M,W,Th,Fr 8:30-5:30          Tu    9:30-6  [facility]   [facility]  [address]    [facility] [address]    [address] [place] [address]  Phone: [phone]    [place]  [address]  Hours: M-F 8:30-5    [phone]     [facility]  [address]  [place] [address]  Phone: [phone]  Hours: M-T 8:30 - 5:30              Fr     8:30 - 5      [place]  [facility]  [address]  [place] [address]  Phone: [phone]          Follow-ups after your visit        Follow-up notes from your care team     Return in about 2 months (around 1/8/2019).      Your next 10 appointments already scheduled     Jan 11, 2019  8:00 AM CST   ORTHOPTICS with [facility] EYE ORTHOPTICS   [facility] ([facility])    [address]  [address] [id number] [phone]              Who to contact     Please  call your clinic at [phone] to:    Ask questions about your health    Make or cancel appointments    Discuss your medicines    Learn about your test results    Speak to your doctor            Additional Information About Your Visit        MyChart Information     Everpl[employer] is an electronic gateway that provides easy, online access to your medical records. With Ever[employer], you can request a clinic appointment, read your test results, renew a prescription or communicate with your care team.     To sign up for [employer], please contact your [facility] or call [phone] for assistance.           Care EveryWhere ID     This is your Care EveryWhere ID. This could be used by other organizations to access your [place] medical records  [id number]         Blood Pressure from Last 3 Encounters:   04/10/18 105/63   02/05/18 98/59    Weight from Last 3 Encounters:   09/25/18 9.752 kg (21 lb 8 oz) (82 %)*   09/12/18 9.653 kg (21 lb 4.5 oz) (83 %)*   07/12/18 8.193 kg (18 lb 1 oz) (57 %)*     * Growth percentiles are based on WHO (Boys, 0-2 years) data.              We Performed the Following     Sensorimotor        Primary Care Provider Office Phone # Fax #    Brinda Lacey [phone] [address] [address]        Equal Access to Services     KOFFI MELCHOR : Hadii oscar osborn hadasho Sochavez, waaxda luqadaha, qaybta kaalmada catrachito, lui brock. So [facility] [phone].    ATENCIÓN: Si habla español, tiene a vasquez disposición servicios gratuitos de asistencia lingüística. Llame al [phone].    We comply with applicable federal civil rights laws and [place] laws. We do not discriminate on the basis of race, color, national origin, age, disability, sex, sexual orientation, or gender identity.            Thank you!     Thank you for choosing [facility] EYE GENERAL  for your care. Our goal is always to provide you with excellent  care. Hearing back from our patients is one way we can continue to improve our services. Please take a few minutes to complete the written survey that you may receive in the mail after your visit with us. Thank you!             Your Updated Medication List - Protect others around you: Learn how to safely use, store and throw away your medicines at www.disposemymeds.org.          This list is accurate as of 11/8/18 11:58 AM.  Always use your most recent med list.                   Brand Name Dispense Instructions for use Diagnosis    acetaminophen 32 mg/mL solution    TYLENOL    1.25 mL    Give orally 1.25ml once in clinic after circumcision.    Encounter for routine or ritual circumcision       LANsoprazole 3 mg/mL Susp    PREVACID    90 mL    Take 3 mLs (9 mg) by mouth every morning (before breakfast)    Gastroesophageal reflux disease, esophagitis presence not specified       * omeprazole 2 mg/mL Susp    priLOSEC     Take by mouth every morning (before breakfast)        * omeprazole 2 mg/mL Susp    priLOSEC    150 mL    Take 5 mLs (10 mg) by mouth daily    Gastroesophageal reflux disease, esophagitis presence not specified       pediatric multivitamin with iron solution     50 mL    Take 0.5 mLs by mouth daily    Prematurity       * Notice:  This list has 2 medication(s) that are the same as other medications prescribed for you. Read the directions carefully, and ask your doctor or other care provider to review them with you.

## 2019-11-26 ENCOUNTER — OFFICE VISIT (OUTPATIENT)
Dept: PHARMACY | Facility: CLINIC | Age: 34
End: 2019-11-26
Payer: COMMERCIAL

## 2019-11-26 VITALS — DIASTOLIC BLOOD PRESSURE: 82 MMHG | SYSTOLIC BLOOD PRESSURE: 138 MMHG | HEART RATE: 93 BPM

## 2019-11-26 DIAGNOSIS — O24.414 INSULIN CONTROLLED GESTATIONAL DIABETES MELLITUS (GDM) IN THIRD TRIMESTER: Primary | ICD-10-CM

## 2019-11-26 DIAGNOSIS — O09.293: ICD-10-CM

## 2019-11-26 PROCEDURE — 99605 MTMS BY PHARM NP 15 MIN: CPT | Performed by: PHARMACIST

## 2019-11-26 PROCEDURE — 99607 MTMS BY PHARM ADDL 15 MIN: CPT | Performed by: PHARMACIST

## 2019-11-26 RX ORDER — INSULIN GLARGINE 100 [IU]/ML
10 INJECTION, SOLUTION SUBCUTANEOUS DAILY
Qty: 15 ML | Refills: 3 | Status: SHIPPED | OUTPATIENT
Start: 2019-11-26 | End: 2019-12-03

## 2019-11-26 NOTE — PROGRESS NOTES
SUBJECTIVE/OBJECTIVE:                Alejandra Moss is a 34 year old female seen in clinic for a follow-up visit for Medication Therapy Management.  She was referred to me from Dr. Schumer.     Chief Complaint: Initial visit for GDM. Review of glucose log and discussion regarding insulin use.   Personal Healthcare Goals: healthy pregnancy     Medication Adherence/Access:  Adherent to blood glucose measurements and supplements- no issues identified.     GDM: Alejandra Moss is a 34 year old at 31w5d for follow-up of gestational diabetes for glucose log review.   Current therapy: lifestyle modifications (recent changes include: cutting out orange juice, decreasing whole milk intake, and limiting sweets and fast food)    She is checking her blood sugars regularly, including 2 hour post-prandial measurements.    Blood sugars are as follows: (complete table or insert clinical media)    Date Fasting Post-Breakfast Post-Lunch Post-Dinner   11/15  167 152 143   11/16 140 174 150 178   11/17 119 140 122 109   11/18 113 140 134 108   11/19 134 139 145 119   11/20 127 173 94 110   11/21 111 103 117 116   11/22 112 151 116 105   11/23 126 201 142 140   11/24 110 140 135 120   11/25 108 150 124 171   11/26 120 150         Pregnancy: She is taking PNV and vitamin D3. She is also on ASA for preclampsia prevention.       Today's Vitals:  /82   Pulse 93       ASSESSMENT:              Current medications were reviewed today as discussed above.      Medication Adherence: excellent, no issues identified    GDM: Alejandra Moss is a 34 year old at 31w5d for follow-up of gestational diabetes for glycemic log review.  After reviewing her blood sugars, less than 50% are at target. Based on this, I recommend Initiation of insulin therapy. Since none of the fasting readings are at goal basal insulin would be beneficial. Future titration will likely be required to attain goal readings.     Pregnancy: appropriate use of supplements in  pregnancy, no concerns     PLAN:                  Continue dietary modifications, regular exercise as able  Continue to check blood glucose 4x daily  Bring glucose log to all appointments  Needs 2-hr GTT at 6 wks post-partum  Start insulin today Glargine 10 units once daily at bedtime.  Educated on insulin injection today.    I spent 30 minutes with this patient today. All changes were made via collaborative practice agreement with Dr. Carvalho . A copy of the visit note was provided to the patient's referring provider.     Will follow up in one week with Dr. Rodriguez or Nena for glycemic log review.    The patient was given a summary of these recommendations as an after visit summary.    Thank you,   Minal Paulino 4th Year Pharmacy Student on 11/26/2019 at 10:00 PM    I was present with the student during the assessment and I approve of the above plan and documentation.      Cathryn Barrett, Pharm.D., BCPS

## 2019-11-26 NOTE — PATIENT INSTRUCTIONS
Recommendations from today's MTM visit:                                                    MTM (medication therapy management) is a service provided by a clinical pharmacist designed to help you get the most of out of your medicines.        Continue dietary modifications, regular exercise as able  Continue to check blood glucose 4x daily  Bring glucose log to all appointments  Needs 2-hr GTT at 6 wks post-partum  Start insulin today Glargine 10 units once daily at bedtime    It was great to speak with you today.  I value your experience and would be very thankful for your time with providing feedback on our clinic survey. You may receive a survey via email or text message in the next few days.     Next MTM visit: 1 week with either Dr. Rodriguez or Dr. Barrett    To schedule another MTM appointment, please call the clinic directly or you may call the MTM scheduling line at [phone] or toll-free at [phone].     My Clinical Pharmacist's contact information:                                                      It was a pleasure talking with you today!  Please feel free to contact me with any questions or concerns you have.      Cathryn Barrett, Pharm.D., [facility]  Phone:  [phone]

## 2019-11-27 ENCOUNTER — OFFICE VISIT (OUTPATIENT)
Dept: OBGYN | Facility: CLINIC | Age: 34
End: 2019-11-27
Payer: COMMERCIAL

## 2019-11-27 VITALS
BODY MASS INDEX: 41.77 KG/M2 | SYSTOLIC BLOOD PRESSURE: 131 MMHG | HEART RATE: 91 BPM | WEIGHT: 266.7 LBS | DIASTOLIC BLOOD PRESSURE: 87 MMHG

## 2019-11-27 DIAGNOSIS — O24.414 INSULIN CONTROLLED GESTATIONAL DIABETES MELLITUS (GDM) IN THIRD TRIMESTER: Primary | ICD-10-CM

## 2019-11-27 PROCEDURE — G0463 HOSPITAL OUTPT CLINIC VISIT: HCPCS | Mod: ZF

## 2019-11-27 RX ORDER — ASPIRIN 81 MG/1
81 TABLET ORAL DAILY
COMMUNITY
End: 2022-09-22

## 2019-11-27 ASSESSMENT — PAIN SCALES - GENERAL: PAINLEVEL: NO PAIN (0)

## 2019-11-27 NOTE — PROGRESS NOTES
[facility]  Return Obstetrics Visit     HPI:      34 year old  at 31w6d by ETD here today for return OB visit with her  and step-daughter. She is feeling well.  Reports no cramping, bleeding, leaking of fluid. +FM.  Denies headache, vision changes, SOB, chest pain, RUQ pain, worsening LE swelling. She has questions about her exercise regimen and further surveillance for baby with new dx of gestational diabetes.     Pregnancy is complicated by:  - cervical insufficiency, rescue shirodkar cerclage in place, placed at 19w6d  - IVF pregnancy  - history of abdominal surgery as child for necrotizing enterocolitis, hx bowel resection, significant intra-abdominal adhesions  - pre-diabetes, A1c 5.8% at new OB, now with GDM, started insulin Glargine 10U at bedtime on   - obesity  - klebsiella UTI x1    GYN History  - LMP: No LMP recorded. Patient is pregnant.  - Pap Smears:   NILM, HPV negative 2019     PMHx:   Necrotizing enterocolitis, pre-diabetes  PSHx:   Bowel resection, surgeries as child for necrotizing enterocolitis, T&A, wrist surgery, laparoscopy for lysis of adhesions, failed attempted salpingectomy  Meds:    PNV, vitamin D, ASA  Allergies:  NKDA     SocHx:  Denies tobacco, alcohol or drug use.     ROS:     10-Point ROS negative except as noted in HPI     Physical Exam    Vital signs:  BP: 131/87 Pulse: 91 Weight: 121 kg (266 lb 11.2 oz); BMI: 41.77 kg/m      Gen:      Well-appearing, NAD  HEENT: Normocephalic, atraumatic  CV:       Regular rate  Pulm:    Unlabored breathing  Abd:      Soft, non-tender, gravid, extensive scars on upper abdomen    FHR: 142 bpm  Fundal height: 33cm     Assessment/Plan:  Ms. Alejandra Moss is a 34 year old  at 31w6d by ETD, here for return OB visit. Pregnancy is complicated by cervical insufficiency, gestational diabetes, obesity, extensive intra-abdominal adhesions, and IVF pregnancy.     Cervical insufficiency  - shirodkar cerclage in place, scheduled  removal on 12/26 with Dr. Bermudez at 36 weeks gestation  - cervix 13.9 mm on transabdominal view 10/16 with funneling present extending to stitch but not through stitch     PNC  - Rh pos, Fernanda neg, hgb 15.0, plt 301, nl hgb eletrophoresis  - HIV NR, Hep B S Antigen NR, rubella immune, varicella immune, TSH 0.76, hgbA1c 5.8%, RPR NR, gc/chlam neg  - pap NILM, HPV neg (1/2019)  -  ALT 19 AST 16 (11/5), creatinine 0.68, UPC too low to calculate  - UCx >10K mixed genital td, nl AFP  - s/p flu and TDAP per patient     GDMA1  - s/p failed 3 hr GTT 11/12. Saw Cathryn Barrett yesterday to start insulin after all fastings, nearly all breakfasts and lunch above goal   - discussed healthy diet and exercise, plans to resume light swimming. Discussed avoidance of weightbearing, lunges, running or other intense cardio given cervical insufficiency  - taking ASA for preE prevention  - will start biweekly BPPs next week, q4 week growth scans    Follow up in 2 weeks.    Pema Cantu MD  OBGYN Resident, PGY1    The Patient was seen in Resident Continuity Clinic by PEMA CANTU.  Patient was seen by me with the resident.     Milagros Hill MD

## 2019-12-02 ENCOUNTER — TELEPHONE (OUTPATIENT)
Dept: OBGYN | Facility: CLINIC | Age: 34
End: 2019-12-02

## 2019-12-02 ENCOUNTER — ANCILLARY PROCEDURE (OUTPATIENT)
Dept: ULTRASOUND IMAGING | Facility: CLINIC | Age: 34
End: 2019-12-02
Attending: OBSTETRICS & GYNECOLOGY
Payer: COMMERCIAL

## 2019-12-02 DIAGNOSIS — O24.414 INSULIN CONTROLLED GESTATIONAL DIABETES MELLITUS (GDM) IN THIRD TRIMESTER: ICD-10-CM

## 2019-12-02 PROCEDURE — 76819 FETAL BIOPHYS PROFIL W/O NST: CPT

## 2019-12-02 NOTE — TELEPHONE ENCOUNTER
Spoke with Alejandra and informed her of need for follow-up appointment with Dr. Rodriguez, internal medicine, regarding review of blood sugars after starting insulin.  She agreed with plan and was scheduled for tomorrow.

## 2019-12-03 ENCOUNTER — OFFICE VISIT (OUTPATIENT)
Dept: INTERNAL MEDICINE | Facility: CLINIC | Age: 34
End: 2019-12-03
Attending: INTERNAL MEDICINE
Payer: COMMERCIAL

## 2019-12-03 ENCOUNTER — RECORDS - HEALTHEAST (OUTPATIENT)
Dept: ADMINISTRATIVE | Facility: OTHER | Age: 34
End: 2019-12-03

## 2019-12-03 VITALS
DIASTOLIC BLOOD PRESSURE: 79 MMHG | HEART RATE: 98 BPM | WEIGHT: 271 LBS | SYSTOLIC BLOOD PRESSURE: 117 MMHG | BODY MASS INDEX: 42.44 KG/M2

## 2019-12-03 DIAGNOSIS — O24.414 INSULIN CONTROLLED GESTATIONAL DIABETES MELLITUS (GDM) IN THIRD TRIMESTER: ICD-10-CM

## 2019-12-03 PROCEDURE — G0463 HOSPITAL OUTPT CLINIC VISIT: HCPCS | Mod: ZF

## 2019-12-03 RX ORDER — INSULIN GLARGINE 100 [IU]/ML
15 INJECTION, SOLUTION SUBCUTANEOUS DAILY
Qty: 15 ML | Refills: 3 | Status: SHIPPED | OUTPATIENT
Start: 2019-12-03 | End: 2022-09-22

## 2019-12-03 NOTE — PROGRESS NOTES
[facility] - [facility]    HPI:  Alejandra Moss is a 34 year old  at 32w5d with a new diagnosis of gestational diabetes.     She has seen diabetes education.   She does not have a history of gestational diabetes.   She does not have a history of Type 2 Diabetes.   She does  have a family history of Type 2 Diabetes.   She does not have a history of pre-term labor.   She does not have a history of chronic hypertension, gestational hypertension or pre-eclampsia.   She does not have a history of LGA (infant >10 lbs).     She is checking her blood sugars regularly, including Fasting and 1-hour post-prandial.    Blood sugars are as follows: (complete table or insert clinical media)    Date Fasting Post-Breakfast Post-Lunch Post-Dinner   12/3 110  132     110 128 127 111    115 146 158 139    117 144 122 160    112 143 128 138    90 113 148 133    113 167 119 133     Diabetes Symptoms:   none      Past Medical History:   Diagnosis Date     Infertility of tubal origin     presumed due to intraabdominal adhesions     Obesity      Tonsillitis      Past Surgical History:   Procedure Laterality Date     CERCLAGE CERVICAL N/A 2019    Procedure: CERCLAGE, CERVIX, VAGINAL APPROACH;  Surgeon: Cathryn Bermudez MD;  Location:  [facility]     LAPAROTOMY EXPLORATORY       SMALL BOWEL RESECTION       TONSILLECTOMY, ADENOIDECTOMY ADULT, COMBINED  2013    Procedure: COMBINED TONSILLECTOMY, ADENOIDECTOMY ADULT;  TONSILLECTOMY ;  Surgeon: Prateek Sifuentes MD;  Location: [facility] SURGERY       Family History   Problem Relation Age of Onset     Diabetes Mother      Heart Disease Father      Diabetes Father      Heart Disease Maternal Aunt      Obesity Maternal Aunt      Pancreatic Cancer Maternal Grandmother      Heart Disease Maternal Grandfather      Cancer Maternal Grandfather      Social History     Socioeconomic History     Marital status:      Spouse name: Not on file     Number  of children: Not on file     Years of education: Not on file     Highest education level: Not on file   Occupational History     Not on file   Social Needs     Financial resource strain: Not on file     Food insecurity:     Worry: Not on file     Inability: Not on file     Transportation needs:     Medical: Not on file     Non-medical: Not on file   Tobacco Use     Smoking status: Never Smoker     Smokeless tobacco: Never Used   Substance and Sexual Activity     Alcohol use: Not Currently     Comment: 1-2x/week     Drug use: Never     Sexual activity: Yes     Partners: Male   Lifestyle     Physical activity:     Days per week: Not on file     Minutes per session: Not on file     Stress: Not on file   Relationships     Social connections:     Talks on phone: Not on file     Gets together: Not on file     Attends [employer] service: Not on file     Active member of club or organization: Not on file     Attends meetings of clubs or organizations: Not on file     Relationship status: Not on file     Intimate partner violence:     Fear of current or ex partner: Not on file     Emotionally abused: Not on file     Physically abused: Not on file     Forced sexual activity: Not on file   Other Topics Concern     Not on file   Social History Narrative    ** Merged History Encounter **            10 point ROS of systems including Constitutional, Eyes, Respiratory, Cardiovascular, Gastroenterology, Genitourinary, Integumentary, Muscularskeletal, Psychiatric were all negative except for pertinent positives noted in my HPI.    Physical Exam:   /79   Pulse 98   Wt 122.9 kg (271 lb)   Breastfeeding No   BMI 42.44 kg/m    Gen: Pleasant female, in NAD  Eyes: Anicteric sclera, EOMI  ENT: Oropharynx clear, MMM  Neck: no LAD  Resp: lungs CAB  CV: Heart RRR, no MRG  Abd: Gravid abdomen, non-tender, nl bowel sounds  Ext: WWP, no LE edema  Skin: Warm, dry, no rash  Neuro: AOx3, no focal deficits      Assessment:     Alejandra Moss  is a 34 year old  at 32w5d with a new diagnosis of gestational diabetes. She has the following risk factors that indicate early need for insulin therapy, including: Fasting Blood Glucose >95 and Family History of T2DM. This is not 6 or 7 risk factors. After reviewing her blood sugars, less than 50% are at target. Based on this, I recommend Adjustment of insulin therapy, Continuation of current medical therapy.     Plan:   Continue dietary modifications, regular exercise as able  Continue to check blood glucose 4x daily  Bring glucose log to all appointments  Needs 2-hr GTT at 6 wks post-partum  Increase long-acting insulin by 5 units  Follow-up with Dr. Barrett in 1 week(s)  Follow-up with Dr. Norman Mckenzie or Dr. Rodriguez in 2 week(s)    Rochelle Rodriguez MD

## 2019-12-03 NOTE — LETTER
12/3/2019       RE: Alejandra Moss  [address]  [place] [address]     Dear Colleague,    Thank you for referring your patient, Alejandra Moss, to the [facility]  at [facility]. Please see a copy of my visit note below.  [facility] - [facility]    HPI:  Alejandra Moss is a 34 year old  at 32w5d with a new diagnosis of gestational diabetes.     She has seen diabetes education.   She does not have a history of gestational diabetes.   She does not have a history of Type 2 Diabetes.   She does  have a family history of Type 2 Diabetes.   She does not have a history of pre-term labor.   She does not have a history of chronic hypertension, gestational hypertension or pre-eclampsia.   She does not have a history of LGA (infant >10 lbs).     She is checking her blood sugars regularly, including Fasting and 1-hour post-prandial.    Blood sugars are as follows: (complete table or insert clinical media)    Date Fasting Post-Breakfast Post-Lunch Post-Dinner   12/3 110  132     110 128 127 111    115 146 158 139    117 144 122 160    112 143 128 138    90 113 148 133    113 167 119 133     Diabetes Symptoms:   none    Past Medical History:   Diagnosis Date     Infertility of tubal origin     presumed due to intraabdominal adhesions     Obesity      Tonsillitis      Past Surgical History:   Procedure Laterality Date     CERCLAGE CERVICAL N/A 2019    Procedure: CERCLAGE, CERVIX, VAGINAL APPROACH;  Surgeon: Cathryn Bermudez MD;  Location:  [facility]     LAPAROTOMY EXPLORATORY       SMALL BOWEL RESECTION       TONSILLECTOMY, ADENOIDECTOMY ADULT, COMBINED  2013    Procedure: COMBINED TONSILLECTOMY, ADENOIDECTOMY ADULT;  TONSILLECTOMY ;  Surgeon: Prateek Sifuentes MD;  Location: [facility] SURGERY       Family History   Problem Relation Age of Onset     Diabetes Mother      Heart Disease Father      Diabetes Father      Heart  Disease Maternal Aunt      Obesity Maternal Aunt      Pancreatic Cancer Maternal Grandmother      Heart Disease Maternal Grandfather      Cancer Maternal Grandfather      Social History     Socioeconomic History     Marital status:      Spouse name: Not on file     Number of children: Not on file     Years of education: Not on file     Highest education level: Not on file   Occupational History     Not on file   Social Needs     Financial resource strain: Not on file     Food insecurity:     Worry: Not on file     Inability: Not on file     Transportation needs:     Medical: Not on file     Non-medical: Not on file   Tobacco Use     Smoking status: Never Smoker     Smokeless tobacco: Never Used   Substance and Sexual Activity     Alcohol use: Not Currently     Comment: 1-2x/week     Drug use: Never     Sexual activity: Yes     Partners: Male   Lifestyle     Physical activity:     Days per week: Not on file     Minutes per session: Not on file     Stress: Not on file   Relationships     Social connections:     Talks on phone: Not on file     Gets together: Not on file     Attends Faith service: Not on file     Active member of club or organization: Not on file     Attends meetings of clubs or organizations: Not on file     Relationship status: Not on file     Intimate partner violence:     Fear of current or ex partner: Not on file     Emotionally abused: Not on file     Physically abused: Not on file     Forced sexual activity: Not on file   Other Topics Concern     Not on file   Social History Narrative    ** Merged History Encounter **          10 point ROS of systems including Constitutional, Eyes, Respiratory, Cardiovascular, Gastroenterology, Genitourinary, Integumentary, Muscularskeletal, Psychiatric were all negative except for pertinent positives noted in my HPI.    Physical Exam:   /79   Pulse 98   Wt 122.9 kg (271 lb)   Breastfeeding No   BMI 42.44 kg/m     Gen: Pleasant female, in  NAD  Eyes: Anicteric sclera, EOMI  ENT: Oropharynx clear, MMM  Neck: no LAD  Resp: lungs CAB  CV: Heart RRR, no MRG  Abd: Gravid abdomen, non-tender, nl bowel sounds  Ext: WWP, no LE edema  Skin: Warm, dry, no rash  Neuro: AOx3, no focal deficits      Assessment:   Alejandra Moss is a 34 year old  at 32w5d with a new diagnosis of gestational diabetes. She has the following risk factors that indicate early need for insulin therapy, including: Fasting Blood Glucose >95 and Family History of T2DM. This is not 6 or 7 risk factors. After reviewing her blood sugars, less than 50% are at target. Based on this, I recommend Adjustment of insulin therapy, Continuation of current medical therapy.     Plan:   Continue dietary modifications, regular exercise as able  Continue to check blood glucose 4x daily  Bring glucose log to all appointments  Needs 2-hr GTT at 6 wks post-partum  Increase long-acting insulin by 5 units  Follow-up with Dr. Barrett in 1 week(s)  Follow-up with Dr. Norman Mckenzie or Dr. Rodriguez in 2 week(s)    Rochelle Rodriguez MD

## 2019-12-05 ENCOUNTER — HOSPITAL ENCOUNTER (OUTPATIENT)
Facility: CLINIC | Age: 34
Discharge: HOME | End: 2019-12-05
Attending: OBSTETRICS & GYNECOLOGY | Admitting: OBSTETRICS & GYNECOLOGY
Payer: COMMERCIAL

## 2019-12-05 ENCOUNTER — TELEPHONE (OUTPATIENT)
Dept: OBGYN | Facility: CLINIC | Age: 34
End: 2019-12-05

## 2019-12-05 VITALS
HEIGHT: 67 IN | WEIGHT: 271 LBS | BODY MASS INDEX: 42.53 KG/M2 | SYSTOLIC BLOOD PRESSURE: 125 MMHG | TEMPERATURE: 97.9 F | DIASTOLIC BLOOD PRESSURE: 84 MMHG | RESPIRATION RATE: 16 BRPM

## 2019-12-05 PROBLEM — Z36.89 ENCOUNTER FOR TRIAGE IN PREGNANT PATIENT: Status: ACTIVE | Noted: 2019-12-05

## 2019-12-05 LAB
ALBUMIN UR-MCNC: NEGATIVE MG/DL
APPEARANCE UR: CLEAR
BACTERIA #/AREA URNS HPF: ABNORMAL /HPF
BILIRUB UR QL STRIP: NEGATIVE
COLOR UR AUTO: ABNORMAL
GLUCOSE UR STRIP-MCNC: NEGATIVE MG/DL
HGB UR QL STRIP: NEGATIVE
KETONES UR STRIP-MCNC: NEGATIVE MG/DL
LEUKOCYTE ESTERASE UR QL STRIP: NEGATIVE
MUCOUS THREADS #/AREA URNS LPF: PRESENT /LPF
NITRATE UR QL: NEGATIVE
PH UR STRIP: 6.5 PH (ref 5–7)
RBC #/AREA URNS AUTO: <1 /HPF (ref 0–2)
SOURCE: ABNORMAL
SP GR UR STRIP: 1.01 (ref 1–1.03)
SQUAMOUS #/AREA URNS AUTO: <1 /HPF (ref 0–1)
UROBILINOGEN UR STRIP-MCNC: NORMAL MG/DL (ref 0–2)
WBC #/AREA URNS AUTO: <1 /HPF (ref 0–5)

## 2019-12-05 PROCEDURE — G0463 HOSPITAL OUTPT CLINIC VISIT: HCPCS | Mod: 25

## 2019-12-05 PROCEDURE — 40000809 ZZH STATISTIC NO DOCUMENTATION TO SUPPORT CHARGE

## 2019-12-05 PROCEDURE — 59025 FETAL NON-STRESS TEST: CPT

## 2019-12-05 PROCEDURE — 81001 URINALYSIS AUTO W/SCOPE: CPT | Performed by: OBSTETRICS & GYNECOLOGY

## 2019-12-05 PROCEDURE — 51798 US URINE CAPACITY MEASURE: CPT

## 2019-12-05 ASSESSMENT — MIFFLIN-ST. JEOR: SCORE: 1971.88

## 2019-12-05 NOTE — TELEPHONE ENCOUNTER
Pt called to report inability to void urine since yesterday at 11:30pm. Pt states she has sensation of full bladder and has been attempting several techniques to void (changing positions, warm water over perineum, etc).     Pt endorses instances of urinary retention in past but never for this amount of time and is generally able to resolve by position changes.    Denies vomiting, diarrhea. Endorses good hydration.    Discussed with Dr. Hill who recommends [facility].    Nurse informed patient who agrees with plan. Report called to charge RN at L&D.

## 2019-12-05 NOTE — PLAN OF CARE
Pt able to void.  She missed hat but reports voiding a large amount.  Bladder scanned several times, 9/10 showing less than 200, averaging around 100.  Verbal or to discharge to home and return for urinary retention.  Pt okay with that plan.

## 2019-12-05 NOTE — PLAN OF CARE
33weeks gestation, here with c/ourinary retention.  Fetal and uterine monitors applied, intake interview completed.  FHTs are reactive, toco quiet, medical history remarkable for GDM, on insulin at HS.  Dr Hill notified of patient's arrival and RN assessment.  Per Dr Hill, get bladder scan and straight cath.  Scan showed >1000.  Per conversation with Dr Hill prior to scan, If pt has greater than 1000ml of urine in bladder, she would like it emptied completely.  PT unalbe to void, straight cathed for 1450.  UA/UC sent.  Dr Hill assessed.  Encourage PO fluids and wait to void.  Pt moved to room 458 for comfort and privacy in bathroom.  Call light in place.  Pt to call out PRN.

## 2019-12-05 NOTE — DISCHARGE INSTRUCTIONS
Discharge Instruction for Undelivered Patients      You were seen for: urinary retention  We Consulted: Dr Hill  You had (Test or Medicine):ludwig cath     Diet:   Drink 8 to 12 glasses of liquids (milk, juice, water) every day.  You may eat meals and snacks.  To manager your diabetes, follow the guidelines for eating and drinking given to you by your Clinic Provider or Diabetes Educator.       Activity:  {Activity:8208331}     Call your provider if you notice:  Swelling in your face or increased swelling in your hands or legs.  Headaches that are not relieved by Tylenol (acetaminophen).  Changes in your vision (blurring: seeing spots or stars.)  Nausea (sick to your stomach) and vomiting (throwing up).   Weight gain of 5 pounds or more per week.  Heartburn that doesn't go away.  Signs of bladder infection: pain when you urinate (use the toilet), need to go more often and more urgently.  The bag of augustine (rupture of membranes) breaks, or you notice leaking in your underwear.  Bright red blood in your underwear.  Abdominal (lower belly) or stomach pain.  For first baby: Contractions (tightening) less than 5 minutes apart for one hour or more.  Second (plus) baby: Contractions (tightening) less than 10 minutes apart and getting stronger.  *If less than 34 weeks: Contractions (tightenings) more than 6 times in one hour.  Increase or change in vaginal discharge (note the color and amount)  Other: ***    Follow-up:  {OB DC INST NOT ADMITTED OTHER:6592197}

## 2019-12-06 ENCOUNTER — HOSPITAL ENCOUNTER (OUTPATIENT)
Facility: CLINIC | Age: 34
Discharge: HOME | End: 2019-12-06
Attending: OBSTETRICS & GYNECOLOGY | Admitting: OBSTETRICS & GYNECOLOGY
Payer: COMMERCIAL

## 2019-12-06 ENCOUNTER — RECORDS - HEALTHEAST (OUTPATIENT)
Dept: ADMINISTRATIVE | Facility: OTHER | Age: 34
End: 2019-12-06

## 2019-12-06 ENCOUNTER — TELEPHONE (OUTPATIENT)
Dept: OBGYN | Facility: CLINIC | Age: 34
End: 2019-12-06

## 2019-12-06 ENCOUNTER — ANCILLARY PROCEDURE (OUTPATIENT)
Dept: ULTRASOUND IMAGING | Facility: CLINIC | Age: 34
End: 2019-12-06
Attending: OBSTETRICS & GYNECOLOGY
Payer: COMMERCIAL

## 2019-12-06 VITALS
HEIGHT: 67 IN | WEIGHT: 271 LBS | DIASTOLIC BLOOD PRESSURE: 81 MMHG | BODY MASS INDEX: 42.53 KG/M2 | SYSTOLIC BLOOD PRESSURE: 127 MMHG | TEMPERATURE: 97.7 F | RESPIRATION RATE: 18 BRPM | HEART RATE: 93 BPM

## 2019-12-06 DIAGNOSIS — O24.414 INSULIN CONTROLLED GESTATIONAL DIABETES MELLITUS (GDM) IN THIRD TRIMESTER: Primary | ICD-10-CM

## 2019-12-06 PROCEDURE — 76819 FETAL BIOPHYS PROFIL W/O NST: CPT

## 2019-12-06 PROCEDURE — G0463 HOSPITAL OUTPT CLINIC VISIT: HCPCS

## 2019-12-06 PROCEDURE — G0463 HOSPITAL OUTPT CLINIC VISIT: HCPCS | Mod: 25

## 2019-12-06 RX ORDER — BLOOD-GLUCOSE METER
EACH MISCELLANEOUS
Qty: 1 KIT | Refills: 0 | Status: SHIPPED | OUTPATIENT
Start: 2019-12-06 | End: 2022-09-22

## 2019-12-06 ASSESSMENT — MIFFLIN-ST. JEOR: SCORE: 1971.88

## 2019-12-06 NOTE — TELEPHONE ENCOUNTER
received call from Alejandra stating she needs PA for diabetic testing supplies.  Called pharmacy and advised to change order to whatever insurance will cover.  They need new order for One Touch meter, lancets and strips can be converted.  New meter Rx sent. Pt notified of status.

## 2019-12-06 NOTE — TELEPHONE ENCOUNTER
Received call from Alejandra stating she is unable to urinate again.  Her last urination was 2 hours ago and she has drank approximately 24 ounces of fluids since then.  She is feeling very uncomfortable with the need to urinate but is not able.  She has tried the self cares that have worked for her in the past without success.    Of note, this happened yesterday as well and she went to [facility] for cath.    Advised she go back to [facility] for cath and she agreed with plan.    Called [facility] and gave report to Cynthia. Pt sees the MDs.

## 2019-12-07 NOTE — PROGRESS NOTES
"[facility]  OB Triage Note    CC: Urinary retention    HPI: Ms. Alejandra Moss is a 34 year old  at 33w1d by ETD, who presents with urinary retention.  She had a similar episode yesterday when she could not void. She was seen in OB triage and required a straight cath. She has been voiding since that time up until 1400 today. She is starting to feel fullness in her bladder and she is uncomfortable. UA had been normal on last visit and she denies dysuria. She denies any previous history of urinary retention. She denies contractions, leaking fluid, vaginal bleeding.  + Good fetal movement.    Obstetric Complications  1. Cervical insufficiency s/p Shirodkar  2. IVF pregnanct  3. History of bowel resection for NEC as premature infant  4. GDMA2  5. Obesity      Prenatal Labs  Lab Results   Component Value Date    ABO A 2019    RH Pos 2019    AS Neg 2019    HEPBANG Nonreactive 2019    CHPCRT Negative 2019    GCPCRT Negative 2019    RUBELLAABIGG Immune 2019    HGB 13.0 2019       O:  Patient Vitals for the past 24 hrs:   BP Temp Temp src Pulse Resp Height Weight   19 1702 127/81 97.7  F (36.5  C) Oral 93 18 1.702 m (5' 7\") 122.9 kg (271 lb)     Gen: Well-appearing, NAD    Spec: [place] stitch visible. Normal physiologic discharge. Cervix is closed.   Cervix: Closed/50/0    FHT: , mod osmin,  accels, no decels  [employer]: 0 ctx in 10 mins    A/P:  Ms. Alejandra Moss is a 34 year old  at 33w1d by ETD here with urinary retention.  She required straight catheterization for 1300 ml. Afterward she was able to void. Physical exam did not show concerning prolapse or cervical change. Unknown etiology. Discussed with patient that she may need to learn how to self catheterize for home. If she returns again we should do bladder rest for 24 hours with bonner. No concerns for infection. Also recommended continuing to empty her bladder frequently at home " as having it stretch out from such large amounts can cause permanent damage to the muscle.   Discharged home after able to void.   #FWB: - Category I FHT    Leydi Fraser MD  Ob/Gyn, PGY-2  12/6/2019, 6:51 PM    The patient was reviewed with Dr. Fraser.  I agree with the above assessment and plan of care.  Should also consider an outpatient with urology or Dr. Talley regarding a plan for the remainder of the pregnancy.    Hedy Blakely MD, FACOG

## 2019-12-07 NOTE — DISCHARGE INSTRUCTIONS
Discharge Instruction for Undelivered Patients      You were seen for: Unable to empty bladder  We Consulted: [facility]      Diet:   Drink 8 to 12 glasses of liquids (milk, juice, water) every day.  You may eat meals and snacks.     Activity:  Call your doctor or nurse midwife if your baby is moving less than usual.     Call your provider if you notice:  Swelling in your face or increased swelling in your hands or legs.  Headaches that are not relieved by Tylenol (acetaminophen).  Changes in your vision (blurring: seeing spots or stars.)  Nausea (sick to your stomach) and vomiting (throwing up).   Weight gain of 5 pounds or more per week.  Heartburn that doesn't go away.  Signs of bladder infection: pain when you urinate (use the toilet), need to go more often and more urgently.  The bag of augustine (rupture of membranes) breaks, or you notice leaking in your underwear.  Bright red blood in your underwear.  Abdominal (lower belly) or stomach pain.  *If less than 34 weeks: Contractions (tightenings) more than 6 times in one hour.  Increase or change in vaginal discharge (note the color and amount)      Follow-up:  As scheduled in the clinic

## 2019-12-07 NOTE — PLAN OF CARE
Data: [facility] at 1650.   Reason for maternal/fetal assessment per patient is Bladder Problems  . Patient unable to void since 1400. Patient is a . Prenatal record reviewed.      OB History    Para Term  AB Living   1 0 0 0 0 0   SAB TAB Ectopic Multiple Live Births   0 0 0 0 0      # Outcome Date GA Lbr Mao/2nd Weight Sex Delivery Anes PTL Lv   1 Current               Medical History:   Past Medical History:   Diagnosis Date     Infertility of tubal origin     presumed due to intraabdominal adhesions     Obesity      Tonsillitis    . Gestational Age 33w1d. VSS. Cervix: closed.  Fetal movement present. Patient denies cramping, backache, vaginal discharge, pelvic pressure, UTI symptoms, GI problems, bloody show, vaginal bleeding, edema, headache, visual disturbances, epigastric or URQ pain, abdominal pain, rupture of membranes. Patient in triage last evening with same issue.   Action:  Bladder scan showed > 900 mL in bladder. Patient attempted to void again on her own and was unsuccessful. Patient agreed to straight catheterization. Straight cath for 1300 mL out. Patient reported great relief. Verbal consent for EFM. Triage assessment completed. EFM and [place] applied. Fetal assessment: Presumed adequate fetal oxygenation documented (see flow record). Patient instructed to report change in fetal movement, vaginal leaking of fluid or bleeding, abdominal pain, or any concerns related to the pregnancy to her nurse/physician. Patient able to void x 2 s/p straight cath.  Response: Dr. Fraser informed of patient arrival. Plan per provider is discharge home since patient was able to void on her own. Patient verbalized understanding of education and verbalized agreement with plan. Discharged ambulatory at 1855.

## 2019-12-09 ENCOUNTER — ANCILLARY PROCEDURE (OUTPATIENT)
Dept: ULTRASOUND IMAGING | Facility: CLINIC | Age: 34
End: 2019-12-09
Attending: OBSTETRICS & GYNECOLOGY
Payer: COMMERCIAL

## 2019-12-09 PROCEDURE — 76819 FETAL BIOPHYS PROFIL W/O NST: CPT

## 2019-12-10 ENCOUNTER — OFFICE VISIT (OUTPATIENT)
Dept: PHARMACY | Facility: CLINIC | Age: 34
End: 2019-12-10
Payer: COMMERCIAL

## 2019-12-10 DIAGNOSIS — R52 PAIN: ICD-10-CM

## 2019-12-10 DIAGNOSIS — K59.00 CONSTIPATION, UNSPECIFIED CONSTIPATION TYPE: ICD-10-CM

## 2019-12-10 DIAGNOSIS — O24.414 INSULIN CONTROLLED GESTATIONAL DIABETES MELLITUS (GDM) IN THIRD TRIMESTER: Primary | ICD-10-CM

## 2019-12-10 DIAGNOSIS — O09.293: ICD-10-CM

## 2019-12-10 PROCEDURE — 99606 MTMS BY PHARM EST 15 MIN: CPT | Performed by: PHARMACIST

## 2019-12-10 PROCEDURE — 99607 MTMS BY PHARM ADDL 15 MIN: CPT | Performed by: PHARMACIST

## 2019-12-10 NOTE — PROGRESS NOTES
SUBJECTIVE/OBJECTIVE:                Alejandra Moss is a 34 year old female seen for a follow-up visit for Medication Therapy Management.  She was referred to me from Dr. Rodriguez.     Chief Complaint: Follow up from GDM visit with Dr. Rodriguez on 12/03/2019.  Patient is currently completing a 2 by 2 titration of her insulin glargine that was started at this visit.   Personal Healthcare Goals: healthy pregnancy    Medication Adherence/Access:  no issues reported.  Alejandra did state the timing of her taking her blood glucose measurement after breakfast is difficult to time with her commute to work and she often finds herself eating in her car as a result.    GDM: Alejandra Moss is a 34 year old at 33w5d for follow-up of gestational diabetes for glycemic log review.  Current therapy:   Long-Acting Insulin, 21 units at bedtime    She is checking her blood sugars regularly, including 1-hour post-prandial.    Blood sugars are as follows: (complete table or insert clinical media)    Date Fasting Post-Breakfast Post-Lunch Post-Dinner Insulin   12/03 110 - 132 119 15 units   12/04 112 151 135 144 15 units   12/05 116 108 140 97 17 units   12/06 104 145 138 168 17 units   12/07 96 139 124 114 19 units   12/08 106 180 / 131 (2 hr) 130 109 19 units   12/09 107 114 126 134 21units   12/10 110 159                          Pregnancy: She is taking PNV and vitamin D3. She is also on ASA for preclampsia prevention.     Constipation: Using Miralax- one capful every other to every third day.  States that this helps her symptoms.     Pain: had some muscle pain last week and used one dose of APAP to help with the pain, reported as helpful.  No longer taking.     Today's Vitals:    BP Readings from Last 3 Encounters:   12/06/19 127/81   12/05/19 125/84   12/03/19 117/79           ASSESSMENT:              Current medications were reviewed today as discussed above.      Medication Adherence: excellent, no issues identified. She may benefit  from education regarding timing of checking post-prandial readings to make this process easier for her schedule.    GDM: Alejandra Moss is a 34 year old at 33w5d for follow-up of gestational diabetes for glycemic log review.  After reviewing her blood sugars, less than 50% of her fasting readings are at target. Based on this, I recommend Continued dietary and life-style modifications, Adjustment of insulin therapy. The readings are trending in the right direction with the current titration regimen and Alejandra is handling the insulin management well.  She may benefit from continuation of this titration to get blood sugar at goal in a timely manner.     Pregnancy: appropriate use of supplements in pregnancy, no concerns    Constipation: medication safe for use in pregnancy and reported as effective, no concerns     Pain: medication safe for use in pregnancy and reported as effective, no concerns        PLAN:                  Continue dietary modifications, regular exercise as able  Continue to check blood glucose 4x daily  Bring glucose log to all appointments  Needs 2-hr GTT at 6 wks post-partum  Increase long-acting insulin by 2 units if fasting readings from 2 days are not at goal, continue to do so until at least 50% of fasting readings are at goal.       Educate patient:  -Continue to increase your Basaglar by 2 units every 2 days if the fasting glucose is not less than 96.    -For your after breakfast reading, it's OK to check  minutes after breakfast.  Please deanna this on your log.       I spent 30 minutes with this patient today. All changes were made via collaborative practice agreement with Dr. Rodriguez. A copy of the visit note was provided to the patient's referring provider.     Will follow up in one week (12/16) with Dr. Rodriguez for glucose log review.    The patient was given a summary of these recommendations as an after visit summary.    Thank you for the opportunity to participate in the care of this  patient.  Minal Paulino Pharm IV on 12/10/2019 at 12:28 PM    I was present with the student during the assessment and I approve of the above plan and documentation.        Cathryn Barrett, Pharm.D., BCPS

## 2019-12-12 ENCOUNTER — ANCILLARY PROCEDURE (OUTPATIENT)
Dept: ULTRASOUND IMAGING | Facility: CLINIC | Age: 34
End: 2019-12-12
Attending: OBSTETRICS & GYNECOLOGY
Payer: COMMERCIAL

## 2019-12-12 ENCOUNTER — OFFICE VISIT (OUTPATIENT)
Dept: OBGYN | Facility: CLINIC | Age: 34
End: 2019-12-12
Payer: COMMERCIAL

## 2019-12-12 VITALS
WEIGHT: 272 LBS | DIASTOLIC BLOOD PRESSURE: 81 MMHG | SYSTOLIC BLOOD PRESSURE: 128 MMHG | HEART RATE: 99 BPM | BODY MASS INDEX: 42.6 KG/M2

## 2019-12-12 DIAGNOSIS — Z82.49 FAMILY HISTORY OF DVT: ICD-10-CM

## 2019-12-12 DIAGNOSIS — O09.93 HRP (HIGH RISK PREGNANCY), THIRD TRIMESTER: Primary | ICD-10-CM

## 2019-12-12 DIAGNOSIS — O09.293: ICD-10-CM

## 2019-12-12 DIAGNOSIS — O24.414 INSULIN CONTROLLED GESTATIONAL DIABETES MELLITUS (GDM) IN THIRD TRIMESTER: ICD-10-CM

## 2019-12-12 PROCEDURE — 76819 FETAL BIOPHYS PROFIL W/O NST: CPT

## 2019-12-12 PROCEDURE — G0463 HOSPITAL OUTPT CLINIC VISIT: HCPCS | Mod: ZF

## 2019-12-12 NOTE — PROGRESS NOTES
"[facility]  Return OB Visit    S: Patient is doing well. Was recently seen in triage for urinary retention, this has resolved. Has no problems urinating as long as she doesn't \"gulp down\" water and she tries to urinate more frequently. Denies vaginal bleeding, vaginal discharge, LOF, contractions.  Reports good fetal movement.     Fastings blood glucoses have been mostly low 100s. Postprandials have been mostly under goal. She is seeing Dr. Rodriguez next week.    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 Pregnancy is complicated by cervical insufficiency, gestational diabetes, obesity, extensive intra-abdominal adhesions, and IVF pregnancy.    #Cervical insufficiency  - pj cerclage in place, scheduled removal on  with Dr. Bermudez at 36 weeks gestation  - cervix 13.9 mm on transabdominal view 10/16 with funneling present extending to stitch but not through stitch     #PNC  - Rh pos, Fernanda neg, hgb 15.0, plt 301, nl hgb eletrophoresis  - HIV NR, Hep B S Antigen NR, rubella immune, varicella immune, TSH 0.76, hgbA1c 5.8%, RPR NR, gc/chlam neg  - pap NILM, HPV neg (2019)  -  ALT 19 AST 16 (), creatinine 0.68, UPC too low to calculate  - UCx >10K mixed genital td, nl AFP  - s/p flu and TDAP per patient     #GDMA2  - s/p failed 3 hr GTT , on insulin  - Next appointment for insulin management on  with Dr. Rodriguez  - discussed healthy diet and exercise, plans to resume light swimming. Discussed avoidance of weightbearing, lunges, running or other intense cardio given cervical insufficiency  - taking ASA for preE prevention  - Continue twice " weekly BPPs, q4 week growth scans    Return to clinic in 2 weeks.     Staffed with Dr. Hill.     Sylvia Juan MD  Obstetrics and Gynecology, PGY-2  December 12, 2019     The Patient was seen in [facility] Continuity Clinic by SYLVIA JUAN.  I reviewed the history & exam. Assessment and plan were jointly made.    Milagros Hill MD

## 2019-12-12 NOTE — NURSING NOTE
Chief Complaint   Patient presents with     Prenatal Care     GRABIEL 34 weeks   Jazmine Umaña LPN

## 2019-12-16 ENCOUNTER — ANCILLARY PROCEDURE (OUTPATIENT)
Dept: ULTRASOUND IMAGING | Facility: CLINIC | Age: 34
End: 2019-12-16
Attending: OBSTETRICS & GYNECOLOGY
Payer: COMMERCIAL

## 2019-12-16 ENCOUNTER — RECORDS - HEALTHEAST (OUTPATIENT)
Dept: ADMINISTRATIVE | Facility: OTHER | Age: 34
End: 2019-12-16

## 2019-12-16 ENCOUNTER — OFFICE VISIT (OUTPATIENT)
Dept: INTERNAL MEDICINE | Facility: CLINIC | Age: 34
End: 2019-12-16
Attending: INTERNAL MEDICINE
Payer: COMMERCIAL

## 2019-12-16 VITALS
SYSTOLIC BLOOD PRESSURE: 126 MMHG | BODY MASS INDEX: 42.7 KG/M2 | DIASTOLIC BLOOD PRESSURE: 83 MMHG | WEIGHT: 272.6 LBS | HEART RATE: 91 BPM

## 2019-12-16 DIAGNOSIS — O24.419 GDM, CLASS A2: ICD-10-CM

## 2019-12-16 DIAGNOSIS — O24.414 INSULIN CONTROLLED GESTATIONAL DIABETES MELLITUS (GDM) IN THIRD TRIMESTER: Primary | ICD-10-CM

## 2019-12-16 DIAGNOSIS — O24.414 INSULIN CONTROLLED GESTATIONAL DIABETES MELLITUS (GDM) IN THIRD TRIMESTER: ICD-10-CM

## 2019-12-16 PROCEDURE — G0463 HOSPITAL OUTPT CLINIC VISIT: HCPCS | Mod: ZF

## 2019-12-16 PROCEDURE — 76819 FETAL BIOPHYS PROFIL W/O NST: CPT

## 2019-12-16 NOTE — PROGRESS NOTES
[facility] - [facility] Medicine    HPI:  Alejandra Moss is a 34 year old  at 34w4d for follow-up of gestational diabetes for insulin adjustment and glycemic log review.    Patient has been slowly titrating basal insulin. She reports that her fasting and post-breakfast blood glucose values have been elevated.     Current therapy:   Long-Acting Insulin, 27 units at bedtime    She is checking her blood sugars regularly, including Fasting and 1-hour post-prandial.    Blood sugars are as follows: (complete table or insert clinical media)    Date Fasting Post-Breakfast Post-Lunch Post-Dinner    113 187 113     102 153 116 165    106 110  107    105 124 118 127    113 144                     Diabetes Symptoms:   none    10 point ROS of systems including Constitutional, Eyes, Respiratory, Cardiovascular, Gastroenterology, Genitourinary, Integumentary, Muscularskeletal, Psychiatric were all negative except for pertinent positives noted in my HPI.    Physical Exam:   /83   Pulse 91   Wt 123.7 kg (272 lb 9.6 oz)   Breastfeeding Yes   BMI 42.70 kg/m    Gen: Pleasant female, in NAD  Resp: lungs CAB  CV: Heart RRR, no MRG  Abd: Gravid abdomen, non-tender, nl bowel sounds  Ext: WWP, no LE edema  Neuro: AOx3, no focal deficits      Assessment:     Alejandra Moss is a 34 year old  at 34w4d for follow-up of gestational diabetes for insulin adjustment and glycemic log review.  After reviewing her blood sugars, less than 50% are at target. Based on this, I recommend Continued dietary and life-style modifications, Adjustment of insulin therapy.     Plan:   Continue dietary modifications, regular exercise as able  Continue to check blood glucose 4x daily  Bring glucose log to all appointments  Needs 2-hr GTT at 6 wks post-partum  For post-prandial glucoses >140 (1-hr) or >120 (2-hr), add rapid-acting insulin at 0.1 unit/kg per meal, 2 units per meal with breakfast only  Increase long-acting  insulin by 3 units  Follow-up with Dr. Norman Mckenzie or Dr. Rodriguez in 1 week(s)    Rochelle Rodriguez MD

## 2019-12-16 NOTE — LETTER
2019       RE: Alejandra Moss  [address]  [place] [address]     Dear Colleague,    Thank you for referring your patient, Alejandra Moss, to the [facility]  at [facility]. Please see a copy of my visit note below.  [facility] - [facility]    HPI:  Alejandra Moss is a 34 year old  at 34w4d for follow-up of gestational diabetes for insulin adjustment and glycemic log review.    Patient has been slowly titrating basal insulin. She reports that her fasting and post-breakfast blood glucose values have been elevated.     Current therapy:   Long-Acting Insulin, 27 units at bedtime    She is checking her blood sugars regularly, including Fasting and 1-hour post-prandial.    Blood sugars are as follows: (complete table or insert clinical media)    Date Fasting Post-Breakfast Post-Lunch Post-Dinner    113 187 113     102 153 116 165    106 110  107    105 124 118 127    113 144                     Diabetes Symptoms:   none    10 point ROS of systems including Constitutional, Eyes, Respiratory, Cardiovascular, Gastroenterology, Genitourinary, Integumentary, Muscularskeletal, Psychiatric were all negative except for pertinent positives noted in my HPI.    Physical Exam:   /83   Pulse 91   Wt 123.7 kg (272 lb 9.6 oz)   Breastfeeding Yes   BMI 42.70 kg/m     Gen: Pleasant female, in NAD  Resp: lungs CAB  CV: Heart RRR, no MRG  Abd: Gravid abdomen, non-tender, nl bowel sounds  Ext: WWP, no LE edema  Neuro: AOx3, no focal deficits      Assessment:     Alejandra Moss is a 34 year old  at 34w4d for follow-up of gestational diabetes for insulin adjustment and glycemic log review.  After reviewing her blood sugars, less than 50% are at target. Based on this, I recommend Continued dietary and life-style modifications, Adjustment of insulin therapy.     Plan:   Continue dietary modifications, regular exercise as  able  Continue to check blood glucose 4x daily  Bring glucose log to all appointments  Needs 2-hr GTT at 6 wks post-partum  For post-prandial glucoses >140 (1-hr) or >120 (2-hr), add rapid-acting insulin at 0.1 unit/kg per meal, 2 units per meal with breakfast only  Increase long-acting insulin by 3 units  Follow-up with Dr. Norman Mckenzie or Dr. Rodriguez in 1 week(s)    Rochelle Rodriguez MD

## 2019-12-16 NOTE — NURSING NOTE
Chief Complaint   Patient presents with     Follow Up     OB 34 weeks and 4 days Diabetic check   Jazmine Umaña LPN

## 2019-12-17 PROBLEM — Z36.89 ENCOUNTER FOR TRIAGE IN PREGNANT PATIENT: Status: RESOLVED | Noted: 2019-12-05 | Resolved: 2019-12-17

## 2019-12-17 PROBLEM — O34.33: Status: RESOLVED | Noted: 2019-10-14 | Resolved: 2019-12-17

## 2019-12-18 NOTE — PROGRESS NOTES
[facility]  Return OB Visit    S: Reports no complaints--[facility], since has been urinating timed every 1hr with no problems. Denies vaginal bleeding, vaginal discharge, LOF, contractions.  Reports good fetal movement.     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 Pregnancy is complicated by cervical insufficiency, gestational diabetes, obesity, extensive intra-abdominal adhesions, and IVF pregnancy.    #Cervical insufficiency  - tara cerclage in place, scheduled removal on  with Dr. Bermudez at 36 weeks gestation  - cervix 13.9 mm on transabdominal view 10/16 with funneling present extending to stitch but not through stitch     #PNC  - Rh pos, Fernanda neg, hgb 15.0, plt 301, nl hgb eletrophoresis  - HIV NR, Hep B S Antigen NR, rubella immune, varicella immune, TSH 0.76, hgbA1c 5.8%, RPR NR, gc/chlam neg  - pap NILM, HPV neg (2019)  -  ALT 19 AST 16 (), creatinine 0.68, UPC too low to calculate  - UCx >10K mixed genital td, nl AFP  - s/p flu and TDAP this pregnancy  - GBS today  - Discussed preE symptoms and when to come to triage      #GDMA2  - s/p failed 3 hr GTT , on insulin 30units at bedtime long-ating and 2units short-acting w/ breakfast only  - Next appointment for insulin management on  with Dr. Rodriguez  - taking ASA for preE prevention  - Continue twice weekly BPPs, q4 week growth scans - last   EFW = 3024 gram, based off of AC and FL. AC =  >99%. FL = 42%. Difficultly  measuring fetal head     Return to clinic for 2x/wk BPPs, cerclage removal next week instead of MD appt then RTC in 2 weeks, already scheduled sweetie Corcoran    Staffed with Dr. Lee Finley MD   12/20/19     Patient was seen by the resident in Continuity of Care Clinic.  I reviewed the history & exam.  The patient's assessment and plan were made jointly.    Nely Howard MD MPH

## 2019-12-19 ENCOUNTER — ANCILLARY PROCEDURE (OUTPATIENT)
Dept: ULTRASOUND IMAGING | Facility: CLINIC | Age: 34
End: 2019-12-19
Attending: OBSTETRICS & GYNECOLOGY
Payer: COMMERCIAL

## 2019-12-19 DIAGNOSIS — O24.414 INSULIN CONTROLLED GESTATIONAL DIABETES MELLITUS (GDM) IN THIRD TRIMESTER: ICD-10-CM

## 2019-12-19 PROCEDURE — 76819 FETAL BIOPHYS PROFIL W/O NST: CPT

## 2019-12-20 ENCOUNTER — HOSPITAL ENCOUNTER (OUTPATIENT)
Facility: CLINIC | Age: 34
Discharge: HOME | End: 2019-12-20
Attending: OBSTETRICS & GYNECOLOGY | Admitting: OBSTETRICS & GYNECOLOGY
Payer: COMMERCIAL

## 2019-12-20 ENCOUNTER — TELEPHONE (OUTPATIENT)
Dept: OBGYN | Facility: CLINIC | Age: 34
End: 2019-12-20

## 2019-12-20 ENCOUNTER — RECORDS - HEALTHEAST (OUTPATIENT)
Dept: ADMINISTRATIVE | Facility: OTHER | Age: 34
End: 2019-12-20

## 2019-12-20 ENCOUNTER — OFFICE VISIT (OUTPATIENT)
Dept: OBGYN | Facility: CLINIC | Age: 34
End: 2019-12-20
Attending: STUDENT IN AN ORGANIZED HEALTH CARE EDUCATION/TRAINING PROGRAM
Payer: COMMERCIAL

## 2019-12-20 VITALS
DIASTOLIC BLOOD PRESSURE: 85 MMHG | TEMPERATURE: 98.3 F | HEART RATE: 82 BPM | SYSTOLIC BLOOD PRESSURE: 132 MMHG | RESPIRATION RATE: 18 BRPM

## 2019-12-20 VITALS
WEIGHT: 273 LBS | HEART RATE: 84 BPM | BODY MASS INDEX: 42.76 KG/M2 | SYSTOLIC BLOOD PRESSURE: 122 MMHG | DIASTOLIC BLOOD PRESSURE: 81 MMHG

## 2019-12-20 DIAGNOSIS — O09.529 SUPERVISION OF HIGH-RISK PREGNANCY OF ELDERLY MULTIGRAVIDA: Primary | ICD-10-CM

## 2019-12-20 PROBLEM — Z36.89 ENCOUNTER FOR TRIAGE IN PREGNANT PATIENT: Status: ACTIVE | Noted: 2019-12-20

## 2019-12-20 LAB
ALBUMIN UR-MCNC: NEGATIVE MG/DL
APPEARANCE UR: CLEAR
BILIRUB UR QL STRIP: NEGATIVE
COLOR UR AUTO: ABNORMAL
GLUCOSE BLDC GLUCOMTR-MCNC: 87 MG/DL (ref 70–99)
GLUCOSE UR STRIP-MCNC: NEGATIVE MG/DL
HGB UR QL STRIP: NEGATIVE
KETONES UR STRIP-MCNC: 10 MG/DL
LEUKOCYTE ESTERASE UR QL STRIP: NEGATIVE
NITRATE UR QL: NEGATIVE
PH UR STRIP: 6 PH (ref 5–7)
RBC #/AREA URNS AUTO: 0 /HPF (ref 0–2)
SOURCE: ABNORMAL
SP GR UR STRIP: 1 (ref 1–1.03)
SQUAMOUS #/AREA URNS AUTO: <1 /HPF (ref 0–1)
UROBILINOGEN UR STRIP-MCNC: NORMAL MG/DL (ref 0–2)
WBC #/AREA URNS AUTO: <1 /HPF (ref 0–5)

## 2019-12-20 PROCEDURE — 59025 FETAL NON-STRESS TEST: CPT

## 2019-12-20 PROCEDURE — 82962 GLUCOSE BLOOD TEST: CPT

## 2019-12-20 PROCEDURE — G0463 HOSPITAL OUTPT CLINIC VISIT: HCPCS

## 2019-12-20 PROCEDURE — 40000809 ZZH STATISTIC NO DOCUMENTATION TO SUPPORT CHARGE

## 2019-12-20 PROCEDURE — G0463 HOSPITAL OUTPT CLINIC VISIT: HCPCS | Mod: 25

## 2019-12-20 PROCEDURE — G0463 HOSPITAL OUTPT CLINIC VISIT: HCPCS | Mod: ZF

## 2019-12-20 PROCEDURE — 81001 URINALYSIS AUTO W/SCOPE: CPT | Performed by: OBSTETRICS & GYNECOLOGY

## 2019-12-20 PROCEDURE — 87653 STREP B DNA AMP PROBE: CPT | Performed by: STUDENT IN AN ORGANIZED HEALTH CARE EDUCATION/TRAINING PROGRAM

## 2019-12-20 NOTE — PROVIDER NOTIFICATION
12/20/19 0428   Provider Notification   Provider Name/Title Dr. Hickman   Method of Notification In Department   Request Evaluate - Remote   Notification Reason Other (Comment)     Discussed plan to straight cath and get UA. If small amount, discuss discharge.

## 2019-12-20 NOTE — TELEPHONE ENCOUNTER
Patient called with concern that she is unable to void. This occurred earlier in the pregnancy, two days in a row (12/6). Since that time, she has tried to drink small amounts and void regularly. She states that this time, she fell asleep for 3 hours in a row and woke up feeling like she needed to empty her bladder, but has been unable to. She has tried different positions and maneuvers but has not been able to urinate. She denies contractions, loss of fluid vaginal bleeding and notes good fetal movement.     I recommended that she present for evaluation given this symptoms. We will plan to straight catheterize her, and re-attempt a trial of void.     She verbalized her understanding and will head in. Labor and delivery was made aware.     Leena Flores MD 12/20/2019 2:05 AM

## 2019-12-20 NOTE — LETTER
2019       RE: Alejandra Moss  [address] [place] [facility] [address]     Dear Colleague,    Thank you for referring your patient, Alejandra Moss, to the [facility] at [facility]. Please see a copy of my visit note below.    [facility]  Return OB Visit    S: Reports no complaints--was seen in [facility] overnight for urinary retention, since has been urinating timed every 1hr with no problems. Denies vaginal bleeding, vaginal discharge, LOF, contractions.  Reports good fetal movement.     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 Pregnancy is complicated by cervical insufficiency, gestational diabetes, obesity, extensive intra-abdominal adhesions, and IVF pregnancy.    #Cervical insufficiency  - pj cerclage in place, scheduled removal on  with Dr. Bermudez at 36 weeks gestation  - cervix 13.9 mm on transabdominal view 10/16 with funneling present extending to stitch but not through stitch     #PNC  - Rh pos, Fernanda neg, hgb 15.0, plt 301, nl hgb eletrophoresis  - HIV NR, Hep B S Antigen NR, rubella immune, varicella immune, TSH 0.76, hgbA1c 5.8%, RPR NR, gc/chlam neg  - pap NILM, HPV neg (2019)  -  ALT 19 AST 16 (), creatinine 0.68, UPC too low to calculate  - UCx >10K mixed genital td, nl AFP  - s/p flu and TDAP  this pregnancy  - GBS today  - Discussed preE symptoms and when to come to triage      #GDMA2  - s/p failed 3 hr GTT , on insulin 30units at bedtime  long-ating and 2units short-acting w/ breakfast only  - Next appointment for insulin management on 12/23 with Dr. Rodriguez  - taking ASA for preE prevention  - Continue twice weekly BPPs, q4 week growth scans - last 12/12  EFW = 3024 gram, based off of AC and FL. AC =  >99%. FL = 42%. Difficultly measuring fetal head     Return to clinic for 2x/wk BPPs, cerclage removal next week instead of MD appt then RTC in 2 weeks, already scheduled sweetie Corcoran    Staffed with Dr. Lee Finley MD   12/20/19     Patient was seen by the resident in Continuity of Care Clinic.  I reviewed the history & exam.  The patient's assessment and plan were made jointly.      Nely Howard MD MPH

## 2019-12-20 NOTE — PROGRESS NOTES
Triage Progress Note    CC: Urinary retention    S: Alejandra Moss is a 34 year old old  at 35w1d by ETD who presents for the above complaint. Patient states she has been having no trouble voiding as long as she goes every 2 hours, however tonight she fell asleep for 3 hours. She woke up with the urge to urinate but was unable to, despite different positions. She denies vaginal bleeding or LOF and is feeling normal fetal movement. Endorses some pink tinged discharge earlier this evening but none since. Denies dysuria or foul odor of urine prior to this.     She has been seen in triage twice before on  and  for the same issue. At that time she had a negative UA and no symptoms of dysuria. Unclear etiology of her urinary retention.     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   - GDMA2  - Obesity     OB history:  OB History    Para Term  AB Living   1 0 0 0 0 0   SAB TAB Ectopic Multiple Live Births   0 0 0 0 0      # Outcome Date GA Lbr Mao/2nd Weight Sex Delivery Anes PTL Lv   1 Current                Past Medical History:   Diagnosis Date     Infertility of tubal origin     presumed due to intraabdominal adhesions     Obesity      Tonsillitis        Past Surgical History:   Procedure Laterality Date     CERCLAGE CERVICAL N/A 2019    Procedure: CERCLAGE, CERVIX, VAGINAL APPROACH;  Surgeon: Cathryn Bermudez MD;  Location: [facility]     LAPAROTOMY EXPLORATORY       SMALL BOWEL RESECTION       TONSILLECTOMY, ADENOIDECTOMY ADULT, COMBINED  2013    Procedure: COMBINED TONSILLECTOMY, ADENOIDECTOMY ADULT;  TONSILLECTOMY ;  Surgeon: Prateek Sifuentes MD;  Location: [facility]     WRIST SURGERY         No current facility-administered medications for this encounter.      No Known Allergies    Social History     Socioeconomic History     Marital status:      Spouse name: Not  on file     Number of children: Not on file     Years of education: Not on file     Highest education level: Not on file   Occupational History     Not on file   Social Needs     Financial resource strain: Not on file     Food insecurity:     Worry: Not on file     Inability: Not on file     Transportation needs:     Medical: Not on file     Non-medical: Not on file   Tobacco Use     Smoking status: Never Smoker     Smokeless tobacco: Never Used   Substance and Sexual Activity     Alcohol use: Not Currently     Comment: 1-2x/week     Drug use: Never     Sexual activity: Yes     Partners: Male   Lifestyle     Physical activity:     Days per week: Not on file     Minutes per session: Not on file     Stress: Not on file   Relationships     Social connections:     Talks on phone: Not on file     Gets together: Not on file     Attends [employer] service: Not on file     Active member of club or organization: Not on file     Attends meetings of clubs or organizations: Not on file     Relationship status: Not on file     Intimate partner violence:     Fear of current or ex partner: Not on file     Emotionally abused: Not on file     Physically abused: Not on file     Forced sexual activity: Not on file   Other Topics Concern     Not on file   Social History Narrative    ** Merged History Encounter **            O:  Vitals:    12/20/19 0305   BP: 132/85   Pulse: 82   Resp: 18   Temp: 98.3  F (36.8  C)   TempSrc: Oral     General: alert, oriented female, resting in bed in NAD  CV: well perfused  Lungs: nonlabored respirations on room air  Abdomen: soft, gravid, non-tender  Extremities: BLEs non-tender, trace edema    FHT: baseline 135, moderate variability, present accelerations, no decelerations  [place]: 2 in 10 minutes, patient not feeling them    Results for orders placed or performed during the hospital encounter of 12/20/19   UA with Microscopic reflex to Culture     Status: Abnormal   Result Value Ref Range    Color Urine  Straw     Appearance Urine Clear     Glucose Urine Negative NEG^Negative mg/dL    Bilirubin Urine Negative NEG^Negative    Ketones Urine 10 (A) NEG^Negative mg/dL    Specific Gravity Urine 1.003 1.003 - 1.035    Blood Urine Negative NEG^Negative    pH Urine 6.0 5.0 - 7.0 pH    Protein Albumin Urine Negative NEG^Negative mg/dL    Urobilinogen mg/dL Normal 0.0 - 2.0 mg/dL    Nitrite Urine Negative NEG^Negative    Leukocyte Esterase Urine Negative NEG^Negative    Source Catheterized Urine     WBC Urine <1 0 - 5 /HPF    RBC Urine 0 0 - 2 /HPF    Squamous Epithelial /HPF Urine <1 0 - 1 /HPF       A/P:  34 year old  at 35w1d by ETD presenting for urinary retention.     #Urinary retention  - Straight cath for 1000 ml. Patient was able to spontaneously void 400 ml with an immediate straight cath of 150 ml afterwards  - Etiology of patient's retention likely distention of the bladder over a certain volume causes displacement of the bladder out of the pelvis, with subsequent kinking of urethra. Patient has been able to maintain spontaneous voiding by scheduling voids every 2 hours. Advised her to continue with timed voids. If she continues to require straight catheterization in triage, will plan for straight cath teaching for patient and partner (since patient unlikely to be able to straight cath herself at this stage of pregnancy)   - UA wnl    #Fetal well-being  - Category I FHT, reactive    Dispo: Discharge to home. Keep appointment with Dr. Finley today.     Sylvia Hickman MD  OB/GYN Resident, PGY-2  2019 2:40 AM     Patient seen, evaluated by me and discussed with resident. Continue frequent scheduled voids and plan for return to triage with symptoms of urinary retention. Rare contractions, asymptomatic, improved with emptying bladder, patient to return with symptomatic contractions or any sign of vaginal bleeding or pink discharge.     Leena Flores MD 2019 1:24 AM

## 2019-12-20 NOTE — PROVIDER NOTIFICATION
12/20/19 0440   Provider Notification   Provider Name/Title Dr. Hickman/Dr. Flores   Method of Notification In Department   Request Evaluate - Remote   Notification Reason Other (Comment)     Updated of straight cath of 150cc and collection of UA. Advised to wait for UA results, and will plan to discharge if normal.

## 2019-12-20 NOTE — PLAN OF CARE
Pt arrived to the unit with urinary retention. Alerted provider of pt's arrival, plan to straight cath, administer P.O. fluids, and have pt try to void independently. Pt denies bleeding, cramping, and leaking of fluids.

## 2019-12-20 NOTE — PLAN OF CARE
Data: Patient [facility] the [facility] at 0245.   Reason for maternal/fetal assessment per patient is Urinary Retention (Pt unable to void)  . Patient is a . Prenatal record reviewed.      OB History    Para Term  AB Living   1 0 0 0 0 0   SAB TAB Ectopic Multiple Live Births   0 0 0 0 0      # Outcome Date GA Lbr Moa/2nd Weight Sex Delivery Anes PTL Lv   1 Current               Medical History:   Past Medical History:   Diagnosis Date     Infertility of tubal origin     presumed due to intraabdominal adhesions     Obesity      Tonsillitis    . Gestational Age 35w1d. VSS.Fetal movement present. Patient denies cramping, backache, vaginal discharge, pelvic pressure, UTI symptoms, GI problems, bloody show, vaginal bleeding, edema, headache, visual disturbances, epigastric or URQ pain, abdominal pain, rupture of membranes.   Action: Verbal consent for EFM. Triage assessment completed. EFM applied for duration of admission. Uterine assessment occasional with irritability. Fetal assessment: Presumed adequate fetal oxygenation documented (see flow record). Patient education pamphlets given on fetal movement counts and urinary retention. Patient instructed to report change in fetal movement, vaginal leaking of fluid or bleeding, abdominal pain, or any concerns related to the pregnancy to her nurse/physician.   Response: Dr. Flores informed of UA and BG results. Plan per provider is discharge with patient to continue with regular OB appointments. Patient verbalized understanding of education and verbalized agreement with plan. Discharged ambulatory at 0612.

## 2019-12-20 NOTE — PROVIDER NOTIFICATION
12/20/19 0426   Provider Notification   Provider Name/Title Dr. Hickman   Method of Notification Electronic Page   Request Evaluate - Remote   Notification Reason Other (Comment)     Pt was able to void. Request next steps.

## 2019-12-20 NOTE — NURSING NOTE
Chief Complaint   Patient presents with     Prenatal Care     GRABIEL 35 weeks and 1 day   Jazmine Umaña LPN

## 2019-12-21 LAB
GP B STREP DNA SPEC QL NAA+PROBE: NEGATIVE
SPECIMEN SOURCE: NORMAL

## 2019-12-26 ENCOUNTER — ANESTHESIA (OUTPATIENT)
Dept: OBGYN | Facility: CLINIC | Age: 34
End: 2019-12-26

## 2019-12-26 ENCOUNTER — ANESTHESIA EVENT (OUTPATIENT)
Dept: OBGYN | Facility: CLINIC | Age: 34
End: 2019-12-26

## 2019-12-26 NOTE — ANESTHESIA PREPROCEDURE EVALUATION
Anesthesia Pre-Procedure Evaluation    Patient: Alejandra Moss   MRN:     [id number] Gender:   female   Age:    34 year old :      1985        Preoperative Diagnosis: Shirodkar cerclage present in third trimester [O34.33]  Hx of incompetent cervix, currently pregnant, third trimester [O09.293]   Procedure(s):  REMOVAL, CERCLAGE SUTURE     Past Medical History:   Diagnosis Date     Infertility of tubal origin     presumed due to intraabdominal adhesions     Obesity      Tonsillitis       Past Surgical History:   Procedure Laterality Date     CERCLAGE CERVICAL N/A 2019    Procedure: CERCLAGE, CERVIX, VAGINAL APPROACH;  Surgeon: Cathryn Bermudez MD;  Location:  L+D     LAPAROTOMY EXPLORATORY       SMALL BOWEL RESECTION       TONSILLECTOMY, ADENOIDECTOMY ADULT, COMBINED  2013    Procedure: COMBINED TONSILLECTOMY, ADENOIDECTOMY ADULT;  TONSILLECTOMY ;  Surgeon: Prateek Sifuentes MD;  Location: [facility]            Anesthesia Evaluation     . Pt has had prior anesthetic. Type: General and Regional    No history of anesthetic complications          ROS/MED HX    ENT/Pulmonary:  - neg pulmonary ROS     Neurologic:  - neg neurologic ROS     Cardiovascular:         METS/Exercise Tolerance:  >4 METS   Hematologic:  - neg hematologic  ROS       Musculoskeletal:         GI/Hepatic: Comment: Hx NEC. Likely difficult dissection (if an operative delivery is needed) given adhesions.        Renal/Genitourinary:         Endo: Comment: GDM    (+) Obesity, .      Psychiatric:  - neg psychiatric ROS       Infectious Disease:  - neg infectious disease ROS       Malignancy:         Other:                     JZG FV AN PHYSICAL EXAM    LABS:  CBC:   Lab Results   Component Value Date    WBC 10.4 2019    HGB 13.0 2019    HGB 14.0 2019    HCT 40.6 2019    HCT 41.7 2019     2019     2019     BMP:   Lab Results   Component Value Date    CR 0.68  "2019     COAGS: No results found for: PTT, INR, FIBR  POC:   Lab Results   Component Value Date    BGM 87 2019     OTHER:   Lab Results   Component Value Date    ALT 19 2019    AST 16 2019    TSH 0.76 2019        Preop Vitals    BP Readings from Last 3 Encounters:   19 122/81   19 132/85   19 126/83    Pulse Readings from Last 3 Encounters:   19 84   19 82   19 91      Resp Readings from Last 3 Encounters:   19 18   19 18   19 16    SpO2 Readings from Last 3 Encounters:   19 100%   13 97%   13 97%      Temp Readings from Last 1 Encounters:   19 36.8  C (98.3  F) (Oral)    Ht Readings from Last 1 Encounters:   19 1.702 m (5' 7\")      Wt Readings from Last 1 Encounters:   19 123.8 kg (273 lb)    Estimated body mass index is 42.76 kg/m  as calculated from the following:    Height as of 19: 1.702 m (5' 7\").    Weight as of 19: 123.8 kg (273 lb).     LDA:        Assessment:              Plan:   Anes. Type:  MAC; Spinal   Pre-Medication: None   Induction:  N/a   Airway: Native Airway   Access/Monitoring: PIV   Maintenance: N/a     Postop Plan:   Postop Pain: Regional  Postop Sedation/Airway: Not planned  Disposition: Inpatient/Admit     PONV Management: Adult Risk Factors: Female       Comments for Plan/Consent:  34 year old  female at 36w GA with cervical insufficiency and cervical cerclage in place presenting for removal of cerclage.    - spinale with chloroprocaine                  Prateek King III, MD  "

## 2019-12-31 DIAGNOSIS — O24.414 INSULIN CONTROLLED GESTATIONAL DIABETES MELLITUS (GDM) IN THIRD TRIMESTER: Primary | ICD-10-CM

## 2019-12-31 DIAGNOSIS — O24.419 GDM, CLASS A2: Primary | ICD-10-CM

## 2020-01-07 ENCOUNTER — COMMUNICATION - HEALTHEAST (OUTPATIENT)
Dept: HEALTH INFORMATION MANAGEMENT | Facility: CLINIC | Age: 35
End: 2020-01-07

## 2020-01-23 ENCOUNTER — AMBULATORY - HEALTHEAST (OUTPATIENT)
Dept: PEDIATRICS | Facility: CLINIC | Age: 35
End: 2020-01-23

## 2020-02-04 ENCOUNTER — TELEPHONE (OUTPATIENT)
Dept: OBGYN | Facility: CLINIC | Age: 35
End: 2020-02-04

## 2020-02-04 NOTE — TELEPHONE ENCOUNTER
Spoke with Alejandra who reports she went to home clinic ([facility] in [place]) for post-partum visit and has glucose visit scheduled.  Nurse agreed with plan.

## 2020-02-05 ENCOUNTER — RECORDS - HEALTHEAST (OUTPATIENT)
Dept: ADMINISTRATIVE | Facility: OTHER | Age: 35
End: 2020-02-05

## 2020-03-11 ENCOUNTER — HEALTH MAINTENANCE LETTER (OUTPATIENT)
Age: 35
End: 2020-03-11

## 2020-04-28 ENCOUNTER — COMMUNICATION - HEALTHEAST (OUTPATIENT)
Dept: FAMILY MEDICINE | Facility: CLINIC | Age: 35
End: 2020-04-28

## 2020-04-28 DIAGNOSIS — M62.89 PELVIC FLOOR DYSFUNCTION: ICD-10-CM

## 2020-08-27 ENCOUNTER — COMMUNICATION - HEALTHEAST (OUTPATIENT)
Dept: FAMILY MEDICINE | Facility: CLINIC | Age: 35
End: 2020-08-27

## 2020-09-27 ENCOUNTER — AMBULATORY - HEALTHEAST (OUTPATIENT)
Dept: NURSING | Facility: CLINIC | Age: 35
End: 2020-09-27

## 2021-01-03 ENCOUNTER — HEALTH MAINTENANCE LETTER (OUTPATIENT)
Age: 36
End: 2021-01-03

## 2021-04-15 ENCOUNTER — IMMUNIZATION (OUTPATIENT)
Dept: NURSING | Facility: CLINIC | Age: 36
End: 2021-04-15
Payer: COMMERCIAL

## 2021-04-15 PROCEDURE — 0001A PR COVID VAC PFIZER DIL RECON 30 MCG/0.3 ML IM: CPT

## 2021-04-15 PROCEDURE — 91300 PR COVID VAC PFIZER DIL RECON 30 MCG/0.3 ML IM: CPT

## 2021-04-25 ENCOUNTER — HEALTH MAINTENANCE LETTER (OUTPATIENT)
Age: 36
End: 2021-04-25

## 2021-05-06 ENCOUNTER — IMMUNIZATION (OUTPATIENT)
Dept: NURSING | Facility: CLINIC | Age: 36
End: 2021-05-06
Attending: FAMILY MEDICINE
Payer: COMMERCIAL

## 2021-05-06 PROCEDURE — 0002A PR COVID VAC PFIZER DIL RECON 30 MCG/0.3 ML IM: CPT

## 2021-05-06 PROCEDURE — 91300 PR COVID VAC PFIZER DIL RECON 30 MCG/0.3 ML IM: CPT

## 2021-05-11 ENCOUNTER — RECORDS - HEALTHEAST (OUTPATIENT)
Dept: ADMINISTRATIVE | Facility: OTHER | Age: 36
End: 2021-05-11

## 2021-05-28 NOTE — PROGRESS NOTES
Walk In Care Note                                                                                 Date of Visit: 5/12/2019     Chief Complaint   Alejandra Moss is a(n) 34 y.o. Black or  female who presents to [facility] with the following complaint(s):  Leg Pain (states pain in Lower Rt leg, states that has been on rest due to IVF protocol )       Assessment and Plan   1. Right calf pain  - US Venous Leg Right; Future      Patient has multiple risk factors for thromboembolic disease (family history of DVT / PE, current use of estradiol, recent immobility). Ultrasound completed to rule out deep vein thrombosis. Reviewed negative result with patient by phone (ultrasound was ordered as a hold and call study at [facility]). Suspect musculoskeletal source of pain. Discussed symptomatic / supportive cares with acetaminophen and alternating application of ice and heat. Discussed indications for reevaluation.     Counseled patient regarding assessment and plan for evaluation and treatment. Questions were answered.     Discussed signs / symptoms that warrant urgent / emergent medical attention.     Follow up as needed.      History of Present Illness   Primary symptom: Calf pain  Onset: 3 day(s) ago  Laterality: Right  Progression: Persisting  Frequency: Intermittent  Description: Aching  Radiation: No  Rating (0-10): 5  Exacerbating factors: Rest  Relieving factors: Activity  Associated bruising: No  Associated swelling: No  Associated erythema: No  Associated fevers: No  Associated chills: No  Additional symptoms: No paresthesias in the right leg. No chest pain or palpitations. Has aching in her mid back. Breathing seems slightly heavy with activity but denies shortness of breath. No cough, sputum production, or hemoptysis.   Home therapies utilized: None  Known injury: No  History of similar pain: No  History of surgery in this area: No  History of deep vein thrombosis: No  History of  pulmonary embolism: No  Risk factors for DVT / PE: Was on bedrest on 5/7/2019-5/8/2019 following IVF transfer. Had a two hour car ride on 5/9/2019 and again today. No other periods of immobility. No injuries. No recent surgeries; underwent laparoscopy on 3/19/2019. Has been on Estrace since 4/22/2019. Has been taking Endometrin since 5/2/2019. Also has a progesterone injection every other day since 5/3/2019. Has been taking a baby aspirin daily. Reports that she had a negative hypercoagulability workup several years ago due to her family history.   Family history of thromboembolic disease: Mother has history of pulmonary embolism. Sister had a deep vein thrombosis following ACL repair.   Tobacco use / exposure: No     Review of Systems   Review of Systems   All other systems reviewed and are negative.       Physical Exam   Vitals:    05/12/19 1158   BP: 132/88   Patient Site: Right Arm   Patient Position: Sitting   Cuff Size: Adult Regular   Pulse: 84   Temp: 98.2  F (36.8  C)   TempSrc: Oral   SpO2: 97%   Weight: (!) 251 lb 6.4 oz (114 kg)     Physical Exam   Constitutional: She is oriented to person, place, and time. She appears well-developed and well-nourished.  Non-toxic appearance. She does not appear ill. No distress.   Cardiovascular: Normal rate, regular rhythm, S1 normal and S2 normal. Exam reveals no gallop and no friction rub.   No murmur heard.  Homans Sign negative bilaterally.    Pulmonary/Chest: Effort normal and breath sounds normal. No stridor. She has no wheezes. She has no rhonchi. She has no rales.   Musculoskeletal:        Right lower leg: She exhibits no tenderness, no swelling, no edema and no deformity.        Left lower leg: She exhibits no tenderness, no swelling, no edema and no deformity.   Neurological: She is alert and oriented to person, place, and time. She has normal strength. No cranial nerve deficit or sensory deficit.   Skin: Skin is warm and dry. Capillary refill takes less than  2 seconds. She is not diaphoretic. No pallor.   Nursing note and vitals reviewed.       Diagnostic Studies   Laboratory:  N/A    Radiology:  EXAM: US VENOUS LEG RIGHT  LOCATION: [facility]  DATE/TIME: 5/12/2019 12:54 PM  INDICATION: Right calf pain.  COMPARISON: None.  TECHNIQUE: Routine exam without and with compression, augmentation, and duplex utilizing 2D gray-scale imaging, Doppler interrogation with color-flow and spectral waveform analysis.  FINDINGS: The common femoral, femoral, popliteal, and segmentally visualized calf veins were evaluated. The opposite CFV was also included in the evaluation.  Right leg veins are negative for deep venous thrombosis. No popliteal cysts.  CONCLUSION:  1.  Right leg veins are negative for DVT.    Electrocardiogram:  N/A     Procedure Note   N/A     Pertinent History   The following portions of the patient's history were reviewed and updated as appropriate: allergies, current medications, past family history, past medical history, past social history, past surgical history and problem list.     Mike Kirby MD  [facility] Department

## 2021-05-30 VITALS — WEIGHT: 242.9 LBS | BODY MASS INDEX: 38.04 KG/M2

## 2021-05-31 VITALS — HEIGHT: 67 IN | BODY MASS INDEX: 39.91 KG/M2 | WEIGHT: 254.3 LBS

## 2021-06-01 VITALS — BODY MASS INDEX: 38.72 KG/M2 | WEIGHT: 247.2 LBS

## 2021-06-01 NOTE — PROGRESS NOTES
Per DR Matias Pt sent to the [facility] at [facility] for evaluation for cerclage placement. Charge RN called and given report. Map and directions given to patient and spouse. Pt instructed to Remain NPO.  Sumaya Mayers  10:33 AM

## 2021-06-01 NOTE — PROGRESS NOTES
"Please see \"Imaging\" tab under \"Chart Review\" for details of today's visit.    Mis Matias        "

## 2021-06-02 VITALS — WEIGHT: 253 LBS | HEIGHT: 67 IN | BODY MASS INDEX: 39.71 KG/M2

## 2021-06-02 VITALS — WEIGHT: 253.9 LBS | BODY MASS INDEX: 39.77 KG/M2

## 2021-06-02 VITALS — BODY MASS INDEX: 39.25 KG/M2 | WEIGHT: 250.1 LBS | HEIGHT: 67 IN

## 2021-06-02 VITALS — WEIGHT: 251.4 LBS | BODY MASS INDEX: 39.37 KG/M2

## 2021-06-05 NOTE — PROGRESS NOTES
"[facility] Pediatrics Lactation Visit     Assessment:     1.  difficulty in feeding at breast         Anne Marie has had excellent growth and is doing well with mostly exclusive breastfeeding. She typically takes 2-3 bottles per day per parents' preference. She was able to transfer 1.3 oz at the breast today which is less than I would expect for a 4 week infant, but she appeared sleepy during this feeding and her transfer is likely much better at other feedings.     She was able to latch to the breast comfortably and mom did not have any pain. She did not cough or sputter at the breast and seemed to keep up with the flow of milk without a problem.      Mom is able to pump an additional 30 - 40 oz per day in addition to nursing Anne Marie. This is approximately double what she needs. Discussed pros and cons of reducing her milk supply /gradually weaning off of pumping while continuing to nurse Anne Marie. Discussed options of donating excess milk. Discussed strategies to avoid clogged ducts by gradually spreading out pump sessions and what to do if a clog occurs. Mom plans to consider her options and follow up as needed.           Plan:      Continue to breastfeed on demand, at least 8-12 times a day.      Alternate which breast you start on. Latch baby deeply by making a \"breast sandwich,\" and aim your nipple for the roof of the mouth. If baby's lips are rolled inward, flip the top lip out with your finger, and then apply gentle downward pressure to the chin to help the lips flange out like \"fish lips.\" If you have pain that lasts beyond the initial latch-on, always restart.      Supplementation plan: No need to supplement, but OK to continue offering 1-3 bottles per day so that you can rest.       Recommended to pump: To gradually reduce milk supply, work on gradually spreading out the time between pump sessions. You may need to pump for slightly less time at first and then gradually spread out the pump sessions. Go slow " "with this.      Continue to monitor output, expect at least 6 wet diapers per day.   Recommended Vitamin D 400 IU daily.         Follow up as needed for ongoing lactation concerns               For donating milk:      [facility]: For more information, email us at [email or web address] or call [phone].      [facility]: [email or web address]        Clogged ducts can be painful and if not relieved can become infected, causing mastitis.      Strategies to relieve a clogged duct:      -Massage over the area, ideally while pumping or nursing. Alternate between massage at the clogged area closer to your nipple to break the clog up, and then massage from \"behind\" the clog towards your nipple to try to release the clog.  -Nurse frequently on the affected side, and move baby around into different positions   -Pump frequently on the affected side or hand express  -A warm shower or bath - epsom salts in the bath can help. Do hand expression/massage while bathing  -Vibration over the area, like with an electric toothbrush  -A haakaa pump - either on its own while nursing or pumping on the other side, or filled with warm water and epsom salts  -\"Dangle pump\" - let gravity help you release the clog  -\"Breast lift\" - this is where you lay on your back for up to 40 minutes (watch a TV show!) And gently lift your breast into the air, rotating where you hold it. This is similar to elevating a sprained ankle and will help reduce the swelling   -Warmth (with a heat pack, rice sock etc) BEFORE and WHILE nursing or pumping, and ice AFTER nursing or pumping. Ice will help cut down on the inflammation.   -Take ibuprofen to help reduce inflammation   -Sunflower lecithin can help treat a clogged duct while you're experiencing it, or reduce the likelihood of recurrent clogged ducts. The recommended dose for recurrent plugged ducts is 8744-9605 mg lecithin per day, or one 1200 mg capsule 3-4 times per day. Once " "the clog has been relieved and things are going well you can gradually reduce the daily dose as tolerated.   -Avoid tight, restrictive clothing - sometimes spaghetti straps can cause a clog to develop.   -Look for a \"bleb\" on the nipple (these are often painful and look like white heads) soaking the area and gently exfoliating the area with a washcloth can open the bleb.   -A significant other can attempt to suck out the clog as they are more likely to have success than a baby or a pump (greater suction power). This is not for everyone but often a successful option.         A clogged area may continue to be tender for a few days even after the clog has been relieved.      Call your provider if you develop signs of mastitis - chills, body aches, fever accompanied by a clogged duct that is firm, warm and tender.                SUBJECTIVE:      Anne Marie is here today with mom, Alejandra, and dad Jag, for lactation support. She is a 4 wk.o. female born at Gestational Age: 35w3d now 32 days.    She is doing well. She has gained 15.5 oz since last visit 14 days ago. She has gained approximately 1.1 oz per day over the past 14 days and is now 34% from birth weight.   .     Baby is nursing 6-8 times per day for about 5-20 minutes per session. She just nurses on one side at a time.   Mother reports hearing audible swallows.   Baby feeds about 10 times in 24 hours.   Baby is supplemented with expressed breast milk, about 2-3 oz 2-3 times per day so that mom can rest.   Mom is also pumping about 5-7 per day and gets about 4-10 oz per pumping session. Mom produces 30 - 40 oz in 24 hours in addition to nursing. Mom uses the hospital grade Medela pump.   Number of wet diapers in 24 hours: 6  Number of stools in 24 hours: 1  Color and consistency of stools: yellow, soft  Mom noticed her breasts grew larger and areolas darkened during pregnancy and she noticed primary engorgement when her milk came in on day 3.        Breastfeeding Goals: " Mom would like to nurse for a year - as long as it makes sense for mom and baby     Previous Breastfeeding Experience: First baby  Breast-surgery: No  Maternal medications: PNV, Vitamin D 5,000 IU/day, calcium supplement     Hospital course:  NICU stay at [facility] - 1 bloody stool but no signs of infection during stay - mostly worked on feeding and growing. Otherwise she did well.            Results for orders placed or performed in visit on 20   Bilirubin,  Panel   Result Value Ref Range     Bilirubin, Total 11.8 (H) 0.0 - 6.0 mg/dL     Bilirubin, Direct 0.4 <=0.5 mg/dL     Bilirubin, Indirect 11.4 (H) 0.0 - 6.0 mg/dL     Age in Hours 278 hours         Current Outpatient Medications:      cholecalciferol, vitamin D3, 10 mcg/mL (400 unit/mL) Drop drops, Take by mouth., Disp: , Rfl:        Past Medical History:   Diagnosis Date      hyperbilirubinemia 2020     [place] product of in vitro fertilization (IVF) pregnancy       Premature infant of 35 weeks gestation 2019     9 day NICU stay      No past surgical history on file.        Family History   Problem Relation Age of Onset     Diabetes Mother 32         gestational     Diabetes Maternal Grandmother 40     Diabetes Maternal Grandfather       Heart disease Maternal Grandfather       Hyperlipidemia Maternal Grandfather       Hypertension Maternal Grandfather       Alcoholism Maternal Grandfather       Hypertension Paternal Grandmother       Hyperlipidemia Paternal Grandmother       Hyperlipidemia Paternal Grandfather       Heart disease Paternal Grandfather              Primary care provider: Sharon Brown MD     OBJECTIVE:     Mother:   Nipples are everted, the areola is compressible, the breast is soft and full.      Sore nipples: No cracks or bleeding.    Maternal depression screening: Doing well  EPDS: Referral to maternal PCP not made     Infant:      Age today: 32 days         Vitals:     20 1505   Pulse: 160             Weight:       Wt Readings from Last 3 Encounters:   01/23/20 8 lb 8.5 oz (3.87 kg) (26 %, Z= -0.66)*   01/23/20 8 lb 5.5 oz (3.785 kg) (21 %, Z= -0.81)*   01/09/20 7 lb 6 oz (3.345 kg) (19 %, Z= -0.90)*      * Growth percentiles are based on WHO (Girls, 0-2 years) data.         Birthweight:  6 lb 5.6 oz (2.88 kg).   Today's weight:    Vitals       Vitals:     01/23/20 1505   Weight: 8 lb 8.5 oz (3.87 kg)      . This is 34% from birth weight.      Test weights:     LEFT side: 1.3 oz  RIGHT side: refused     TOTAL transfer:  1.3 oz        Feeding assessment:      Digital suck assessment:  Infant draws consultant's finger into mouth, palate intact, tongue over gums, normal frenulum.      Baby can hold suction with tongue while at the breast.      Alignment: Baby's head was aligned with its trunk. Baby did face mother. Baby was in cross cradle position today.      Areolar Grasp: Baby was able to open mouth wide. Baby's lips were not pursed. Baby's lips did flange outward. Tongue was visible just barely over bottom lip. Baby had complete seal.      Areolar Compression: Baby made rhythmic motion. There were no clicking or smacking sounds. There was no severe nipple discomfort.  Nipples appeared round after feeding.     Audible swallowing: Baby made quiet sounds of swallowing: There was an increase in frequency after milk ejection reflex. The milk ejection reflex is appropriate and milk supply appears abundant.      PHYSICAL EXAM     Gen: Alert, no acute distress.   Head: Anterior fontanelle flat and soft.   Mouth:Lips pink. Oral mucosa moist. Tongue midline (good lateralization, movement, and lift; able to extend pass lower gumline).  Palate intact. Coordinated suck.  Lungs: Clear to auscultation bilaterally.   Cardiac: Regular regular rate and rhythm, S1S2, no murmurs.  Abdomen: Soft, nontender, bowel sounds present, no hepatosplenomegaly or mass palpable. Umbilicus dry with no erythema or drainage.   :  Yonatan stage 1 female genitalia  Skin: Intact, dry, appropriate coloring for ethnicity, no jaundice.   Neuro: Appropriate muscle tone.     The visit lasted a total of 60 minutes that I spent face to face with the patient. Of that time, 60 minutes were spent on lactation. Over 50% of the time was spent counseling and educating the patient about feeding difficulties and lactation concerns.      Completed by:   KRZYSZTOF San, IBCLC, [facility], Pediatrics.  1/23/2020 9:41 AM

## 2021-06-10 ENCOUNTER — OFFICE VISIT - HEALTHEAST (OUTPATIENT)
Dept: FAMILY MEDICINE | Facility: CLINIC | Age: 36
End: 2021-06-10

## 2021-06-10 DIAGNOSIS — Z86.32 HISTORY OF GESTATIONAL DIABETES: ICD-10-CM

## 2021-06-10 DIAGNOSIS — Z13.220 SCREENING FOR LIPID DISORDERS: ICD-10-CM

## 2021-06-10 DIAGNOSIS — E55.9 VITAMIN D DEFICIENCY: ICD-10-CM

## 2021-06-10 DIAGNOSIS — Z00.00 ROUTINE HEALTH MAINTENANCE: ICD-10-CM

## 2021-06-10 DIAGNOSIS — E66.01 MORBID OBESITY (H): ICD-10-CM

## 2021-06-10 LAB
ANION GAP SERPL CALCULATED.3IONS-SCNC: 11 MMOL/L (ref 5–18)
BUN SERPL-MCNC: 13 MG/DL (ref 8–22)
CALCIUM SERPL-MCNC: 9.2 MG/DL (ref 8.5–10.5)
CHLORIDE BLD-SCNC: 104 MMOL/L (ref 98–107)
CHOLEST SERPL-MCNC: 226 MG/DL
CO2 SERPL-SCNC: 24 MMOL/L (ref 22–31)
CREAT SERPL-MCNC: 0.7 MG/DL (ref 0.6–1.1)
FASTING STATUS PATIENT QL REPORTED: YES
GFR SERPL CREATININE-BSD FRML MDRD: >60 ML/MIN/1.73M2
GLUCOSE BLD-MCNC: 96 MG/DL (ref 70–125)
HBA1C MFR BLD: 6 %
HDLC SERPL-MCNC: 40 MG/DL
LDLC SERPL CALC-MCNC: 139 MG/DL
POTASSIUM BLD-SCNC: 4.6 MMOL/L (ref 3.5–5)
SODIUM SERPL-SCNC: 139 MMOL/L (ref 136–145)
TRIGL SERPL-MCNC: 237 MG/DL

## 2021-06-10 ASSESSMENT — MIFFLIN-ST. JEOR: SCORE: 1947.81

## 2021-06-11 LAB — 25(OH)D3 SERPL-MCNC: 31.3 NG/ML (ref 30–80)

## 2021-06-14 NOTE — PROGRESS NOTES
"Assessment/Plan:        Following plan was discussed at today's visit.    1. Health care maintenance  PAP was completed in 2015, next due 2018.  Lipid panel completed 5/2017, will add on additional annual physical labs.   Continue with prenatal multivitamin and tracking menses, continue with healthy lifestyle.   We discussed healthy lifestyle, nutrition, cardiovascular risk reduction, self care, safety, self breast exams, sunscreen, and seatbelt screening.  Recommended annual physical exam or sooner for any acute concerns.   Patient agreed and appeared pleased with plan      - Vitamin D, Total (25-Hydroxy)  - HM2(CBC w/o Differential)  - Comprehensive Metabolic Panel  - Thyroid Cascade               Subjective:    Patient ID: Alejandra Xie is a 32 y.o. female.    HPI    Alejandra Xie is a 32 y.o. female who is here for a health maintenance visit.  Is  and enjoys swimming two times per week.   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  She has been trying to get pregnant sine 6/2017. Using ovulation predictor kits and her menses is regular.   Taking prenatal multivitamins.   Step daughter age 5    No acute concerns at today's visit.     Following information reviewed and updated with patient at today's visit.    No Known Allergies  No current outpatient prescriptions on file.     No current facility-administered medications for this visit.      No past medical history on file.  Past Surgical History:   Procedure Laterality Date     ABDOMINAL SURGERY      premature, \"intestines not fully developed\"  age 4 y/o     TONSILLECTOMY       Social History     Social History     Marital status: Patient Declined     Spouse name: Mane     Number of children: 1     Years of education: N/A     Occupational History      - [employer]      Doctorate, In Counseling.      Social History Main Topics     Smoking status: Never Smoker     Smokeless tobacco: Never Used     " "Alcohol use 1.0 oz/week     2 Standard drinks or equivalent per week      Comment: 1-2drinks per week     Drug use: No     Sexual activity: Yes     Partners: Male     Other Topics Concern     None     Social History Narrative     Family History   Problem Relation Age of Onset     Diabetes Mother      Depression Mother      Lupus Mother      Clotting disorder Mother      Heart disease Father      Hypertension Father      Clotting disorder Sister      Lupus Sister      Objective:     Physical Exam   /86 (Patient Site: Left Arm, Patient Position: Sitting, Cuff Size: Adult Large)  Pulse 64  Ht 5' 7\" (1.702 m)  Wt (!) 254 lb 4.8 oz (115.3 kg)  LMP 11/10/2017 (Exact Date)  BMI 39.83 kg/m2    Review of Systems  Constitutional: Negative.   HENT: Negative.   Eyes: Negative.   Respiratory: Negative.   Cardiovascular: Negative.   Gastrointestinal: Negative.   Endocrine: Negative.   Genitourinary: Negative.   Musculoskeletal: Negative.   Skin: Negative.   Allergic/Immunologic: Negative.   Neurological: Negative.   Hematological: Negative.   Psychiatric/Behavioral: Negative.         Objective:    Physical Exam   Constitutional: Alert and oriented x3, well nourished without any acute distress  HENT:   Right Ear: External ear normal.   Left Ear: External ear normal.   Nose: Nose normal.   Mouth/Throat: Oropharynx is clear and moist.   Eyes: Conjunctivae and EOM are normal. Pupils are equal, round, and reactive to light. Right eye exhibits no discharge. Left eye exhibits no discharge.   Neck: No thyromegaly present.   Lymphadenopathy: Without palpable lymphadenopathy  Cardiovascular: Normal S1 and S2. Regular rate, rhythm and no murmur, rubs or gallops. Palpable distal pulses bilaterally.  Pulmonary/Chest: Normal effort. Lungs clear to auscultation in all lobes. Without wheezing, rhonchi or crackles.    Abdominal: Soft, non tenderness. There is no rebound or guarding. Bowel sounds x4. Without organomegaly. "   Musculoskeletal: 5/5 strength  And full ROM in all extremities. No joint swelling or deformity  Neurological: Cranial nerves intact, without deficit. Without numbness, tingling or paresthesia. Normal reflexes  Skin: Dry and intact; without rashes or lesions.   Psychiatric: Normal mood and affect.   Breast: No chest deformity, asymmetry. Normal contours. Without nodules, masses, tenderness, or axillary adenopathy. No nipple discharge or dimpling noted.

## 2021-06-17 NOTE — PATIENT INSTRUCTIONS - HE
Patient Instructions by America Mujica NP at 1/4/2019  3:00 PM     Author: America Mujica NP Service: -- Author Type: Nurse Practitioner    Filed: 1/4/2019  3:15 PM Encounter Date: 1/4/2019 Status: Signed    : America Mujica NP (Nurse Practitioner)       Patient Education     Prevention Guidelines, Women Ages 18 to 39  Screening tests and vaccines are an important part of managing your health. A screening test is done to find possible disorders or diseases in people who don't have any symptoms. The goal is to find a disease early so lifestyle changes can be made and you can be watched more closely to reduce the risk of disease, or to detect it early enough to treat it most effectively. Screening tests are not considered diagnostic, but are used to determine if more testing is needed. Health counseling is essential, too. Below are guidelines for these, for women ages 18 to 39. Talk with your healthcare provider to make sure youre up-to-date on what you need.  Screening Who needs it How often   Alcohol misuse All women in this age group At routine exams   Blood pressure All women in this age group Yearly checkup if your blood pressure is normal  Normal blood pressure is less than 120/80 mm Hg  If your blood pressure reading is higher than normal, follow the advice of your healthcare provider   Breast cancer All women in this age group should talk with their healthcare providers about the need for clinical breast exams (CBE)1 Clinical breast exam every 3 years1   Cervical cancer Women ages 21 and older Women between ages 21 and 29 should have a Pap test every 3 years; women between ages 30 and 65 are advised to have a Pap test plus an HPV test every 5 years   Chlamydia Sexually active women ages 24 and younger, and women at increased risk for infection Every 3 years if you're at risk or have symptoms   Depression All women in this age group At routine exams   Type 2 diabates, prediabetes All women with  no symptoms who are overweight or obese and have 1 or more other risk factors for diabetes At least every 3 years. Also, testing for diabetes during pregnancy after the 24th week.    Type 2 diabetes, prediabetes All women diagnosed with gestational diabetes Lifelong testing every 3 years   Type 2 diabetes All women with prediabetes Every year   Gonorrhea Sexually active women at increased risk for infection At routine exams   Hepatitis C Anyone at increased risk At routine exams   HIV All women should be tested at least once for HIV between the ages of 13 and 64 At routine exams. Those with risk factors for HIV should be tested at least annually.    Obesity All women in this age group At routine exams   Syphilis Women at increased risk for infection should talk with their healthcare provider At routine exams   Tuberculosis Women at increased risk for infection should talk with their healthcare provider Ask your healthcare provider   Vision All women in this age group At least 1 complete exam in your 20s, and 2 in your 30s   Vaccine2 Who needs it How often   Chickenpox (varicella) All women in this age group who have no record of this infection or vaccine 2 doses; the second dose should be given 4 to 8 weeks after the first dose   Hepatitis A Women at increased risk for infection should talk with their healthcare provider 2 doses given at least 6 months apart   Hepatitis B Women at increased risk for infection should talk with their healthcare provider 3 doses over 6 months; second dose should be given 1 month after the first dose; the third dose should be given at least 2 months after the second dose and at least 4 months after the first dose   Haemophilus influenzae Type B (HIB) Women at increased risk for infection should talk with their healthcare provider 1 to 3 doses   Human papillomavirus (HPV) All women in this age group up to age 26 3 doses; the second dose should be given 1 to 2 months after the first dose  and the third dose given 6 months after the first dose   Influenza (flu) All women in this age group Once a year   Measles, mumps, rubella (MMR) All women in this age group who have no record of these infections or vaccines 1 or 2 doses   Meningococcal Women at increased risk for infection should talk with their healthcare provider 1 or more doses   Pneumococcal conjugate vaccine (PCV13) and pneumococcal polysaccharide vaccine (PPSV23) Women at increased risk for infection should talk with their healthcare provider PCV13: 1 dose ages 19 to 65 (protects against 13 types of pneumococcal bacteria)  PPSV23: 1 to 2 doses through age 64, or 1 dose at 65 or older (protects against 23 types of pneumococcal bacteria)      Tetanus/diphtheria/pertussis (Td/Tdap) booster All women in this age group Td every 10 years, or a one-time dose of Tdap instead of a Td booster after age 18, then Td every 10 years   Counseling Who needs it How often   BRCA gene mutation testing for breast and ovarian cancer susceptibility Women with increased risk for having gene mutation When your risk is known   Breast cancer and chemoprevention Women at high risk for breast cancer When your risk is known   Diet and exercise Women who are overweight or obese When diagnosed, and then at routine exams   Domestic violence Women at the age in which they are able to have children At routine exams   Sexually transmitted infection prevention Women who are sexually active At routine exams   Skin cancer Prevention of skin cancer in fair-skinned adults At routine exams   Use of tobacco and the health effects it can cause All women in this age group Every visit   1According to the ACS, women ages 20 to 39 years should have a clinical breast exam (CBE) as part of their routine health exam every 3 years. Breast self-exams are an option for women starting in their 20s. But the USPSTF does not recommend CBE.  2Those who are 18 years old and not up-to-date on their  childhood vaccines should get all appropriate catch-up vaccines recommended by the CDC.  Date Last Reviewed: 10/1/2017    1353-1386 [employer]. [address] [place] [address], [place] [address]. All rights reserved. This information is not intended as a substitute for professional medical care. Always follow your healthcare professional's instructions.

## 2021-06-20 NOTE — LETTER
Letter by Mariam Troncoso at      Author: Mariam Troncoso Service: -- Author Type: --    Filed:  Encounter Date: 1/7/2020 Status: Signed         January 7, 2020       Alejandra CINTRON Nurysshonnakhushboo  [address] [place] [address]    Dear Alejandra Moss:    We are pleased to provide you with secure, online access to medical information for you and your family within [facility]. Per your request, we have expanded your account to allow access to the records of the following family members:              Anne Marie Moss (privilege ends on 12/21/2031.)     How Do I Log In?  1. In your Internet browser, go to [email or web address]  2. Log into [employer] using your [employer] Username and Password.  3. Click Sign In.        How Do I Access a Family Member's Account?  4. Select the account you want to access by clicking the [place] with the appropriate patient's name at the top of your screen.   5. You will see a disclaimer page letting you know that you will be viewing a family member's record. Review the disclaimer and then click Accept Proxy Access Disclaimer to proceed.  6. Once you switch to viewing a family member's record, you can navigate to [employer] pages the same way you would for yourself. You can return to your own account by clicking the [place] at the top of the screen with your name on it.    7. To customize the colors and names of the linked accounts, you can select Personalize from the Profile dropdown menu at the top of the screen, then click the Edit button to make changes.     Additional Information  If you have questions, visit [email or web address], e-mail [email or web address] or call [phone] to talk to our [employer] staff. Remember, [employer] is NOT to be used for urgent needs. For medical emergencies, dial 911.

## 2021-06-22 NOTE — PROGRESS NOTES
FEMALE PREVENTATIVE EXAM    Assessment and Plan:       1. Routine health maintenance  Healthy female exam    2. Screening for diabetes mellitus  - Basic Metabolic Panel; Future    3. Screening for lipid disorders  - Lipid [place] FASTING; Future    4. Screening for malignant neoplasm of cervix  - Gynecologic Cytology (PAP Smear)     Next follow up:  Return in about 1 year (around 1/4/2020) for Physical.    Immunization Review  Adult Imm Review: No immunizations due today    I discussed the following with the patient:   Adult Healthy Living: Importance of regular exercise  Healthy nutrition    I have had an Advance Directives discussion with the patient.    Subjective:   Chief Complaint: Alejandra Moss is an 33 y.o. female here for a preventative health visit.     HPI:  Patient is  with 1 stepdaughter.  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  Patient is currently undergoing fertility treatments, and is anticipating an embryo transfer next month.  Patient has blockages of bilateral fallopian tubes.  She is currently only on oral contraceptives.  Periods are regular and predictable.  Due for pap.  No history of abnormals.  No abnormal vaginal discharge or bleeding.     Patient is exercising regularly.      No concerns today.    Healthy Habits  Are you taking a daily aspirin? No  Do you typically exercising at least 40 min, 3-4 times per week?  Yes  Do you usually eat at least 4 servings of fruit and vegetables a day, include whole grains and fiber and avoid regularly eating high fat foods? Yes  Have you had an eye exam in the past two years? Yes  Do you see a dentist twice per year? Yes  Do you have any concerns regarding sleep? No    Safety Screen  If you own firearms, are they secured in a locked gun cabinet or with trigger locks? Yes  Do you feel you are safe where you are living?: Yes (1/4/2019  2:42 PM)  Do you feel you are safe in your relationship(s)?: Yes (1/4/2019  2:42 PM)      Review of Systems:  " Please see above.  The rest of the review of systems are negative for all systems.     Pap History:   Yes - updated in Problem List and Health Maintenance accordingly  Cancer Screening       Status Date      PAP SMEAR Overdue 10/20/2018      Done 10/20/2015      Patient has more history with this topic...          Patient Care Team:  America Mujica NP as PCP - General (Family Medicine)        History     Reviewed By Date/Time Sections Reviewed    America Mujica NP 1/4/2019  3:01 PM Tobacco    America Mujica NP 1/4/2019  2:56 PM Tobacco            Objective:   Vital Signs:   Visit Vitals  /62   Pulse 72   Temp 99.1  F (37.3  C)   Ht 5' 7\" (1.702 m)   Wt (!) 250 lb 1.6 oz (113.4 kg)   BMI 39.17 kg/m           PHYSICAL EXAM  General Appearance: Alert, cooperative, no distress, appears stated age  Head: Normocephalic, without obvious abnormality, atraumatic  Eyes: PERRL, conjunctiva/corneas clear, EOM's intact  Ears: Normal TM's and external ear canals, both ears  Nose: Nares normal, septum midline  Throat: Lips, mucosa, and tongue normal; teeth and gums normal  Neck: Supple, symmetrical, trachea midline, no adenopathy;  thyroid: not enlarged, symmetric, no tenderness/mass/nodules  Lungs: Clear to auscultation bilaterally, respirations unlabored  Breasts: No breast masses, tenderness, asymmetry, or nipple discharge.  Heart: Regular rate and rhythm, S1 and S2 normal, no murmur, rub, or gallop  Abdomen: Soft, non-tender, bowel sounds active all four quadrants,  no masses, no organomegaly  Pelvic: Normally developed genitalia with no external lesions or eruptions. Vagina and cervix show no lesions, inflammation, discharge or tenderness. No cystocele, No rectocele.  No adnexal mass or tenderness.  Extremities: Extremities normal, atraumatic, no cyanosis or edema  Skin: Skin color, texture, turgor normal, no rashes or lesions  Lymph nodes: Cervical, supraclavicular, and axillary nodes normal  Neurologic: " Normal   Psychologic: appropriate affective, answers all of my questions appropriately. No hallucinations, delusion, or suicidal ideations.    CHIDI PrasadC

## 2021-06-24 NOTE — PROGRESS NOTES
Preoperative Exam    Scheduled Procedure: LAPAROSCOPIC BILATERAL TUBAL LIGATION VIA SALPINGECTOMY  Surgery Date:  3/19/19  Surgery Location: [facility], fax [phone]    Surgeon:  Yesika Light MD    Assessment/Plan:     1. Encounter for preoperative examination for general surgical procedure  Hemoglobin stable.   - Hemoglobin    2. Female infertility    Surgical Procedure Risk: Low (reported cardiac risk generally < 1%)  Have you had prior anesthesia?: Yes  Have you or any family members had a previous anesthesia reaction:  No  Do you or any family members have a history of a clotting or bleeding disorder?: Yes: family history of blood clots in her mother and sister  Cardiac Risk Assessment: no increased risk for major cardiac complications    Patient approved for surgery with general or local anesthesia.    Please Note:  None    Functional Status: Independent  Patient plans to recover at home with family.     Subjective:      Alejandra Moss is a 34 y.o. female who presents for a preoperative consultation.  Patient is scheduled for a bilateral salpingectomy.  She is currently undergoing fertility treatments, as she has been unable to get pregnancy naturally.  Patient had a significant abdominal surgery as a child, which is thought to be contributing to her fertility issues.  She was scheduled for an embryo transfer, but as found to have significant fluid in her fallopian tubes.  They are planning on an embryo transfer after she has recovered from surgery.    Pertinent history of clotting disorder and DVT in her mother and sister.  Patient tells me she has been checked for clotting disorders, and the workup was negative.  No personal history of blood clots.      All other systems reviewed and are negative, other than those listed in the HPI.    Pertinent History  Do you have difficulty breathing or chest pain after walking up a flight of stairs: No  History of obstructive sleep apnea:  "No  Steroid use in the last 6 months: No  Frequent Aspirin/NSAID use: No  Prior Blood Transfusion: No  Prior Blood Transfusion Reaction: N/A  If for some reason prior to, during or after the procedure, if it is medically indicated, would you be willing to have a blood transfusion?:  There is no transfusion refusal.    Current Outpatient Medications   Medication Sig Dispense Refill     prenatal vitamin iron-folic acid 27mg-0.8mg (PRENATAL S) 27 mg iron- 800 mcg Tab tablet Take 1 tablet by mouth daily.       No current facility-administered medications for this visit.         No Known Allergies    Patient Active Problem List   Diagnosis     Cervical cancer screening     Family history of DVT       No past medical history on file.    Past Surgical History:   Procedure Laterality Date     ABDOMINAL SURGERY      premature, \"intestines not fully developed\"  age 4 y/o     TONSILLECTOMY         Social History     Socioeconomic History     Marital status:      Spouse name: Mane     Number of children: 1     Years of education: Not on file     Highest education level: Not on file   Occupational History     Occupation:  - [employer]     Comment: Doctorate, In Counseling.    Social Needs     Financial resource strain: Not on file     Food insecurity:     Worry: Not on file     Inability: Not on file     Transportation needs:     Medical: Not on file     Non-medical: Not on file   Tobacco Use     Smoking status: Never Smoker     Smokeless tobacco: Never Used   Substance and Sexual Activity     Alcohol use: Yes     Alcohol/week: 1.0 oz     Types: 2 Standard drinks or equivalent per week     Comment: 1-2drinks per week     Drug use: No     Sexual activity: Yes     Partners: Male   Lifestyle     Physical activity:     Days per week: Not on file     Minutes per session: Not on file     Stress: Not on file   Relationships     Social connections:     Talks on phone: Not on file     Gets together: Not on file     " Attends Jainism service: Not on file     Active member of club or organization: Not on file     Attends meetings of clubs or organizations: Not on file     Relationship status: Not on file     Intimate partner violence:     Fear of current or ex partner: Not on file     Emotionally abused: Not on file     Physically abused: Not on file     Forced sexual activity: Not on file   Other Topics Concern     Not on file   Social History Narrative     Not on file       Patient Care Team:  America Mujica NP as PCP - General (Family Medicine)          Objective:     Vitals:    03/01/19 0739   BP: 138/80   Pulse: 64   Weight: (!) 253 lb 14.4 oz (115.2 kg)   LMP: 01/20/2019         Physical Exam:  General Appearance: Alert, cooperative, no distress, appears stated age  Head: Normocephalic, without obvious abnormality, atraumatic  Eyes: PERRL, conjunctiva/corneas clear, EOM's intact  Ears: Normal TM's and external ear canals, both ears  Nose: Nares normal, septum midline,mucosa normal, no drainage  Throat: Lips, mucosa, and tongue normal; teeth and gums normal  Neck: Supple, symmetrical, trachea midline, no adenopathy;  thyroid: not enlarged, symmetric, no tenderness/mass/nodules; no carotid bruit or JVD  Back: no CVA tenderness  Lungs: Clear to auscultation bilaterally, respirations unlabored  Heart: Regular rate and rhythm, S1 and S2 normal, no murmur, rub, or gallop  Abdomen: Soft, non-tender, bowel sounds active all four quadrants,  no masses, no organomegaly  Extremities: Extremities normal, atraumatic, no cyanosis or edema  Skin: Skin color, texture, turgor normal, no rashes or lesions  Lymph nodes: Cervical, supraclavicular nodes normal  Neurologic: Normal   Psychologic: appropriate affective, answers all of my questions appropriately. No hallucinations, delusion, or suicidal ideations.      Patient Instructions     Hold all supplements, aspirin and NSAIDs for 7 days prior to surgery.  Follow your surgeon's direction  on when to stop eating and drinking prior to surgery.  Your surgeon will be managing your pain after your surgery.    Remove all jewelry and metal piercings before your surgery.   Remove nail polish from fingers before surgery.      Labs:  Recent Results (from the past 24 hour(s))   Hemoglobin    Collection Time: 03/01/19  8:05 AM   Result Value Ref Range    Hemoglobin 13.8 12.0 - 16.0 g/dL       Immunization History   Administered Date(s) Administered     Hep A, Adult IM (19yr & older) 05/19/2017     Hep A, historic 07/01/2014     Influenza, inj, historic,unspecified 10/06/2015, 11/16/2016, 10/26/2017, 10/15/2018     Tdap 10/28/2010     Typhoid, Inj, Inactive 07/01/2014           Electronically signed by America Mujica NP 03/01/19 7:37 AM

## 2021-06-25 NOTE — ANESTHESIA PREPROCEDURE EVALUATION
Anesthesia Evaluation      No history of anesthetic complications     Airway   Mallampati: I  Neck ROM: full   Pulmonary - negative ROS and normal exam                          Cardiovascular - negative ROS and normal exam   Neuro/Psych - negative ROS     Endo/Other    (+) obesity,      GI/Hepatic/Renal - negative ROS           Dental - normal exam                        Anesthesia Plan  Planned anesthetic: general endotracheal    ASA 2     Anesthetic plan and risks discussed with: patient    Post-op plan: routine recovery

## 2021-06-25 NOTE — ANESTHESIA POSTPROCEDURE EVALUATION
Patient: Alejandra Moss  LAPAROSCOPY WITH LYSIS OF ADHESIONS  Anesthesia type: general    Patient location: Phase II Recovery  Last vitals:   Vitals:    03/19/19 1500   BP: 141/77   Pulse: 93   Resp: 15   Temp: 36.4  C (97.5  F)   SpO2: 93%     Post vital signs: stable  Level of consciousness: awake and responds to simple questions  Post-anesthesia pain: pain controlled  Post-anesthesia nausea and vomiting: no  Pulmonary: unassisted, return to baseline  Cardiovascular: stable and blood pressure at baseline  Hydration: adequate  Anesthetic events: no    QCDR Measures:  ASA# 11 - Nancy-op Cardiac Arrest: ASA11B - Patient did NOT experience unanticipated cardiac arrest  ASA# 12 - Nancy-op Mortality Rate: ASA12B - Patient did NOT die  ASA# 13 - PACU Re-Intubation Rate: ASA13B - Patient did NOT require a new airway mgmt  ASA# 10 - Composite Anes Safety: ASA10A - No serious adverse event     Additional Notes:  Recovery as anticipated from general anesthetic.

## 2021-06-25 NOTE — ANESTHESIA CARE TRANSFER NOTE
Last vitals:   Vitals:    03/19/19 0918   BP: 136/82   Pulse: 73   Resp: 18   Temp: 37.2  C (99  F)   SpO2: 98%     Patient's level of consciousness is awake and drowsy  Spontaneous respirations: yes  Maintains airway independently: yes  Dentition unchanged: yes  Oropharynx: oropharynx clear of all foreign objects    QCDR Measures:  ASA# 20 - Surgical Safety Checklist: WHO surgical safety checklist completed prior to induction    PQRS# 430 - Adult PONV Prevention: 4558F - Pt received => 2 anti-emetic agents (different classes) preop & intraop  ASA# 8 - Peds PONV Prevention: NA - Not pediatric patient, not GA or 2 or more risk factors NOT present  PQRS# 424 - Nancy-op Temp Management: 4559F - At least one body temp DOCUMENTED => 35.5C or 95.9F within required timeframe  PQRS# 426 - PACU Transfer Protocol: - Transfer of care checklist used  ASA# 14 - Acute Post-op Pain: ASA14B - Patient did NOT experience pain >= 7 out of 10

## 2021-06-26 NOTE — PROGRESS NOTES
FEMALE PREVENTATIVE EXAM    Assessment and Plan:     Patient has been advised of split billing requirements and indicates understanding: Yes    1. Routine health maintenance  Healthy female exam    2. Vitamin D deficiency  Patient is on a supplement  - Vitamin D, Total (25-Hydroxy)    3. Screening for lipid disorders  - Lipid [place] FASTING    4. Morbid obesity (H)  Encouraged continued exercise.    5. History of gestational diabetes  - Basic Metabolic Panel  - Glycosylated Hemoglobin A1c     Next follow up:  Return in about 1 year (around 6/10/2022) for Physical.    Immunization Review  Adult Imm Review: No immunizations due today  BMI: 40.20    I discussed the following with the patient:   Adult Healthy Living: Importance of regular exercise  Healthy nutrition      Subjective:   Chief Complaint: Alejandra Moss is an 36 y.o. female here for a preventative health visit.    Patient has been advised of split billing requirements and indicates understanding: Yes    HPI:  Patient is  with 2 children.  Has an 18 month old daughter, Anne Marie who she conceived with fertility treatments.    History of insulin controlled GDM during her pregnancy.    Recently had a pap and pelvic exam with her gynecologist, Dr. Light.      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    Denies any concerns today.     Healthy Habits  Are you taking a daily aspirin? No  Do you typically exercising at least 40 min, 3-4 times per week?  Yes  Do you usually eat at least 4 servings of fruit and vegetables a day, include whole grains and fiber and avoid regularly eating high fat foods? Yes  Have you had an eye exam in the past two years? NO  Do you see a dentist twice per year? Yes  Do you have any concerns regarding sleep? No    Safety Screen  If you own firearms, are they secured in a locked gun cabinet or with trigger locks? The patient does not own any firearms  Do you feel you are safe where you are living?: Yes (6/10/2021  7:31 AM)  Do you feel  "you are safe in your relationship(s)?: Yes (6/10/2021  7:31 AM)      Review of Systems:  Please see above.  The rest of the review of systems are negative for all systems.     Pap History:   Yes - updated in Problem List and Health Maintenance accordingly  Cancer Screening       Status Date      PAP SMEAR Next Due 1/4/2022      Done 1/4/2019      Patient has more history with this topic...          Patient Care Team:  America Mujica NP as PCP - General (Family Medicine)  America Mujica NP as Assigned PCP        History     Reviewed By Date/Time Sections Reviewed    America Mujica NP 6/10/2021  7:44 AM Tobacco    America Mujica NP 6/10/2021  7:37 AM Tobacco    Raine Hoang MA 6/10/2021  7:31 AM Tobacco            Objective:   Vital Signs:   Visit Vitals  /64   Pulse 84   Ht 5' 8\" (1.727 m)   Wt (!) 264 lb 6.4 oz (119.9 kg)   LMP 06/04/2021 (Exact Date)   Breastfeeding Yes   BMI 40.20 kg/m           PHYSICAL EXAM  General Appearance: Alert, cooperative, no distress, appears stated age  Head: Normocephalic, without obvious abnormality, atraumatic  Eyes: PERRL, conjunctiva/corneas clear, EOM's intact  Ears: Normal TM's and external ear canals, both ears  Nose: Nares normal, septum midline,mucosa normal, no drainage  Throat: Lips, mucosa, and tongue normal; teeth and gums normal  Neck: Supple, symmetrical, trachea midline, no adenopathy;  thyroid: not enlarged, symmetric, no tenderness/mass/nodules  Lungs: Clear to auscultation bilaterally, respirations unlabored  Heart: Regular rate and rhythm, S1 and S2 normal, no murmur, rub, or gallop  Abdomen: Soft, non-tender, bowel sounds active all four quadrants,  no masses, no organomegaly  Extremities: Extremities normal, atraumatic, no cyanosis or edema  Skin: Skin color, texture, turgor normal, no rashes  Lymph nodes: Cervical, supraclavicular nodes normal  Neurologic: Normal   Psychologic: appropriate affective, answers all of my questions " appropriately. No hallucinations, delusion, or suicidal ideations.    America Mujica, NP

## 2021-06-26 NOTE — PROGRESS NOTES
"Progress Notes by Addison Coates DO at 7/16/2018  5:40 PM     Author: Addison Coates DO Service: -- Author Type: Physician    Filed: 7/16/2018 11:45 PM Encounter Date: 7/16/2018 Status: Signed    : Addison Coates DO (Physician)       Chief Complaint   Patient presents with   ? Sore Throat     started 2 weeks ago.  Has had tonsils out - green discharge from the nose too. denies fevers      History of Present Illness:    Alejandra Moss is a 33 year old female who presents to [facility] walk in clinic for the past 2 weeks as developed sinus pressure with drainage and sore throat    The patient's feels that her sinuses are tender and there is significant drainage from her nose.  The patient denies any fevers or chills.  The patient denies any body aches.  No swollen lymph nodes.  No abdominal pain, nausea and emesis.      Review of systems: See history of present illness, otherwise negative.   Current Outpatient Prescriptions   Medication Sig Dispense Refill   ? azithromycin (ZITHROMAX Z-CHRISTIANO) 250 MG tablet Take 2 tablets (500 mg) on  Day 1,  followed by 1 tablet (250 mg) once daily on Days 2 through 5. 6 tablet 0     No current facility-administered medications for this visit.       No past medical history on file.   Past Surgical History:   Procedure Laterality Date   ? 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 TONSILLECTOMY        Social History     Social History   ? Marital status:      Spouse name: Mane   ? Number of children: 1   ? Years of education: N/A     Occupational History   ?  - [employer]      Doctorate, In Counseling.      Social History Main Topics   ? Smoking status: Never Smoker   ? Smokeless tobacco: Never Used   ? Alcohol use 1.0 oz/week     2 Standard drinks or equivalent per week      Comment: 1-2drinks per week   ? Drug use: No   ? Sexual activity: Yes     Partners: Male     Other Topics Concern   ? None     Social History " Narrative      History   Smoking Status   ? Never Smoker   Smokeless Tobacco   ? Never Used      Exam:   Blood pressure 132/78, pulse 72, temperature 98.8  F (37.1  C), temperature source Oral, resp. rate 20, weight (!) 247 lb 3.2 oz (112.1 kg), last menstrual period 07/05/2018, SpO2 97 %, not currently breastfeeding.   General: Pleasant 34 y/o female in NAD.  HEENT: Posterior oropharynx is erythematous, no tonsillar exudates.  Sinus and facial pressure.  NECK: No lymphadenopathy.  Respiratory: Lungs are clear to auscultation and percussion.  CVS: Regular rate and rhythm without murmur, rub, or gallop      Recent Results (from the past 24 hour(s))   Rapid Strep A Screen-Throat   Result Value Ref Range    Rapid Strep A Antigen No Group A Strep detected, presumptive negative No Group A Strep detected, presumptive negative      Assessment/Plan   1. Sinusitis  Rapid Strep A Screen-Throat    Group A Strep, RNA Direct Detection, Throat      Patient Instructions       Discussed with the patient about her symptoms being more consistent with sinus infection and prescribe her a course of Z-Jonathan.  She in the past had diarrhea with Augmentin.    Acute Bacterial Rhinosinusitis (ABRS)    Acute bacterial rhinosinusitis (ABRS) is an infection of your nasal cavity and sinuses. Its caused by bacteria. Acute means that youve had symptoms for less than 4 weeks, but possibly up to 12 weeks.  Understanding your sinuses  The nasal cavity is the large air-filled space behind your nose. The sinuses are a group of spaces formed by the bones of your face. They connect with your nasal cavity. ABRS causes the tissue lining these spaces to become inflamed. Mucus may not drain normally. This leads to facial pain and other symptoms.  What causes ABRS?  ABRS most often follows an upper respiratory infection caused by a virus. Bacteria then infect the lining of your nasal cavity and sinuses. But you can also get ABRS if you have:    Nasal  allergies    Long-term nasal swelling and congestion not caused by allergies    Blockage in the nose  Symptoms of ABRS  The symptoms of ABRS may be different for each person and include:    Nasal congestion or blockage    Pain or pressure in the face    Thick, colored drainage from the nose  Other symptoms may include:    Runny nose    Fluid draining from the nose down the throat (postnasal drip)    Headache    Cough    Pain    Fever  Diagnosing ABRS  ABRS may be diagnosed if youve had an upper respiratory infection like a cold and cough for 10 or more days without improvement or with worsening symptoms. Your healthcare provider will ask about your symptoms and your medical history. The provider will check your vital signs, including your temperature. Youll have a physical exam. The healthcare provider will check your ears, nose, and throat. You likely wont need any tests. If ABRS comes back, you may have a culture or other tests.  Treatment for ABRS  Treatment may include:    Antibiotic medicine. This is for symptoms that last for at least 10 to 14 days.    Nasal corticosteroid medicine. Drops or spray used in the nose can lessen swelling and congestion.    Over-the-counter pain medicine. This is to lessen sinus pain and pressure.    Nasal decongestant medicine. Spray or drops may help to lessen congestion. Do not use them for more than a few days.    Salt wash (saline irrigation). This can help to loosen mucus.  Possible complications of ABRS  ABRS may come back or become long-term (chronic). In rare cases, ABRS may cause complications such as:     Inflamed tissue around the brain and spinal cord (meningitis)    Inflamed tissue around the eyes (orbital cellulitis)    Inflamed bones around the sinuses (osteitis)  These problems may need to be treated in a hospital with intravenous (IV) antibiotic medicine or surgery.  When to call the healthcare provider  Call your healthcare provider if you have any of the  following:    Symptoms that dont get better, or get worse    Symptoms that dont get better after 3 to 5 days on antibiotics    Trouble seeing    Swelling around your eyes    Confusion or trouble staying awake   Date Last Reviewed: 5/1/2017 2000-2017 [employer]. [address] [place] [id number]. All rights reserved. This information is not intended as a substitute for professional medical care. Always follow your healthcare professional's instructions.           Addison Coates, DO

## 2021-06-27 ENCOUNTER — RECORDS - HEALTHEAST (OUTPATIENT)
Dept: ADMINISTRATIVE | Facility: OTHER | Age: 36
End: 2021-06-27

## 2021-06-29 ENCOUNTER — RECORDS - HEALTHEAST (OUTPATIENT)
Dept: HEALTH INFORMATION MANAGEMENT | Facility: CLINIC | Age: 36
End: 2021-06-29

## 2021-07-04 NOTE — PATIENT INSTRUCTIONS - HE
Patient Instructions by America Mujica NP at 6/10/2021  7:40 AM     Author: America Mujica NP Service: -- Author Type: Nurse Practitioner    Filed: 6/10/2021  7:50 AM Encounter Date: 6/10/2021 Status: Signed    : America Mujica NP (Nurse Practitioner)       Patient Education     Prevention Guidelines, Women Ages 18 to 39  Screening tests and vaccines are an important part of managing your health. A screening test is done to find possible disorders or diseases in people who don't have any symptoms. The goal is to find a disease early so lifestyle changes can be made and you can be watched more closely to reduce the risk of disease, or to detect it early enough to treat it most effectively. Screening tests are not considered diagnostic, but are used to determine if more testing is needed. Health counseling is essential, too. Below are guidelines for these, for women ages 18 to 39. Talk with your healthcare provider to make sure youre up-to-date on what you need.  Screening Who needs it How often   Alcohol misuse All women in this age group At routine exams   Blood pressure All women in this age group Yearly checkup if your blood pressure is normal  Normal blood pressure is less than 120/80 mm Hg  If your blood pressure reading is higher than normal, follow the advice of your healthcare provider   Breast cancer All women in this age group should talk with their healthcare providers about the need for clinical breast exams (CBE)1 Clinical breast exam every 3 years1   Cervical cancer Women ages 21 and older Women between ages 21 and 29 should have a Pap test every 3 years; women between ages 30 and 65 are advised to have a Pap test plus an HPV test every 5 years   Chlamydia Sexually active women ages 25 and younger, and women at increased risk for infection (such as having multiple sex partners) Every year if you're at risk or have symptoms   Depression All women in this age group At routine exams   Type 2  diabetes, prediabetes All women with no symptoms who are overweight or obese and have 1 or more other risk factors for diabetes At least every 3 years. Also, testing for diabetes during pregnancy after the 24th week.    Type 2 diabetes, prediabetes All women diagnosed with gestational diabetes Lifelong testing every 3 years   Type 2 diabetes All women with prediabetes Every year   Gonorrhea Sexually active women at increased risk for infection At routine exams   Hepatitis C Anyone at increased risk At routine exams   HIV All women should be tested at least once for HIV between the ages of 13 and 64 At routine exams. Those with risk factors for HIV should be tested at least annually.    Obesity All women in this age group At routine exams   Syphilis Women at increased risk for infection should talk with their healthcare provider At routine exams   Tuberculosis Women at increased risk for infection should talk with their healthcare provider Ask your healthcare provider   Vision All women in this age group At least 1 complete exam in your 20s, and 2 in your 30s   Vaccine2 Who needs it How often   Chickenpox (varicella) All women in this age group who have no record of this infection or vaccine 2 doses; the second dose should be given 4 to 8 weeks after the first dose   Hepatitis A Women at increased risk for infection should talk with their healthcare provider 2 doses given at least 6 months apart   Hepatitis B Women at increased risk for infection should talk with their healthcare provider 3 doses over 6 months; second dose should be given 1 month after the first dose; the third dose should be given at least 2 months after the second dose and at least 4 months after the first dose   Haemophilus influenzae Type B (HIB) Women at increased risk for infection should talk with their healthcare provider 1 to 3 doses   Human papillomavirus (HPV) All women in this age group up to age 26 3 doses; the second dose should be  given 1 to 2 months after the first dose and the third dose given 6 months after the first dose   Influenza (flu) All women in this age group Once a year   Measles, mumps, rubella (MMR) All women in this age group who have no record of these infections or vaccines 1 or 2 doses   Meningococcal Women at increased risk for infection should talk with their healthcare provider 1 or more doses   Pneumococcal conjugate vaccine (PCV13) and pneumococcal polysaccharide vaccine (PPSV23) Women at increased risk for infection should talk with their healthcare provider PCV13: 1 dose ages 19 to 65 (protects against 13 types of pneumococcal bacteria)  PPSV23: 1 to 2 doses through age 64, or 1 dose at 65 or older (protects against 23 types of pneumococcal bacteria)      Tetanus/diphtheria/pertussis (Td/Tdap) booster All women in this age group Td every 10 years, or a one-time dose of Tdap instead of a Td booster after age 18, then Td every 10 years   Counseling Who needs it How often   BRCA gene mutation testing for breast and ovarian cancer susceptibility Women with increased risk for having gene mutation When your risk is known   Breast cancer and chemoprevention Women at high risk for breast cancer When your risk is known   Diet and exercise Women who are overweight or obese When diagnosed, and then at routine exams   Domestic violence Women at the age in which they are able to have children At routine exams   Sexually transmitted infection prevention Women who are sexually active At routine exams   Skin cancer Prevention of skin cancer in fair-skinned adults At routine exams   Use of tobacco and the health effects it can cause All women in this age group Every visit   1 According to the ACS, women ages 20 to 39 years should have a clinical breast exam (CBE) as part of their routine health exam every 3 years. Breast self-exams are an option for women starting in their 20s. But the USPSTF does not recommend CBE.  Date Last  Reviewed: 10/1/2017    8825-8179 The "[employer] "[employer]. [address] [place] [address], [place] [address]. All rights reserved. This information is not intended as a substitute for professional medical care. Always follow your healthcare professional's instructions.

## 2021-07-06 VITALS
SYSTOLIC BLOOD PRESSURE: 116 MMHG | HEART RATE: 84 BPM | BODY MASS INDEX: 40.07 KG/M2 | DIASTOLIC BLOOD PRESSURE: 64 MMHG | HEIGHT: 68 IN | WEIGHT: 264.4 LBS

## 2021-08-11 ENCOUNTER — MYC MEDICAL ADVICE (OUTPATIENT)
Dept: FAMILY MEDICINE | Facility: CLINIC | Age: 36
End: 2021-08-11

## 2021-08-25 ENCOUNTER — MYC MEDICAL ADVICE (OUTPATIENT)
Dept: FAMILY MEDICINE | Facility: CLINIC | Age: 36
End: 2021-08-25

## 2021-08-25 DIAGNOSIS — Z12.83 SKIN EXAM, SCREENING FOR CANCER: Primary | ICD-10-CM

## 2021-10-10 ENCOUNTER — HEALTH MAINTENANCE LETTER (OUTPATIENT)
Age: 36
End: 2021-10-10

## 2021-11-10 ENCOUNTER — OFFICE VISIT (OUTPATIENT)
Dept: DERMATOLOGY | Facility: CLINIC | Age: 36
End: 2021-11-10
Payer: COMMERCIAL

## 2021-11-10 VITALS
OXYGEN SATURATION: 98 % | SYSTOLIC BLOOD PRESSURE: 145 MMHG | BODY MASS INDEX: 41.21 KG/M2 | DIASTOLIC BLOOD PRESSURE: 88 MMHG | WEIGHT: 271 LBS | HEART RATE: 88 BPM

## 2021-11-10 DIAGNOSIS — D18.01 CHERRY ANGIOMA: ICD-10-CM

## 2021-11-10 DIAGNOSIS — L85.8 KP (KERATOSIS PILARIS): ICD-10-CM

## 2021-11-10 DIAGNOSIS — D22.9 MULTIPLE BENIGN NEVI: ICD-10-CM

## 2021-11-10 DIAGNOSIS — L73.9 FOLLICULITIS: Primary | ICD-10-CM

## 2021-11-10 PROCEDURE — 99203 OFFICE O/P NEW LOW 30 MIN: CPT | Performed by: PHYSICIAN ASSISTANT

## 2021-11-10 NOTE — PROGRESS NOTES
"Alejandra Moss is an extremely pleasant 36 year old year old female patient here today for mole check and rough skin. She notes she has had rough and bumpy skin for over a year. She reports she denies any itchy. Patient has no other skin complaints today.  Remainder of the HPI, Meds, PMH, Allergies, FH, and SH was reviewed in chart.    Pertinent Hx:  No personal history of skin cancer. Family history of precancerous areas.   Past Medical History:   Diagnosis Date     Female fertility problems 3/15/2019     Infertility of tubal origin     presumed due to intraabdominal adhesions     Obesity      Tonsillitis        Past Surgical History:   Procedure Laterality Date     ABDOMEN SURGERY      premature, \"intestines not fully developed\"  age 4 y/o     CERCLAGE CERVICAL N/A 9/4/2019    Procedure: CERCLAGE, CERVIX, VAGINAL APPROACH;  Surgeon: Cathryn Bermudez MD;  Location:  [facility]     LAPAROTOMY EXPLORATORY       [place] LAP,DIAGNOSTIC ABDOMEN Bilateral 3/19/2019    Procedure: LAPAROSCOPY WITH LYSIS OF ADHESIONS;  Surgeon: Yesika Light MD;  Location: [facility];  Service: Gynecology     SMALL BOWEL RESECTION       TONSILLECTOMY       TONSILLECTOMY, ADENOIDECTOMY ADULT, COMBINED  9/26/2013    Procedure: COMBINED TONSILLECTOMY, ADENOIDECTOMY ADULT;  TONSILLECTOMY ;  Surgeon: Prateek Sifuentes MD;  Location: [facility]          Family History   Problem Relation Age of Onset     Diabetes Mother      Heart Disease Father      Diabetes Father      Heart Disease Maternal Aunt      Obesity Maternal Aunt      Pancreatic Cancer Maternal Grandmother      Heart Disease Maternal Grandfather      Cancer Maternal Grandfather      Depression Mother      Lupus Mother      Clotting Disorder Mother      Hypertension Father      Clotting Disorder Sister      Lupus Sister        Social History     Socioeconomic History     Marital status:      Spouse name: Not on file     Number of children: 1     Years of " education: Not on file     Highest education level: Not on file   Occupational History     Not on file   Tobacco Use     Smoking status: Never Smoker     Smokeless tobacco: Never Used   Substance and Sexual Activity     Alcohol use: Yes     Alcohol/week: 1.7 standard drinks     Comment: Alcoholic Drinks/day: 1-2drinks per week     Drug use: No     Sexual activity: Yes     Partners: Male     Birth control/protection: None   Other Topics Concern     Not on file   Social History Narrative    ** Merged History Encounter **          Social Determinants of Health     Financial Resource Strain: Not on file   Food Insecurity: Not on file   Transportation Needs: Not on file   Physical Activity: Not on file   Stress: Not on file   Social Connections: Not on file   Intimate Partner Violence: Not on file   Housing Stability: Not on file       Outpatient Encounter Medications as of 11/10/2021   Medication Sig Dispense Refill     Prenatal Vit-Fe Fumarate-FA (PRENATAL MULTIVITAMIN W/IRON) 27-0.8 MG tablet Take 1 tablet by mouth daily       vitamin D3 (CHOLECALCIFEROL) 2000 units (50 mcg) tablet Take 1 tablet by mouth daily       aspirin 81 MG EC tablet Take 81 mg by mouth daily (Patient not taking: Reported on 11/10/2021)       blood glucose (ACCU-CHEK MICHELLE) test strip Use to test blood sugar four times daily or as directed. (Patient not taking: Reported on 11/10/2021) 200 each 3     blood glucose monitoring (ACCU-CHEK MICHELLE PLUS) meter device kit Use to test blood sugar 4 times daily or as directed. (Patient not taking: Reported on 11/10/2021) 1 kit 0     blood glucose monitoring (ACCU-CHEK MULTICLIX) lancets Test 4 times daily. (Patient not taking: Reported on 11/10/2021) 204 each 4     blood glucose monitoring (ONE TOUCH ULTRA 2) meter device kit Use to test blood sugar 4 times daily or as directed. (Patient not taking: Reported on 11/10/2021) 1 kit 0     insulin aspart (NOVOLOG PEN) 100 UNIT/ML pen Inject 2 Units Subcutaneous  daily (with breakfast) 15 mL 3     insulin glargine (BASAGLAR KWIKPEN) 100 UNIT/ML pen Inject 15 Units Subcutaneous daily 15 mL 3     insulin pen needle (BD OJ U/F) 32G X 4 MM miscellaneous Use once daily as directed. (Patient not taking: Reported on 11/10/2021) 100 each PRN     No facility-administered encounter medications on file as of 11/10/2021.             O:   NAD, WDWN, Alert & Oriented, Mood & Affect wnl, Vitals stable   Here today alone   BP (!) 145/88 (BP Location: Right arm, Patient Position: Sitting, Cuff Size: Adult Large)   Pulse 88   Wt 122.9 kg (271 lb)   SpO2 98%   BMI 41.21 kg/m     General appearance normal   Vitals stable   Alert, oriented and in no acute distress     Perifollicular papules on arms   Few inflammatory on back, buttock   Red papules on trunk  Brown papules and macules with regular pigment network and borders on torso and extremities     The remainder of skin exam is normal       Eyes: Conjunctivae/lids:Normal     ENT: Lips: normal    MSK:Normal    Cardiovascular: peripheral edema none    Pulm: Breathing Normal    Neuro/Psych: Orientation:Alert and Orientedx3 ; Mood/Affect:normal   A/P:  1. Keratosis Pilaris  Start Eucerin rough and bumps moisturizers.   2. Folliculitis   Use Neutrogena T-sal body wash.   3. Benign nevi, cherry angioma   BENIGN LESIONS DISCUSSED WITH PATIENT:  I discussed the specifics of tumor, prognosis, and genetics of benign lesions.  I explained that treatment of these lesions would be purely cosmetic and not medically neccessary.  I discussed with patient different removal options including excision, cautery and /or laser.      Nature and genetics of benign skin lesions dicussed with patient.  Signs and Symptoms of skin cancer discussed with patient.  ABCDEs of melanoma reviewed with patient.  Patient encouraged to perform monthly skin exams.  UV precautions reviewed with patient.  Risks of non-melanoma skin cancer discussed with patient   Return to  clinic in one year or sooner if needed.

## 2021-11-10 NOTE — NURSING NOTE
"Chief Complaint   Patient presents with     Skin Check     whole body skin check       Vitals:    11/10/21 1056   BP: (!) 145/88   BP Location: Right arm   Patient Position: Sitting   Cuff Size: Adult Large   Pulse: 88   SpO2: 98%   Weight: 122.9 kg (271 lb)     Wt Readings from Last 1 Encounters:   11/10/21 122.9 kg (271 lb)     Ht Readings from Last 1 Encounters:   06/10/21 1.727 m (5' 8\")       Kirt Brown [facility], 11/10/2021 10:57 AM    "

## 2021-11-10 NOTE — LETTER
"    11/10/2021         RE: Alejandra Moss  [address]  [place] [address]        Dear Colleague,    Thank you for referring your patient, Alejandra Moss, to the [facility]. Please see a copy of my visit note below.    Alejandra Moss is an extremely pleasant 36 year old year old female patient here today for mole check and rough skin. She notes she has had rough and bumpy skin for over a year. She reports she denies any itchy. Patient has no other skin complaints today.  Remainder of the HPI, Meds, PMH, Allergies, FH, and SH was reviewed in chart.    Pertinent Hx:  No personal history of skin cancer. Family history of precancerous areas.   Past Medical History:   Diagnosis Date     Female fertility problems 3/15/2019     Infertility of tubal origin     presumed due to intraabdominal adhesions     Obesity      Tonsillitis        Past Surgical History:   Procedure Laterality Date     ABDOMEN SURGERY      premature, \"intestines not fully developed\"  age 4 y/o     CERCLAGE CERVICAL N/A 9/4/2019    Procedure: CERCLAGE, CERVIX, VAGINAL APPROACH;  Surgeon: Cathryn Bermudez MD;  Location:  [facility]     LAPAROTOMY EXPLORATORY       [place] LAP,DIAGNOSTIC ABDOMEN Bilateral 3/19/2019    Procedure: LAPAROSCOPY WITH LYSIS OF ADHESIONS;  Surgeon: Yesika Light MD;  Location: [facility];  Service: Gynecology     SMALL BOWEL RESECTION       TONSILLECTOMY       TONSILLECTOMY, ADENOIDECTOMY ADULT, COMBINED  9/26/2013    Procedure: COMBINED TONSILLECTOMY, ADENOIDECTOMY ADULT;  TONSILLECTOMY ;  Surgeon: Prateek Sifuentes MD;  Location: [facility]          Family History   Problem Relation Age of Onset     Diabetes Mother      Heart Disease Father      Diabetes Father      Heart Disease Maternal Aunt      Obesity Maternal Aunt      Pancreatic Cancer Maternal Grandmother      Heart Disease Maternal Grandfather      Cancer Maternal Grandfather      Depression Mother      Lupus Mother "      Clotting Disorder Mother      Hypertension Father      Clotting Disorder Sister      Lupus Sister        Social History     Socioeconomic History     Marital status:      Spouse name: Not on file     Number of children: 1     Years of education: Not on file     Highest education level: Not on file   Occupational History     Not on file   Tobacco Use     Smoking status: Never Smoker     Smokeless tobacco: Never Used   Substance and Sexual Activity     Alcohol use: Yes     Alcohol/week: 1.7 standard drinks     Comment: Alcoholic Drinks/day: 1-2drinks per week     Drug use: No     Sexual activity: Yes     Partners: Male     Birth control/protection: None   Other Topics Concern     Not on file   Social History Narrative    ** Merged History Encounter **          Social Determinants of Health     Financial Resource Strain: Not on file   Food Insecurity: Not on file   Transportation Needs: Not on file   Physical Activity: Not on file   Stress: Not on file   Social Connections: Not on file   Intimate Partner Violence: Not on file   Housing Stability: Not on file       Outpatient Encounter Medications as of 11/10/2021   Medication Sig Dispense Refill     Prenatal Vit-Fe Fumarate-FA (PRENATAL MULTIVITAMIN W/IRON) 27-0.8 MG tablet Take 1 tablet by mouth daily       vitamin D3 (CHOLECALCIFEROL) 2000 units (50 mcg) tablet Take 1 tablet by mouth daily       aspirin 81 MG EC tablet Take 81 mg by mouth daily (Patient not taking: Reported on 11/10/2021)       blood glucose (ACCU-CHEK MICHELLE) test strip Use to test blood sugar four times daily or as directed. (Patient not taking: Reported on 11/10/2021) 200 each 3     blood glucose monitoring (ACCU-CHEK MICHELLE PLUS) meter device kit Use to test blood sugar 4 times daily or as directed. (Patient not taking: Reported on 11/10/2021) 1 kit 0     blood glucose monitoring (ACCU-CHEK MULTICLIX) lancets Test 4 times daily. (Patient not taking: Reported on 11/10/2021) 204 each 4      blood glucose monitoring (ONE TOUCH ULTRA 2) meter device kit Use to test blood sugar 4 times daily or as directed. (Patient not taking: Reported on 11/10/2021) 1 kit 0     insulin aspart (NOVOLOG PEN) 100 UNIT/ML pen Inject 2 Units Subcutaneous daily (with breakfast) 15 mL 3     insulin glargine (BASAGLAR KWIKPEN) 100 UNIT/ML pen Inject 15 Units Subcutaneous daily 15 mL 3     insulin pen needle (BD OJ U/F) 32G X 4 MM miscellaneous Use once daily as directed. (Patient not taking: Reported on 11/10/2021) 100 each PRN     No facility-administered encounter medications on file as of 11/10/2021.             O:   NAD, WDWN, Alert & Oriented, Mood & Affect wnl, Vitals stable   Here today alone   BP (!) 145/88 (BP Location: Right arm, Patient Position: Sitting, Cuff Size: Adult Large)   Pulse 88   Wt 122.9 kg (271 lb)   SpO2 98%   BMI 41.21 kg/m     General appearance normal   Vitals stable   Alert, oriented and in no acute distress     Perifollicular papules on arms   Few inflammatory on back, buttock   Red papules on trunk  Brown papules and macules with regular pigment network and borders on torso and extremities     The remainder of skin exam is normal       Eyes: Conjunctivae/lids:Normal     ENT: Lips: normal    MSK:Normal    Cardiovascular: peripheral edema none    Pulm: Breathing Normal    Neuro/Psych: Orientation:Alert and Orientedx3 ; Mood/Affect:normal   A/P:  1. Keratosis Pilaris  Start Eucerin rough and bumps moisturizers.   2. Folliculitis   Use Neutrogena T-sal body wash.   3. Benign nevi, cherry angioma   BENIGN LESIONS DISCUSSED WITH PATIENT:  I discussed the specifics of tumor, prognosis, and genetics of benign lesions.  I explained that treatment of these lesions would be purely cosmetic and not medically neccessary.  I discussed with patient different removal options including excision, cautery and /or laser.      Nature and genetics of benign skin lesions dicussed with patient.  Signs and  Symptoms of skin cancer discussed with patient.  ABCDEs of melanoma reviewed with patient.  Patient encouraged to perform monthly skin exams.  UV precautions reviewed with patient.  Risks of non-melanoma skin cancer discussed with patient   Return to clinic in one year or sooner if needed.         Again, thank you for allowing me to participate in the care of your patient.        Sincerely,        Mary Viveros PA-C

## 2022-03-23 ENCOUNTER — OFFICE VISIT (OUTPATIENT)
Dept: FAMILY MEDICINE | Facility: CLINIC | Age: 37
End: 2022-03-23
Payer: COMMERCIAL

## 2022-03-23 VITALS
WEIGHT: 271 LBS | SYSTOLIC BLOOD PRESSURE: 128 MMHG | OXYGEN SATURATION: 99 % | BODY MASS INDEX: 41.21 KG/M2 | DIASTOLIC BLOOD PRESSURE: 72 MMHG | TEMPERATURE: 98.4 F | HEART RATE: 111 BPM

## 2022-03-23 DIAGNOSIS — E66.01 MORBID OBESITY (H): ICD-10-CM

## 2022-03-23 DIAGNOSIS — R51.9 RECURRENT HEADACHE: ICD-10-CM

## 2022-03-23 DIAGNOSIS — R59.1 LYMPHADENOPATHY: ICD-10-CM

## 2022-03-23 DIAGNOSIS — J01.90 ACUTE SINUSITIS WITH SYMPTOMS > 10 DAYS: Primary | ICD-10-CM

## 2022-03-23 LAB
ALBUMIN SERPL-MCNC: 4 G/DL (ref 3.4–5)
ALP SERPL-CCNC: 45 U/L (ref 40–150)
ALT SERPL W P-5'-P-CCNC: 26 U/L (ref 0–50)
ANION GAP SERPL CALCULATED.3IONS-SCNC: 4 MMOL/L (ref 3–14)
AST SERPL W P-5'-P-CCNC: 14 U/L (ref 0–45)
BILIRUB SERPL-MCNC: 0.3 MG/DL (ref 0.2–1.3)
BUN SERPL-MCNC: 9 MG/DL (ref 7–30)
CALCIUM SERPL-MCNC: 9 MG/DL (ref 8.5–10.1)
CHLORIDE BLD-SCNC: 103 MMOL/L (ref 94–109)
CO2 SERPL-SCNC: 30 MMOL/L (ref 20–32)
CREAT SERPL-MCNC: 0.81 MG/DL (ref 0.52–1.04)
ERYTHROCYTE [DISTWIDTH] IN BLOOD BY AUTOMATED COUNT: 13.2 % (ref 10–15)
GFR SERPL CREATININE-BSD FRML MDRD: >90 ML/MIN/1.73M2
GLUCOSE BLD-MCNC: 110 MG/DL (ref 70–99)
HCT VFR BLD AUTO: 44.3 % (ref 35–47)
HGB BLD-MCNC: 14.2 G/DL (ref 11.7–15.7)
MCH RBC QN AUTO: 29.8 PG (ref 26.5–33)
MCHC RBC AUTO-ENTMCNC: 32.1 G/DL (ref 31.5–36.5)
MCV RBC AUTO: 93 FL (ref 78–100)
PLATELET # BLD AUTO: 358 10E3/UL (ref 150–450)
POTASSIUM BLD-SCNC: 3.9 MMOL/L (ref 3.4–5.3)
PROT SERPL-MCNC: 7.3 G/DL (ref 6.8–8.8)
RBC # BLD AUTO: 4.77 10E6/UL (ref 3.8–5.2)
SODIUM SERPL-SCNC: 137 MMOL/L (ref 133–144)
TSH SERPL DL<=0.005 MIU/L-ACNC: 1.54 MU/L (ref 0.4–4)
WBC # BLD AUTO: 6.1 10E3/UL (ref 4–11)

## 2022-03-23 PROCEDURE — 85027 COMPLETE CBC AUTOMATED: CPT | Performed by: PHYSICIAN ASSISTANT

## 2022-03-23 PROCEDURE — 80053 COMPREHEN METABOLIC PANEL: CPT | Performed by: PHYSICIAN ASSISTANT

## 2022-03-23 PROCEDURE — 36415 COLL VENOUS BLD VENIPUNCTURE: CPT | Performed by: PHYSICIAN ASSISTANT

## 2022-03-23 PROCEDURE — 99214 OFFICE O/P EST MOD 30 MIN: CPT | Performed by: PHYSICIAN ASSISTANT

## 2022-03-23 PROCEDURE — 84443 ASSAY THYROID STIM HORMONE: CPT | Performed by: PHYSICIAN ASSISTANT

## 2022-03-23 RX ORDER — DOXYCYCLINE 100 MG/1
100 CAPSULE ORAL 2 TIMES DAILY
Qty: 20 CAPSULE | Refills: 0 | Status: SHIPPED | OUTPATIENT
Start: 2022-03-23 | End: 2022-04-02

## 2022-03-23 ASSESSMENT — PAIN SCALES - GENERAL: PAINLEVEL: NO PAIN (0)

## 2022-03-23 ASSESSMENT — ENCOUNTER SYMPTOMS: HEADACHES: 1

## 2022-03-23 NOTE — PROGRESS NOTES
"  Rebecca Roberts is a 37 year old who presents for the following health issues    Mass  Associated symptoms include headaches.   Headache     History of Present Illness       Migraines:   Since the patient's last clinic visit, headaches are: worsened  The patient is getting headaches:  ~weekly, for 1-2 days  She is able to do normal daily activities when she has a migraine.  The patient is taking the following rescue/relief medications:  Ibuprofen (Advil, Motrin) and Excedrin   Patient states \"I get some relief\" from the rescue/relief medications.   The patient is taking the following medications to prevent migraines:  No medications to prevent migraines  In the past 4 weeks, the patient has gone to an Urgent Care or Emergency Room 0 times times due to headaches.    Reason for visit:  Lump in neck, right side, under ear. 2+ weeks  Symptom onset:  1-2 weeks ago  Symptoms include:  No symptoms but recurring headaches, new for me  Symptom intensity:  Moderate  Symptom progression:  Staying the same  Had these symptoms before:  No  What makes it better:  Minimal improvement with excedrin or aspirin    She eats 4 or more servings of fruits and vegetables daily.She consumes 1 sweetened beverage(s) daily.She exercises with enough effort to increase her heart rate 10 to 19 minutes per day.  She exercises with enough effort to increase her heart rate 3 or less days per week.   She is taking medications regularly.     Patient reports over the last few weeks she has had more headaches, sinus pressure and has noticed a lump under her right ear. She reports headaches have been occurring 1-2 days a week which is different for her. Headaches tend to be more on the left side of her head. She reports she has been good about drinking water. She does have a lot of screen time in her day due to her work. She reports often the headaches do not resolve with rest. They do improve with excedrine or ASA. No family history of migraines " or other neurological issues. She does have history of sinus infections.     Review of Systems   Neurological: Positive for headaches.      Constitutional, HEENT, cardiovascular, pulmonary, GI, , musculoskeletal, neuro, skin, endocrine and psych systems are negative, except as otherwise noted.      Objective    /72   Pulse 111   Temp 98.4  F (36.9  C) (Temporal)   Wt 122.9 kg (271 lb)   LMP 03/14/2022 (Exact Date)   SpO2 99%   BMI 41.21 kg/m    Body mass index is 41.21 kg/m .  Physical Exam   GENERAL: healthy, alert and no distress  EYES: Eyes grossly normal to inspection, PERRL and conjunctivae and sclerae normal  HENT: normal cephalic/atraumatic, both ears: clear effusion, nose and mouth without ulcers or lesions, oropharynx clear, oral mucous membranes moist and sinuses: maxillary tenderness on left, maxillary swelling on left  NECK: cervical adenopathy - posterior cervical right side. This is mobile and nontender, no asymmetry, masses, or scars and thyroid normal to palpation  RESP: lungs clear to auscultation - no rales, rhonchi or wheezes  CV: regular rate and rhythm, normal S1 S2, no S3 or S4, no murmur, click or rub, no peripheral edema and peripheral pulses strong  MS: no gross musculoskeletal defects noted, no edema  SKIN: no suspicious lesions or rashes  NEURO: Normal strength and tone, sensory exam grossly normal, mentation intact and cranial nerves 2-12 intact  PSYCH: mentation appears normal, affect normal/bright      Assessment & Plan     Acute sinusitis with symptoms > 10 days  Antibiotics started as below. Encouraged continuation of supportive cares with rest, fluids and tylenol/ibuprofen as needed. Patient will follow-up in clinic if new symptoms develop or current symptoms fail to improve.  - doxycycline hyclate (VIBRAMYCIN) 100 MG capsule; Take 1 capsule (100 mg) by mouth 2 times daily for 10 days    Lymphadenopathy - right posterior cervical  Suspect reactive due to sinus infection  and fluid in ears present today. Discussed if this is not improving over the next 2 weeks then ultrasound can be considered.     Recurrent headache  Question if related to sinusitis. May also be screen time related and/or stress. Will obtain labs as below. Encouraged pushing fluids. Decongestant as needed. Patient will update me if this is not improving over the next couple weeks as well.   - CBC with platelets; Future  - Comprehensive metabolic panel (BMP + Alb, Alk Phos, ALT, AST, Total. Bili, TP); Future  - TSH with free T4 reflex; Future  - CBC with platelets  - Comprehensive metabolic panel (BMP + Alb, Alk Phos, ALT, AST, Total. Bili, TP)  - TSH with free T4 reflex    Morbid obesity (H)  Encouraged ongoing diet and exercise modifications.     The patient indicates understanding of these issues and agrees with the plan.    EVELINE Rodriguez [facility], [place]

## 2022-05-19 ENCOUNTER — HOSPITAL ENCOUNTER (OUTPATIENT)
Dept: ULTRASOUND IMAGING | Facility: CLINIC | Age: 37
Discharge: HOME | End: 2022-05-19
Attending: PHYSICIAN ASSISTANT | Admitting: PHYSICIAN ASSISTANT
Payer: COMMERCIAL

## 2022-05-19 DIAGNOSIS — R59.1 LYMPHADENOPATHY: ICD-10-CM

## 2022-05-19 PROCEDURE — 76536 US EXAM OF HEAD AND NECK: CPT

## 2022-07-16 ENCOUNTER — HEALTH MAINTENANCE LETTER (OUTPATIENT)
Age: 37
End: 2022-07-16

## 2022-09-14 ENCOUNTER — MYC MEDICAL ADVICE (OUTPATIENT)
Dept: FAMILY MEDICINE | Facility: CLINIC | Age: 37
End: 2022-09-14

## 2022-09-15 ASSESSMENT — ENCOUNTER SYMPTOMS
NERVOUS/ANXIOUS: 0
FREQUENCY: 0
WEAKNESS: 0
BREAST MASS: 0
DIZZINESS: 0
ABDOMINAL PAIN: 0
DIARRHEA: 0
PALPITATIONS: 0
NAUSEA: 0
ARTHRALGIAS: 0
MYALGIAS: 0
COUGH: 0
DYSURIA: 0
CHILLS: 0
HEADACHES: 0
SORE THROAT: 0
HEMATOCHEZIA: 0
CONSTIPATION: 0
FEVER: 0
PARESTHESIAS: 0
EYE PAIN: 0
HEMATURIA: 0
JOINT SWELLING: 0
HEARTBURN: 0
SHORTNESS OF BREATH: 0

## 2022-09-18 ENCOUNTER — HEALTH MAINTENANCE LETTER (OUTPATIENT)
Age: 37
End: 2022-09-18

## 2022-09-22 ENCOUNTER — OFFICE VISIT (OUTPATIENT)
Dept: FAMILY MEDICINE | Facility: CLINIC | Age: 37
End: 2022-09-22
Payer: COMMERCIAL

## 2022-09-22 VITALS
TEMPERATURE: 97.3 F | BODY MASS INDEX: 41.52 KG/M2 | WEIGHT: 274 LBS | HEIGHT: 68 IN | SYSTOLIC BLOOD PRESSURE: 126 MMHG | HEART RATE: 111 BPM | OXYGEN SATURATION: 97 % | DIASTOLIC BLOOD PRESSURE: 80 MMHG

## 2022-09-22 DIAGNOSIS — Z23 HIGH PRIORITY FOR 2019-NCOV VACCINE: ICD-10-CM

## 2022-09-22 DIAGNOSIS — Z00.00 ROUTINE HISTORY AND PHYSICAL EXAMINATION OF ADULT: Primary | ICD-10-CM

## 2022-09-22 DIAGNOSIS — E66.01 MORBID OBESITY (H): ICD-10-CM

## 2022-09-22 PROBLEM — O34.30 SHIRODKAR CERCLAGE PRESENT: Status: RESOLVED | Noted: 2019-09-04 | Resolved: 2022-09-22

## 2022-09-22 PROCEDURE — 91312 COVID-19,PF,PFIZER BOOSTER BIVALENT: CPT | Performed by: PHYSICIAN ASSISTANT

## 2022-09-22 PROCEDURE — 0124A COVID-19,PF,PFIZER BOOSTER BIVALENT: CPT | Performed by: PHYSICIAN ASSISTANT

## 2022-09-22 PROCEDURE — 90471 IMMUNIZATION ADMIN: CPT | Performed by: PHYSICIAN ASSISTANT

## 2022-09-22 PROCEDURE — 99395 PREV VISIT EST AGE 18-39: CPT | Mod: 25 | Performed by: PHYSICIAN ASSISTANT

## 2022-09-22 PROCEDURE — 90686 IIV4 VACC NO PRSV 0.5 ML IM: CPT | Performed by: PHYSICIAN ASSISTANT

## 2022-09-22 RX ORDER — ACETAMINOPHEN 325 MG/1
325-650 TABLET ORAL
COMMUNITY
End: 2023-03-15

## 2022-09-22 RX ORDER — IBUPROFEN 600 MG/1
600 TABLET, FILM COATED ORAL
COMMUNITY
End: 2023-03-15

## 2022-09-22 ASSESSMENT — ENCOUNTER SYMPTOMS
SORE THROAT: 0
ARTHRALGIAS: 0
HEADACHES: 0
PALPITATIONS: 0
BREAST MASS: 0
HEMATOCHEZIA: 0
DIARRHEA: 0
ABDOMINAL PAIN: 0
EYE PAIN: 0
SHORTNESS OF BREATH: 0
JOINT SWELLING: 0
DIZZINESS: 0
PARESTHESIAS: 0
CHILLS: 0
NERVOUS/ANXIOUS: 0
WEAKNESS: 0
FEVER: 0
CONSTIPATION: 0
NAUSEA: 0
HEARTBURN: 0
COUGH: 0
HEMATURIA: 0
DYSURIA: 0
MYALGIAS: 0
FREQUENCY: 0

## 2022-09-22 ASSESSMENT — PAIN SCALES - GENERAL: PAINLEVEL: NO PAIN (0)

## 2022-09-22 NOTE — PROGRESS NOTES
SUBJECTIVE:   CC: Alejandra is an 37 year old who presents for preventive health visit.       Patient has been advised of split billing requirements and indicates understanding: Yes  Healthy Habits:     Getting at least 3 servings of Calcium per day:  Yes    Bi-annual eye exam:  NO    Dental care twice a year:  Yes    Sleep apnea or symptoms of sleep apnea:  None    Diet:  Regular (no restrictions)    Frequency of exercise:  2-3 days/week    Duration of exercise:  15-30 minutes    Taking medications regularly:  Yes    Medication side effects:  Not applicable    PHQ-2 Total Score: 0    Additional concerns today:  Yes    Has been working more on improving her strength. She is about 20 pounds heavier than she was prior to kids. Would like to get back to 250 lbs. Working on decreasing her night time eating as well which has been helpful.     Periods have been about every 21 days. This has been since having children. Does get migraines around this time but not debilitating. Periods are not heavy.     Has a 10 year old daughter and 2.5 year old daughter. Was raised in [place] and family still lives there.       Today's PHQ-2 Score:   PHQ-2 ( 1999 Pf[employer]) 9/15/2022   Q1: Little interest or pleasure in doing things 0   Q2: Feeling down, depressed or hopeless 0   PHQ-2 Score 0   Q1: Little interest or pleasure in doing things Not at all   Q2: Feeling down, depressed or hopeless Not at all   PHQ-2 Score 0       Abuse: Current or Past (Physical, Sexual or Emotional) - No  Do you feel safe in your environment? Yes        Social History     Tobacco Use     Smoking status: Never Smoker     Smokeless tobacco: Never Used   Substance Use Topics     Alcohol use: Yes     Alcohol/week: 1.7 standard drinks     Comment: Alcoholic Drinks/day: 1-2drinks per week         Alcohol Use 9/15/2022   Prescreen: >3 drinks/day or >7 drinks/week? No       Reviewed orders with patient.  Reviewed health maintenance and updated orders accordingly -  "Yes  BP Readings from Last 3 Encounters:   09/22/22 126/80   03/23/22 128/72   11/10/21 (!) 145/88    Wt Readings from Last 3 Encounters:   09/22/22 124.3 kg (274 lb)   03/23/22 122.9 kg (271 lb)   11/10/21 122.9 kg (271 lb)                  Patient Active Problem List   Diagnosis     Family history of DVT     Short cervix affecting pregnancy     Cervical insufficiency during pregnancy in second trimester, antepartum     Hx of incompetent cervix, currently pregnant, third trimester     Encounter for triage in pregnant patient     Morbid obesity (H)     Past Surgical History:   Procedure Laterality Date     ABDOMEN SURGERY      premature, \"intestines not fully developed\"  age 4 y/o     CERCLAGE CERVICAL N/A 9/4/2019    Procedure: CERCLAGE, CERVIX, VAGINAL APPROACH;  Surgeon: Cathryn Bermudez MD;  Location:  [facility]     LAPAROTOMY EXPLORATORY       [place] LAP,DIAGNOSTIC ABDOMEN Bilateral 3/19/2019    Procedure: LAPAROSCOPY WITH LYSIS OF ADHESIONS;  Surgeon: Yesika Light MD;  Location: [facility];  Service: Gynecology     SMALL BOWEL RESECTION       TONSILLECTOMY       TONSILLECTOMY, ADENOIDECTOMY ADULT, COMBINED  9/26/2013    Procedure: COMBINED TONSILLECTOMY, ADENOIDECTOMY ADULT;  TONSILLECTOMY ;  Surgeon: Prateek Sifuentes MD;  Location: [facility]         Social History     Tobacco Use     Smoking status: Never Smoker     Smokeless tobacco: Never Used   Substance Use Topics     Alcohol use: Yes     Alcohol/week: 1.7 standard drinks     Comment: Alcoholic Drinks/day: 1-2drinks per week     Family History   Problem Relation Age of Onset     Diabetes Mother      Depression Mother      Lupus Mother      Clotting Disorder Mother      Heart Disease Father      Diabetes Father      Hypertension Father      Heart Disease Maternal Aunt      Obesity Maternal Aunt      Pancreatic Cancer Maternal Grandmother      Heart Disease Maternal Grandfather      Cancer Maternal Grandfather      Clotting " Disorder Sister      Lupus Sister          Current Outpatient Medications   Medication Sig Dispense Refill     acetaminophen (TYLENOL) 325 MG tablet Take 325-650 mg by mouth       ibuprofen (ADVIL/MOTRIN) 600 MG tablet Take 600 mg by mouth       Prenatal Vit-Fe Fumarate-FA (PRENATAL MULTIVITAMIN W/IRON) 27-0.8 MG tablet Take 1 tablet by mouth daily       vitamin D3 (CHOLECALCIFEROL) 2000 units (50 mcg) tablet Take 1 tablet by mouth daily         Breast Cancer Screening:    Breast CA Risk Assessment (FHS-7) 9/15/2022   Do you have a family history of breast, colon, or ovarian cancer? No / Unknown       Patient under 40 years of age: Routine Mammogram Screening not recommended.   Pertinent mammograms are reviewed under the imaging tab.    History of abnormal Pap smear: NO - age 30-65 PAP every 5 years with negative HPV co-testing recommended  PAP / HPV Latest Ref Rng & Units 1/4/2019 10/20/2015   PAP Negative for squamous intraepithelial lesion or malignancy. Negative for squamous intraepithelial lesion or malignancy  Electronically signed by Phyllis Roblero CT (ASCP) on 1/11/2019 at  2:43 PM   Negative for squamous intraepithelial lesion or malignancy  Electronically signed by Jose Pierre CT (ASCP) on 11/2/2015 at  4:34 PM     HPV16 NEG Negative -   HPV18 NEG Negative -   HRHPV NEG Negative -     Reviewed and updated as needed this visit by clinical staff   Tobacco  Allergies  Meds   Med Hx  Surg Hx  Fam Hx  Soc Hx          Reviewed and updated as needed this visit by Provider                       Review of Systems   Constitutional: Negative for chills and fever.   HENT: Negative for congestion, ear pain, hearing loss and sore throat.    Eyes: Negative for pain and visual disturbance.   Respiratory: Negative for cough and shortness of breath.    Cardiovascular: Negative for chest pain, palpitations and peripheral edema.   Gastrointestinal: Negative for abdominal pain, constipation, diarrhea,  "heartburn, hematochezia and nausea.   Breasts:  Negative for tenderness, breast mass and discharge.   Genitourinary: Negative for dysuria, frequency, genital sores, hematuria, pelvic pain, urgency, vaginal bleeding and vaginal discharge.   Musculoskeletal: Negative for arthralgias, joint swelling and myalgias.   Skin: Negative for rash.   Neurological: Negative for dizziness, weakness, headaches and paresthesias.   Psychiatric/Behavioral: Negative for mood changes. The patient is not nervous/anxious.         OBJECTIVE:   /80   Pulse 111   Temp 97.3  F (36.3  C) (Temporal)   Ht 1.715 m (5' 7.5\")   Wt 124.3 kg (274 lb)   LMP 09/07/2022   SpO2 97%   BMI 42.28 kg/m    Physical Exam  GENERAL: healthy, alert and no distress  EYES: Eyes grossly normal to inspection, PERRL and conjunctivae and sclerae normal  HENT: ear canals and TM's normal, nose and mouth without ulcers or lesions  NECK: no adenopathy, no asymmetry, masses, or scars and thyroid normal to palpation  RESP: lungs clear to auscultation - no rales, rhonchi or wheezes  CV: regular rate and rhythm, normal S1 S2, no S3 or S4, no murmur, click or rub, no peripheral edema and peripheral pulses strong  ABDOMEN: soft, nontender, no hepatosplenomegaly, no masses and bowel sounds normal  MS: no gross musculoskeletal defects noted, no edema  SKIN: no suspicious lesions or rashes  NEURO: Normal strength and tone, mentation intact and speech normal  PSYCH: mentation appears normal, affect normal/bright    Diagnostic Test Results:  Labs reviewed in Epic    ASSESSMENT/PLAN:   (Z00.00) Routine history and physical examination of adult  (primary encounter diagnosis)    (E66.01) Morbid obesity (H)  Comment: Discussed weight and reviewed diet and exercise modifications for weight loss. She did complete labs last year and again through her work. May want to consider updating later this winter when she has had more time to work on weight loss.     (Z23) High priority " "for 2019-nCoV vaccine  Plan: COVID-19,PF,PFIZER BOOSTER BIVALENT 12+Yrs      Patient has been advised of split billing requirements and indicates understanding: Yes    COUNSELING:  Reviewed preventive health counseling, as reflected in patient instructions    Estimated body mass index is 42.28 kg/m  as calculated from the following:    Height as of this encounter: 1.715 m (5' 7.5\").    Weight as of this encounter: 124.3 kg (274 lb).    Weight management plan: Discussed healthy diet and exercise guidelines    She reports that she has never smoked. She has never used smokeless tobacco.      Counseling Resources:  ATP IV Guidelines  Pooled Cohorts Equation Calculator  Breast Cancer Risk Calculator  BRCA-Related Cancer Risk Assessment: FHS-7 Tool  FRAX Risk Assessment  ICSI Preventive Guidelines  Dietary Guidelines for Americans, 2010  USDA's MyPlate  ASA Prophylaxis  Lung CA Screening    EVELINE Rodriguez [facility]  "

## 2022-11-28 ENCOUNTER — E-VISIT (OUTPATIENT)
Dept: FAMILY MEDICINE | Facility: CLINIC | Age: 37
End: 2022-11-28
Payer: COMMERCIAL

## 2022-11-28 DIAGNOSIS — S70.10XA CONTUSION OF THIGH, UNSPECIFIED LATERALITY, INITIAL ENCOUNTER: Primary | ICD-10-CM

## 2022-11-28 PROCEDURE — 99207 PR NON-BILLABLE SERV PER CHARTING: CPT | Performed by: PHYSICIAN ASSISTANT

## 2023-02-28 ENCOUNTER — E-VISIT (OUTPATIENT)
Dept: FAMILY MEDICINE | Facility: CLINIC | Age: 38
End: 2023-02-28
Payer: COMMERCIAL

## 2023-02-28 DIAGNOSIS — Z53.9 DIAGNOSIS NOT YET DEFINED: Primary | ICD-10-CM

## 2023-02-28 PROCEDURE — 99207 PR NON-BILLABLE SERV PER CHARTING: CPT | Performed by: FAMILY MEDICINE

## 2023-03-01 NOTE — TELEPHONE ENCOUNTER
Provider E-Visit time total (minutes): needs RN triage and likely an in-person office visit or urgent care evaluation.    Woody Mckenzie MD

## 2023-03-01 NOTE — PATIENT INSTRUCTIONS
Dear Alejandra Moss,    We are sorry you are not feeling well. Based on the responses you provided, it is recommended that you be seen in-person for an clinic or urgent care so we can better evaluate your symptoms. Please click here to find the nearest urgent care location to you.   You will not be charged for this Visit. Thank you for trusting us with your care.    I am sending this to our RN triage team to work with you on assessing your symptoms and finding you an appointment.    Woody Mckenzie MD

## 2023-03-02 ENCOUNTER — NURSE TRIAGE (OUTPATIENT)
Dept: FAMILY MEDICINE | Facility: CLINIC | Age: 38
End: 2023-03-02
Payer: COMMERCIAL

## 2023-03-02 NOTE — TELEPHONE ENCOUNTER
Patient submitted an e-visit on 2/23. RN was able to contact patient and patient states she is actually feeling a little better since submitting e-visit. Patient states she is having a wet productive cough. Denies any fever, difficulty breathing or other symptoms. Denies wheezing at this time, but did answer wheezing when submitted e-visit. Has been feeling better since submitting e-visit.    RN advised patient with symptoms she expressed prior and currently, that she should be seen if not improving. Patient wanted to wait until tomorrow to see if she improves more and then will go in if not. RN advised if patient is not improving further to be seen in clinic or  if no appointments. Patient understood and agreed.     RN reviewed red flag symptoms with patient and when to see emergency care. Patient agreed and understood.     ENRIQUETA Monterroso, RN                Reason for Disposition    [1] Nasal discharge AND [2] present > 10 days    Additional Information    Negative: SEVERE difficulty breathing (e.g., struggling for each breath, speaks in single words)    Negative: Bluish (or gray) lips or face now    Negative: [1] Difficulty breathing AND [2] exposure to flames, smoke, or fumes    Negative: [1] Stridor AND [2] difficulty breathing    Negative: Sounds like a life-threatening emergency to the triager    Negative: [1] Previous asthma attacks AND [2] this feels like asthma attack    Negative: Dry cough (non-productive;  no sputum or minimal clear sputum)    Negative: [1] MODERATE difficulty breathing (e.g., speaks in phrases, SOB even at rest, pulse 100-120) AND [2] still present when not coughing    Negative: Chest pain  (Exception: MILD central chest pain, present only when coughing)    Negative: Patient sounds very sick or weak to the triager    Negative: [1] MILD difficulty breathing (e.g., minimal/no SOB at rest, SOB with walking, pulse <100) AND [2] still present when not coughing    Negative: [1]  Coughed up blood AND [2] > 1 tablespoon (15 ml)  (Exception: Blood-tinged sputum.)    Negative: Fever > 103 F (39.4 C)    Negative: [1] Fever > 101 F (38.3 C) AND [2] age > 60 years    Negative: [1] Fever > 100.0 F (37.8 C) AND [2] bedridden (e.g., nursing home patient, CVA, chronic illness, recovering from surgery)    Negative: [1] Fever > 100.0 F (37.8 C) AND [2] diabetes mellitus or weak immune system (e.g., HIV positive, cancer chemo, splenectomy, organ transplant, chronic steroids)    Negative: Wheezing is present    Negative: SEVERE coughing spells (e.g., whooping sound after coughing, vomiting after coughing)    Negative: [1] Continuous (nonstop) coughing interferes with work or school AND [2] no improvement using cough treatment per Care Advice    Negative: Coughing up anders-colored (reddish-brown) sputum    Negative: Fever present > 3 days (72 hours)    Negative: [1] Fever returns after gone for over 24 hours AND [2] symptoms worse or not improved    Negative: [1] Using nasal washes and pain medicine > 24 hours AND [2] sinus pain (around cheekbone or eye) persists    Negative: Earache    Negative: [1] Known COPD or other severe lung disease (i.e., bronchiectasis, cystic fibrosis, lung surgery) AND [2] worsening symptoms (i.e., increased sputum purulence or amount, increased breathing difficulty    Protocols used: COUGH - ACUTE [id number]

## 2023-03-15 ENCOUNTER — OFFICE VISIT (OUTPATIENT)
Dept: FAMILY MEDICINE | Facility: CLINIC | Age: 38
End: 2023-03-15
Payer: COMMERCIAL

## 2023-03-15 VITALS
HEIGHT: 67 IN | SYSTOLIC BLOOD PRESSURE: 130 MMHG | HEART RATE: 91 BPM | OXYGEN SATURATION: 98 % | WEIGHT: 278 LBS | DIASTOLIC BLOOD PRESSURE: 75 MMHG | TEMPERATURE: 98.1 F | RESPIRATION RATE: 14 BRPM | BODY MASS INDEX: 43.63 KG/M2

## 2023-03-15 DIAGNOSIS — E66.01 MORBID OBESITY (H): ICD-10-CM

## 2023-03-15 DIAGNOSIS — J01.90 ACUTE SINUSITIS WITH SYMPTOMS > 10 DAYS: ICD-10-CM

## 2023-03-15 DIAGNOSIS — R53.83 OTHER FATIGUE: ICD-10-CM

## 2023-03-15 DIAGNOSIS — R10.12 LUQ ABDOMINAL PAIN: Primary | ICD-10-CM

## 2023-03-15 LAB
ALBUMIN SERPL-MCNC: 3.9 G/DL (ref 3.4–5)
ALP SERPL-CCNC: 48 U/L (ref 40–150)
ALT SERPL W P-5'-P-CCNC: 25 U/L (ref 0–50)
ANION GAP SERPL CALCULATED.3IONS-SCNC: 5 MMOL/L (ref 3–14)
AST SERPL W P-5'-P-CCNC: 17 U/L (ref 0–45)
BASOPHILS # BLD AUTO: 0 10E3/UL (ref 0–0.2)
BASOPHILS NFR BLD AUTO: 0 %
BILIRUB SERPL-MCNC: 0.4 MG/DL (ref 0.2–1.3)
BUN SERPL-MCNC: 10 MG/DL (ref 7–30)
CALCIUM SERPL-MCNC: 9.1 MG/DL (ref 8.5–10.1)
CHLORIDE BLD-SCNC: 106 MMOL/L (ref 94–109)
CO2 SERPL-SCNC: 26 MMOL/L (ref 20–32)
CREAT SERPL-MCNC: 0.81 MG/DL (ref 0.52–1.04)
DEPRECATED CALCIDIOL+CALCIFEROL SERPL-MC: 39 UG/L (ref 20–75)
EOSINOPHIL # BLD AUTO: 0.1 10E3/UL (ref 0–0.7)
EOSINOPHIL NFR BLD AUTO: 1 %
ERYTHROCYTE [DISTWIDTH] IN BLOOD BY AUTOMATED COUNT: 13.1 % (ref 10–15)
GFR SERPL CREATININE-BSD FRML MDRD: >90 ML/MIN/1.73M2
GLUCOSE BLD-MCNC: 125 MG/DL (ref 70–99)
HCT VFR BLD AUTO: 41.9 % (ref 35–47)
HGB BLD-MCNC: 13.5 G/DL (ref 11.7–15.7)
IMM GRANULOCYTES # BLD: 0 10E3/UL
IMM GRANULOCYTES NFR BLD: 0 %
LYMPHOCYTES # BLD AUTO: 1.6 10E3/UL (ref 0.8–5.3)
LYMPHOCYTES NFR BLD AUTO: 20 %
MCH RBC QN AUTO: 29.4 PG (ref 26.5–33)
MCHC RBC AUTO-ENTMCNC: 32.2 G/DL (ref 31.5–36.5)
MCV RBC AUTO: 91 FL (ref 78–100)
MONOCYTES # BLD AUTO: 0.5 10E3/UL (ref 0–1.3)
MONOCYTES NFR BLD AUTO: 6 %
NEUTROPHILS # BLD AUTO: 5.8 10E3/UL (ref 1.6–8.3)
NEUTROPHILS NFR BLD AUTO: 73 %
PLATELET # BLD AUTO: 356 10E3/UL (ref 150–450)
POTASSIUM BLD-SCNC: 3.9 MMOL/L (ref 3.4–5.3)
PROT SERPL-MCNC: 7.1 G/DL (ref 6.8–8.8)
RBC # BLD AUTO: 4.59 10E6/UL (ref 3.8–5.2)
SODIUM SERPL-SCNC: 137 MMOL/L (ref 133–144)
TSH SERPL DL<=0.005 MIU/L-ACNC: 1.07 MU/L (ref 0.4–4)
VIT B12 SERPL-MCNC: 974 PG/ML (ref 232–1245)
WBC # BLD AUTO: 7.9 10E3/UL (ref 4–11)

## 2023-03-15 PROCEDURE — 82306 VITAMIN D 25 HYDROXY: CPT | Performed by: PREVENTIVE MEDICINE

## 2023-03-15 PROCEDURE — 36415 COLL VENOUS BLD VENIPUNCTURE: CPT | Performed by: PREVENTIVE MEDICINE

## 2023-03-15 PROCEDURE — 82607 VITAMIN B-12: CPT | Performed by: PREVENTIVE MEDICINE

## 2023-03-15 PROCEDURE — 99214 OFFICE O/P EST MOD 30 MIN: CPT | Performed by: PREVENTIVE MEDICINE

## 2023-03-15 PROCEDURE — 80050 GENERAL HEALTH PANEL: CPT | Performed by: PREVENTIVE MEDICINE

## 2023-03-15 ASSESSMENT — PAIN SCALES - GENERAL: PAINLEVEL: MODERATE PAIN (4)

## 2023-03-15 NOTE — PROGRESS NOTES
"  Assessment & Plan     LUQ abdominal pain  -await lab  -if labs are normal then proceed with US abdomen   - REVIEW OF HEALTH MAINTENANCE PROTOCOL ORDERS  - CBC with Platelets & Differential  - Comprehensive metabolic panel    -ER precautions reviewed in detail. If increased abdominal pain, fever over 101 F, emesis, rectal bleeding, melena, then needs to be seen in ER    Other fatigue  -await labs   - CBC with Platelets & Differential  - Comprehensive metabolic panel  - TSH with free T4 reflex  - Vitamin D Deficiency  - Vitamin B12    Morbid obesity (H)  -  Wt Readings from Last 2 Encounters:   03/15/23 126.1 kg (278 lb)   09/22/22 124.3 kg (274 lb)       Acute sinusitis with symptoms > 10 days  -symptoms for almost 6 weeks  -hydration and monitor temperature  -Fluticasone nasal spray over the counter   - amoxicillin-clavulanate (AUGMENTIN) 875-125 MG tablet  Dispense: 14 tablet; Refill: 0      Ordering of each unique test  Prescription drug management  20 minutes spent on the date of the encounter doing chart review, history and exam, documentation and further activities per the note       BMI:   Estimated body mass index is 43.54 kg/m  as calculated from the following:    Height as of this encounter: 1.702 m (5' 7\").    Weight as of this encounter: 126.1 kg (278 lb).       Return in about 1 week (around 3/22/2023) if symptoms worsen or fail to improve.    Rosa Maria Mcconnell MD MPH    [facility] is a 38 year old, presenting for the following health issues:  URI and Pain (Around splean area )      URI    Pain    History of Present Illness       Reason for visit:  Ongoing cold symptoms, recurring for past 6+ weeks  Symptom onset:  More than a month  Symptoms include:  Sore throat, congestion, sinus pressure, fatigue, loss of voice, dull pain in spleen area noticeable when laying down  Symptom intensity:  Moderate  Symptom progression:  Staying the same  Had these symptoms " "before:  No    She eats 4 or more servings of fruits and vegetables daily.She consumes 0 sweetened beverage(s) daily.She exercises with enough effort to increase her heart rate 9 or less minutes per day.  She exercises with enough effort to increase her heart rate 4 days per week.   She is taking medications regularly.       Has been doing some Core exercises  Dull throbbing pain in the left upper abdomen  Daughter has also been sick  Fatigue+  No fever    Per E visit:    \" Initially started as mild symptoms (fatigue, dry cough, sniffly nose), days 1-4. On day 5 I had a stronger cough and wheezing that I noticed at night, and what felt like laryngitis for ~24 hours that made it difficult to speak. Cough felt wetter at this point and focused on chest area. No pain while breathing or shortness of breath, but felt like a lot of phlegm in that area. By day 8 I was able to speak better but mucous build up in sinuses increased with a lot of sinus pressure being experienced there and stuffy nose. Wheezing and urge to cough frequently per hour continues to be experienced. No pain when I take deep breaths or other chest pains, just a lot of sinus pressure, wheezing, and urge to cough and clear my throat. Phelgm ranges from clear to dark yellow-green like color. Sometimes its runny, other times seems more thick. I'm currently on day 10 of experiencing these symptoms. Took an at-home COVID test on day 6, was negative.\"    Pain History:  When did you first notice your pain? - 4 days agao  Have you seen anyone else for your pain? No  Where in your body do you have pain? Abdominal/Flank Pain  Onset/Duration: 4 days  Description:   Character: Dull ache  Location: left flank  Radiation: None and side front rib area  Intensity: moderate  Progression of Symptoms:  same  Accompanying Signs & Symptoms:  Fever/Chills: No  Gas/Bloating: No  Nausea: No  Vomitting: No  Diarrhea: No  Constipation: No  Dysuria or Hematuria: No  History: " "  Trauma: No  Previous similar pain: No  Previous tests done: none    Patient's last menstrual period was 02/22/2023.      Review of Systems   Constitutional, HEENT, cardiovascular, pulmonary, gi and gu systems are negative, except as otherwise noted.      Objective    /75   Pulse 91   Temp 98.1  F (36.7  C) (Oral)   Resp 14   Ht 1.702 m (5' 7\")   Wt 126.1 kg (278 lb)   LMP 02/22/2023   SpO2 98%   Breastfeeding No   BMI 43.54 kg/m    Body mass index is 43.54 kg/m .  Physical Exam   GENERAL APPEARANCE: healthy, alert and no distress  EYES: Eyes grossly normal to inspection and conjunctivae and sclerae normal  HENT: nose and mouth without ulcers or lesions, intact TMs, minimal tenderness over maxillary sinuses   NECK: no adenopathy and trachea midline and normal to palpation  RESP: lungs clear to auscultation - no rales, rhonchi or wheezes  CV: regular rates and rhythm, normal S1 S2  ABDOMEN: soft and no rebound or guarding, tender+ LUQ  SKIN: no suspicious lesions or rashes  NEURO: Normal strength and tone, mentation intact and speech normal  PSYCH: mentation appears normal      No results found for this or any previous visit (from the past 24 hour(s)).              "

## 2023-03-15 NOTE — RESULT ENCOUNTER NOTE
Alejandra,     Electrolytes, non fasting glucose, kidney function and liver function tests are normal.   Vitamin D and Vitamin B 12 levels are normal.  Thyroid function is normal.    Please do not hesitate to call us at [phone] if you have any questions or concerns.    Thank you,    Rosa Maria Mcconnell MD MPH

## 2023-03-15 NOTE — PATIENT INSTRUCTIONS
At [facility], we strive to deliver an exceptional experience to you, every time we see you. If you receive a survey, please complete it as we do value your feedback.  If you have MyChart, you can expect to receive results automatically within 24 hours of their completion.  Your provider will send a note interpreting your results as well.   If you do not have MyChart, you should receive your results in about a week by mail.    Your care team:                            Family Medicine Internal Medicine   MD Rajendra Escobar MD Shantel Branch-Fleming, MD Srinivasa Vaka, MD Katya Belousova, PAGLORIA Parrish CNP, MD ([facility]   Pepe Castro, MD Jeannine Fang MD Amelia Massimini APRN VINNIE Rose APRN MD Td Candelaria MD          Clinic hours: Monday - Thursday 7 am-6 pm; Fridays 7 am-5 pm.   Urgent care: Monday - Friday 10 am- 8 pm; Saturday and Sunday 9 am-5 pm.    Clinic: [phone]       [facility]: Monday - Thursday 8 am - 7 pm; Friday 8 am - 6 pm  [facility]: [phone]

## 2023-03-15 NOTE — RESULT ENCOUNTER NOTE
Alejandra,     Basic blood count is not showing any anemia or infection.  Other labs are pending.     Please do not hesitate to call us at [phone] if you have any questions or concerns.    Thank you,    Roas Maria Mcconnell MD MPH

## 2023-03-17 ENCOUNTER — MYC MEDICAL ADVICE (OUTPATIENT)
Dept: FAMILY MEDICINE | Facility: CLINIC | Age: 38
End: 2023-03-17
Payer: COMMERCIAL

## 2023-03-17 DIAGNOSIS — R10.12 LUQ ABDOMINAL PAIN: Primary | ICD-10-CM

## 2023-03-21 ENCOUNTER — HOSPITAL ENCOUNTER (OUTPATIENT)
Dept: ULTRASOUND IMAGING | Facility: CLINIC | Age: 38
Discharge: HOME | End: 2023-03-21
Attending: PREVENTIVE MEDICINE | Admitting: PREVENTIVE MEDICINE
Payer: COMMERCIAL

## 2023-03-21 DIAGNOSIS — R10.12 LUQ ABDOMINAL PAIN: ICD-10-CM

## 2023-03-21 PROCEDURE — 76700 US EXAM ABDOM COMPLETE: CPT

## 2023-03-21 NOTE — RESULT ENCOUNTER NOTE
Alejandra,     The ultrasound is not showing any acute abnormalities.  Fat deposits in the liver seen, regular exercise, a balanced diet and weight loss will help improve this.  Possible splenule seen, this is when additional spleen tissue is found outside the spleen. This is not worrisome, it is a benign condition that does not produce any symptoms.     Please do not hesitate to call us at [phone] if you have any questions or concerns.    Thank you,    Rosa Maria Mcconnell MD MPH

## 2023-03-30 ENCOUNTER — MYC MEDICAL ADVICE (OUTPATIENT)
Dept: FAMILY MEDICINE | Facility: CLINIC | Age: 38
End: 2023-03-30
Payer: COMMERCIAL

## 2023-05-15 ENCOUNTER — OFFICE VISIT (OUTPATIENT)
Dept: ORTHOPEDICS | Facility: CLINIC | Age: 38
End: 2023-05-15
Payer: COMMERCIAL

## 2023-05-15 ENCOUNTER — ANCILLARY PROCEDURE (OUTPATIENT)
Dept: GENERAL RADIOLOGY | Facility: CLINIC | Age: 38
End: 2023-05-15
Attending: PEDIATRICS
Payer: COMMERCIAL

## 2023-05-15 VITALS
BODY MASS INDEX: 44.89 KG/M2 | WEIGHT: 286 LBS | SYSTOLIC BLOOD PRESSURE: 136 MMHG | HEIGHT: 67 IN | DIASTOLIC BLOOD PRESSURE: 82 MMHG

## 2023-05-15 DIAGNOSIS — M25.561 ACUTE PAIN OF RIGHT KNEE: ICD-10-CM

## 2023-05-15 DIAGNOSIS — M25.561 ACUTE PAIN OF RIGHT KNEE: Primary | ICD-10-CM

## 2023-05-15 DIAGNOSIS — M22.2X1 PATELLOFEMORAL PAIN SYNDROME OF RIGHT KNEE: ICD-10-CM

## 2023-05-15 PROCEDURE — 99203 OFFICE O/P NEW LOW 30 MIN: CPT | Performed by: PEDIATRICS

## 2023-05-15 PROCEDURE — 73562 X-RAY EXAM OF KNEE 3: CPT | Mod: TC | Performed by: RADIOLOGY

## 2023-05-15 NOTE — Clinical Note
5/15/2023         RE: Alejandra Moss  [address]  [facility] [address]        Dear Colleague,    Thank you for referring your patient, Alejandra Moss, to the [facility]. Please see a copy of my visit note below.    ASSESSMENT & PLAN    Alejandra was seen today for pain.    Diagnoses and all orders for this visit:    Acute pain of right knee  -     XR Knee Standing AP [place] Bilat Lat Right; Future        See Patient Instructions  Patient Instructions   Acute right knee pain most consistent with patellofemoral source.  Given some of the clicking, it is possible there is some mild, early degenerative change in the patellofemoral compartment.  Otherwise, x-rays today are reassuring.  Clinical exam also reassuring, and that there is no obvious laxity on exam, nothing obviously torn.  We discussed symptom management, activity modification, options also with imaging, rehab, compression/support.  Following discussion, simple home exercises reviewed today, given acute issue.  Formal physical therapy may be future consideration.  No clear indication for MRI right now, but if persistent symptoms, or any worsening or changing symptoms, we could reconsider.  Otherwise, plan to monitor course 1 to 2 weeks with home exercises to start.    If you have any further questions for your physician or physician s care team you can contact them thru [employer] or by calling  [phone] and use option 3 to leave a voice message.   Messages received during business hours will be returned same day.          Kendrick Anguiano DO  [facility]    -----  Chief Complaint   Patient presents with     Right Knee - Pain       SUBJECTIVE  Alejandra Moss is a/an 38 year old female who is seen as a self referral for evaluation of Right knee pain.     The patient is seen with their .    Onset: 1 day(s) ago. Reports insidious onset without acute precipitating event.  "Reports that the outside knee started bothering her after lawn mowing yesterday. Reports stiffness and pain with initial movement. No pain at rest. Lifting and letting the lower leg dangle hurts a lot. Reports hearing popping which is painful.  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 Thought perhaps initially calf strain.  Feels like if upright and walking generally ok. Pain is more with active knee motion while seated, more at end of motion (flexion).  With mowing, had been walking on side hill/incline. No specific injury recalled.  With standing notes stiffness posterior right knee.  Clicking anterior right knee new with this episode.        REVIEW OF SYSTEMS:  Review of Systems    OBJECTIVE:  /82   Ht 1.702 m (5' 7\")   Wt 129.7 kg (286 lb)   BMI 44.79 kg/m           Right Knee exam    Inspection:   mild effusion   no ecchymosis    ROM:      Flexion grossly intact       Extension lacking few degrees actively, full passive       Range of motion limited by pain    Patellar Motion:      Occasional click with extending from flexed position    Tender:      Anterolateral knee, lateral to patellar tendon, mild lateral joint line    Special Tests:      neg (-) Debora       neg (-) Lachman       neg (-) anterior drawer       neg (-) posterior drawer       neg (-) varus       neg (-) valgus       no pain with forced extension    Evaluation of ipsilateral kinetic chain:   Mini squat no pain, mild knee anterior excursion and valgus      RADIOLOGY:  Final results and radiologist's interpretation, available in the [facility] health record.  Images were reviewed with the patient in the office today.  My personal " interpretation of the performed imaging: no acute bony abnormality noted. Joint spaces appear fairly well preserved.      XR Knee Standing AP [place] Bilat Lat Right    Narrative     XR KNEE STANDING AP [place] BILAT LAT RIGHT  5/15/2023 3:06 PM     HISTORY: Acute pain of right knee  COMPARISON: None.      Impression    IMPRESSION: No acute fracture or malalignment. There is normal joint  spacing. No significant right knee joint effusion.    BRANDON APPIAH MD         SYSTEM ID:  [id number]                    ASSESSMENT & PLAN    Alejandra was seen today for pain.    Diagnoses and all orders for this visit:    Acute pain of right knee  -     XR Knee Standing AP [place] Bilat Lat Right; Future    Patellofemoral pain syndrome of right knee  -     Knee Supplies Order Hinged Knee Brace; Right        See Patient Instructions  Patient Instructions   Acute right knee pain most consistent with patellofemoral source.  Given some of the clicking, it is possible there is some mild, early degenerative change in the patellofemoral compartment.  Otherwise, x-rays today are reassuring.  Clinical exam also reassuring, and that there is no obvious laxity on exam, nothing obviously torn.  We discussed symptom management, activity modification, options also with imaging, rehab, compression/support.  Following discussion, simple home exercises reviewed today, given acute issue.  Formal physical therapy may be future consideration.  No clear indication for MRI right now, but if persistent symptoms, or any worsening or changing symptoms, we could reconsider.  Otherwise, plan to monitor course 1 to 2 weeks with home exercises to start.    If you have any further questions for your physician or physician s care team you can contact them thru MyChart or by calling  [phone] and use option 3 to leave a voice message.   Messages received during business hours will be returned same day.          Kendrick Anguiano [facility]  "SP[facility]TS MEDICINE CLINIC [facility]    -----  Chief Complaint   Patient presents with     Right Knee - Pain       SUBJECTIVE  Alejandra Moss is a/an 38 year old female who is seen as a self referral for evaluation of Right knee pain.     The patient is seen with their .    Onset: 1 day(s) ago. Reports insidious onset without acute precipitating event. Reports that the outside knee started bothering her after lawn mowing yesterday. Reports stiffness and pain with initial movement. No pain at rest. Lifting and letting the lower leg dangle hurts a lot. Reports hearing popping which is painful.  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 Thought perhaps initially calf strain.  Feels like if upright and walking generally ok. Pain is more with active knee motion while seated, more at end of motion (flexion).  With mowing, had been walking on side hill/incline. No specific injury recalled.  With standing notes stiffness posterior right knee.  Clicking anterior right knee new with this episode.        REVIEW OF SYSTEMS:  Review of Systems    OBJECTIVE:  /82   Ht 1.702 m (5' 7\")   Wt 129.7 kg (286 lb)   BMI 44.79 kg/m           Right Knee exam    Inspection:   mild effusion   no ecchymosis    ROM:      Flexion grossly intact       Extension lacking few degrees actively, full passive       Range of motion limited by pain    Patellar Motion:      Occasional click with extending from flexed position    Tender:      Anterolateral knee, lateral to patellar tendon, mild lateral joint line    Special Tests:      neg (-) Debora       neg (-) Lachman       neg (-) anterior drawer       neg (-) posterior drawer       neg " (-) varus       neg (-) valgus       no pain with forced extension    Evaluation of ipsilateral kinetic chain:   Mini squat no pain, mild knee anterior excursion and valgus      RADIOLOGY:  Final results and radiologist's interpretation, available in the [facility] health record.  Images were reviewed with the patient in the office today.  My personal interpretation of the performed imaging: no acute bony abnormality noted. Joint spaces appear fairly well preserved.      XR Knee Standing AP [place] Bilat Lat Right    Narrative     XR KNEE STANDING AP [place] BILAT LAT RIGHT  5/15/2023 3:06 PM     HISTORY: Acute pain of right knee  COMPARISON: None.      Impression    IMPRESSION: No acute fracture or malalignment. There is normal joint  spacing. No significant right knee joint effusion.    BRANDON APPIAH MD         SYSTEM ID:  [id number]                      Again, thank you for allowing me to participate in the care of your patient.        Sincerely,        Kendrick Anguiano DO   89 yo female with hx of CHF and HTN presenting with acute worsening of shortness of breath that started around 1 am.  did not take anything for her symptoms.  unable to tell if the shortness of breath is related to exertion or lying down.  hx of being admitted for chf in the past and put on bipap. denies any chest pain.  no recent travel, no sick contacts.     pcp- pascale

## 2023-05-15 NOTE — PATIENT INSTRUCTIONS
Acute right knee pain most consistent with patellofemoral source.  Given some of the clicking, it is possible there is some mild, early degenerative change in the patellofemoral compartment.  Otherwise, x-rays today are reassuring.  Clinical exam also reassuring, and that there is no obvious laxity on exam, nothing obviously torn.  We discussed symptom management, activity modification, options also with imaging, rehab, compression/support.  Following discussion, simple home exercises reviewed today, given acute issue.  Formal physical therapy may be future consideration.  No clear indication for MRI right now, but if persistent symptoms, or any worsening or changing symptoms, we could reconsider.  Otherwise, plan to monitor course 1 to 2 weeks with home exercises to start.    If you have any further questions for your physician or physician s care team you can contact them thru GotVoicehart or by calling  [phone] and use option 3 to leave a voice message.   Messages received during business hours will be returned same day.

## 2023-05-15 NOTE — PROGRESS NOTES
ASSESSMENT & PLAN    Alejandra was seen today for pain.    Diagnoses and all orders for this visit:    Acute pain of right knee  -     XR Knee Standing AP [place] Bilat Lat Right; Future    Patellofemoral pain syndrome of right knee  -     Knee Supplies Order Hinged Knee Brace; Right        See Patient Instructions  Patient Instructions   Acute right knee pain most consistent with patellofemoral source.  Given some of the clicking, it is possible there is some mild, early degenerative change in the patellofemoral compartment.  Otherwise, x-rays today are reassuring.  Clinical exam also reassuring, and that there is no obvious laxity on exam, nothing obviously torn.  We discussed symptom management, activity modification, options also with imaging, rehab, compression/support.  Following discussion, simple home exercises reviewed today, given acute issue.  Formal physical therapy may be future consideration.  No clear indication for MRI right now, but if persistent symptoms, or any worsening or changing symptoms, we could reconsider.  Otherwise, plan to monitor course 1 to 2 weeks with home exercises to start.    If you have any further questions for your physician or physician s care team you can contact them thru MyChart or by calling  [phone] and use option 3 to leave a voice message.   Messages received during business hours will be returned same day.          [facility]    -----  Chief Complaint   Patient presents with     Right Knee - Pain       SUBJECTIVE  Alejandra Moss is a/an 38 year old female who is seen as a self referral for evaluation of Right knee pain.     The patient is seen with their .    Onset: 1 day(s) ago. Reports insidious onset without acute precipitating event. Reports that the outside knee started bothering her after lawn mowing yesterday. Reports stiffness and pain with initial movement. No pain at rest. Lifting and  "letting the lower leg dangle hurts a lot. Reports hearing popping which is painful.  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 Thought perhaps initially calf strain.  Feels like if upright and walking generally ok. Pain is more with active knee motion while seated, more at end of motion (flexion).  With mowing, had been walking on side hill/incline. No specific injury recalled.  With standing notes stiffness posterior right knee.  Clicking anterior right knee new with this episode.        REVIEW OF SYSTEMS:  Review of Systems    OBJECTIVE:  /82   Ht 1.702 m (5' 7\")   Wt 129.7 kg (286 lb)   BMI 44.79 kg/m           Right Knee exam    Inspection:   mild effusion   no ecchymosis    ROM:      Flexion grossly intact       Extension lacking few degrees actively, full passive       Range of motion limited by pain    Patellar Motion:      Occasional click with extending from flexed position    Tender:      Anterolateral knee, lateral to patellar tendon, mild lateral joint line    Special Tests:      neg (-) Debora       neg (-) Lachman       neg (-) anterior drawer       neg (-) posterior drawer       neg (-) varus       neg (-) valgus       no pain with forced extension    Evaluation of ipsilateral kinetic chain:   Mini squat no pain, mild knee anterior excursion and valgus      RADIOLOGY:  Final results and radiologist's interpretation, available in the [facility] health record.  Images were reviewed with the patient in the office today.  My personal interpretation of the performed imaging: no acute bony abnormality noted. Joint spaces appear fairly well preserved.      XR Knee Standing AP [place] Bilat Lat Right "    Narrative     XR KNEE STANDING AP [place] BILAT LAT RIGHT  5/15/2023 3:06 PM     HISTORY: Acute pain of right knee  COMPARISON: None.      Impression    IMPRESSION: No acute fracture or malalignment. There is normal joint  spacing. No significant right knee joint effusion.    BRANDON APPIAH MD         SYSTEM ID:  [id number]

## 2023-07-31 ENCOUNTER — OFFICE VISIT (OUTPATIENT)
Dept: PODIATRY | Facility: CLINIC | Age: 38
End: 2023-07-31
Payer: COMMERCIAL

## 2023-07-31 ENCOUNTER — ANCILLARY PROCEDURE (OUTPATIENT)
Dept: GENERAL RADIOLOGY | Facility: CLINIC | Age: 38
End: 2023-07-31
Attending: PODIATRIST
Payer: COMMERCIAL

## 2023-07-31 VITALS
HEIGHT: 67 IN | BODY MASS INDEX: 44.89 KG/M2 | DIASTOLIC BLOOD PRESSURE: 82 MMHG | TEMPERATURE: 97.6 F | WEIGHT: 286 LBS | SYSTOLIC BLOOD PRESSURE: 132 MMHG

## 2023-07-31 DIAGNOSIS — M76.71 PERONEAL TENDINITIS, RIGHT: Primary | ICD-10-CM

## 2023-07-31 DIAGNOSIS — M72.2 PLANTAR FASCIITIS, RIGHT: ICD-10-CM

## 2023-07-31 PROCEDURE — 73630 X-RAY EXAM OF FOOT: CPT | Mod: TC | Performed by: RADIOLOGY

## 2023-07-31 PROCEDURE — 99203 OFFICE O/P NEW LOW 30 MIN: CPT | Performed by: PODIATRIST

## 2023-07-31 RX ORDER — DICLOFENAC SODIUM 75 MG/1
75 TABLET, DELAYED RELEASE ORAL 2 TIMES DAILY
Qty: 60 TABLET | Refills: 1 | Status: SHIPPED | OUTPATIENT
Start: 2023-07-31 | End: 2024-01-31

## 2023-07-31 ASSESSMENT — PAIN SCALES - GENERAL: PAINLEVEL: MILD PAIN (3)

## 2023-07-31 NOTE — PROGRESS NOTES
"HPI:  Alejandra Moss is a 38 year old female who is seen in consultation at the request of pain on arch of right foot.    No ref. provider found     Pt presents for eval of:   (Onset, Location, L/R, Character, Treatments, Injury if yes)     Right arch foot pain - lateral side on right foot.  Jogs once a week.  Pain is 3/10 sometimes at night is 9/10.  Sometimes wakes her at night.  Feels stiff. Sharp aching pain that feels better to get it stretched.  Barefoot in home.      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    Weight management plan: Patient was referred to their PCP to discuss a diet and exercise plan.    ROS: 10 point ROS neg other than the symptoms noted above in the HPI.    Patient Active Problem List   Diagnosis    Family history of DVT    Short cervix affecting pregnancy    Cervical insufficiency during pregnancy in second trimester, antepartum    Hx of incompetent cervix, currently pregnant, third trimester    Encounter for triage in pregnant patient    Morbid obesity (H)       PAST MEDICAL HISTORY:   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    Obesity     Tonsillitis      PAST SURGICAL HISTORY:   Past Surgical History:   Procedure Laterality Date    ABDOMEN SURGERY      premature, \"intestines not fully developed\"  age 4 y/o    CERCLAGE CERVICAL N/A 9/4/2019    Procedure: CERCLAGE, CERVIX, VAGINAL APPROACH;  Surgeon: Cathryn Bermudez MD;  Location: [facility]    LAPAROTOMY EXPLORATORY      CT LAP,DIAGNOSTIC ABDOMEN Bilateral 3/19/2019    Procedure: LAPAROSCOPY WITH LYSIS OF ADHESIONS;  Surgeon: Yesika Light MD;  Location: [facility];  Service: Gynecology    SMALL BOWEL RESECTION      TONSILLECTOMY      TONSILLECTOMY, ADENOIDECTOMY ADULT, COMBINED  9/26/2013    Procedure: COMBINED TONSILLECTOMY, ADENOIDECTOMY ADULT;  TONSILLECTOMY ;  Surgeon: Prateek Sifuentes MD;  Location: [facility]" WRIST SURGERY       MEDICATIONS:   Current Outpatient Medications:     diclofenac (VOLTAREN) 75 MG EC tablet, Take 1 tablet (75 mg) by mouth 2 times daily, Disp: 60 tablet, Rfl: 1    Prenatal Vit-Fe Fumarate-FA (PRENATAL MULTIVITAMIN W/IRON) 27-0.8 MG tablet, Take 1 tablet by mouth daily, Disp: , Rfl:     vitamin D3 (CHOLECALCIFEROL) 2000 units (50 mcg) tablet, Take 1 tablet by mouth daily, Disp: , Rfl:   ALLERGIES:  No Known Allergies  SOCIAL HISTORY:   Social History     Socioeconomic History    Marital status:      Spouse name: Not on file    Number of children: 1    Years of education: Not on file    Highest education level: Not on file   Occupational History    Not on file   Tobacco Use    Smoking status: Never    Smokeless tobacco: Never   Vaping Use    Vaping Use: Never used   Substance and Sexual Activity    Alcohol use: Yes     Alcohol/week: 1.7 standard drinks of alcohol     Comment: Alcoholic Drinks/day: 1-2drinks per week    Drug use: No    Sexual activity: Yes     Partners: Male     Birth control/protection: None   Other Topics Concern    Parent/sibling w/ CABG, MI or angioplasty before 65F 55M? Yes     Comment: mother   Social History Narrative    ** Merged History Encounter **          Social Determinants of Health     Financial Resource Strain: Not on file   Food Insecurity: Not on file   Transportation Needs: Not on file   Physical Activity: Not on file   Stress: Not on file   Social Connections: Not on file   Intimate Partner Violence: Not on file   Housing Stability: Not on file     FAMILY HISTORY:   Family History   Problem Relation Age of Onset    Diabetes Mother     Depression Mother     Lupus Mother     Clotting Disorder Mother     Heart Disease Father     Diabetes Father     Hypertension Father     Heart Disease Maternal Aunt     Obesity Maternal Aunt     Pancreatic Cancer Maternal Grandmother     Heart Disease Maternal Grandfather     Cancer Maternal Grandfather     Clotting Disorder  "Sister     Lupus Sister        EXAM:Vitals: /82 (BP Location: Left arm, Patient Position: Sitting, Cuff Size: Adult Large)   Temp 97.6  F (36.4  C) (Temporal)   Ht 1.702 m (5' 7\")   Wt 129.7 kg (286 lb)   BMI 44.79 kg/m    BMI= Body mass index is 44.79 kg/m .    General appearance: Patient is alert and fully cooperative with history & exam.  No sign of distress is noted during the visit.     Psychiatric: Affect is pleasant & appropriate.  Patient appears motivated to improve health.     Respiratory: Breathing is regular & unlabored while sitting.     HEENT: Hearing is intact to spoken word.  Speech is clear.  No gross evidence of visual impairment that would impact ambulation.     Vascular: DP & PT 2/4 & regular bilaterally.  No significant edema, rubor or varicosities noted.  CFT and skin temperature is normal to both lower extremities.       Neurologic: Lower extremity sensation is intact to light touch.  No evidence of weakness in the lower extremities.  No evidence of neuropathy and negative tinel sign.     Dermatologic: Skin is intact to both lower extremities without significant lesions, rash or abrasion.  Normal texture turgor and tone. No paronychia or evidence of soft tissue infection is noted.    Musculoskeletal: Patient is ambulatory without assistive device or brace.  Most discomfort is noted with firm palpation along the peroneal tendons from the fifth metatarsal base to the distal fibula.  Very subtle discomfort along the plantar fascia medial band most of this is with peroneal tendinitis laterally.  No peroneal subluxation.  No pain with compression of the calcaneus medial to lateral or with palpation of the achilles, peroneal or posterior tibial tendons.  Slightly more than 0  of ankle joint dorsiflexion without crepitus or pain throughout the ankle, subtalar or midtarsal joints.  No pain or limitations throughout manual muscle strength testing plus 5/5 to all four quadrants bilateral.  No " palpable edema noted.      Radiographs:  Osteophyte noted about the plantar calcaneus.  Also os peroneum accessory ossicle is noted about the peroneal longus and the peroneal groove lateral to the cuboid.     ASSESSMENT:       ICD-10-CM    1. Peroneal tendinitis, right  M76.71           PLAN:  Reviewed patient's chart in [facility] and discussed etiology and treatment options.      Treatments:  7/31/2023  Obtained and interpreted radiographs.   Discontinue barefoot walking or unsupported walking in shoes without shank.  Dispensed written instructions to obtain appropriate shoe gear and/or OTC inserts.    Order to begin physical therapy  Prescription oral Voltaren for a short course. Discussed risks.  Prescription for custom molded orthotics 7/31/2023.  Patient may wait on the orthotics to see if simply adding shoe gear into her home as she mostly works from home is adequate to provide stability.  The order was placed for the orthotic however.    Follow up in 4-5 weeks still painful.  Motivational interviewing regarding training techniques to create less overuse injury as we age.        Kendrick Moon DPM

## 2023-07-31 NOTE — PATIENT INSTRUCTIONS
Reliable shoe stores: To maximize your experience and provide the best possible fit.  Be sure to show them your foot concerns and tell them Dr. Moon sent you.      Stores listed in bold have only athletic shoes, and stores that are not bold are mostly casual or variety of shoes    [employer]  [address] [place] [address]  [phone]    [employer] [place]  [address] [place] [address]  [phone]     [employer]  [address] [place] [address]  [phone]    [employer]  [address] [facility] [address]  [phone]    [employer]  [address] [place], [place] [address] [phone]    [employer]  [address] [place] [address]  [phone]                         [employer] Locations:     [address] [place] [address]   [phone]     [address] [place] [address] [address] [place] [address]   [phone]     [address] [place] [address]   [phone]     [address] [facility] [place] [address]   [phone]     [address] [place] [address]   [phone]     [address] [place] [address]   [phone]     [address] [facility] [facility] [address]   [place] [address]   [phone]     [address] [facility]   [place] [address]   [phone]             How to find reasonable shoes          The correct width    Correct Fitting    Correct Length      Foot Distortion    Posture Distortion                          Torsional Rigidity      Grasp behind the heel and underneath the foot and twist      Bad    Excessive torsion/twist in midfoot     Less torsion/twist in midfoot is better                   Heel Counter Rigidity      Grasp just above   midsole and squeeze      Bad    Soft heel counter      Good    Rigid Heel Counter      Flexion Rigidity      Grasp shoe and bend from forefoot to rearfoot

## 2023-07-31 NOTE — LETTER
"    7/31/2023         RE: Alejandra Moss  [address]  [facility] [address]        Dear Colleague,    Thank you for referring your patient, Alejandra Moss, to the [facility]. Please see a copy of my visit note below.    HPI:  Alejandra Moss is a 38 year old female who is seen in consultation at the request of pain on arch of right foot.    No ref. provider found     Pt presents for eval of:   (Onset, Location, L/R, Character, Treatments, Injury if yes)     Right arch foot pain - lateral side on right foot.  Jogs once a week.  Pain is 3/10 sometimes at night is 9/10.  Sometimes wakes her at night.  Feels stiff. Sharp aching pain that feels better to get it stretched.  Barefoot in home.      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    Weight management plan: Patient was referred to their PCP to discuss a diet and exercise plan.    ROS: 10 point ROS neg other than the symptoms noted above in the HPI.    Patient Active Problem List   Diagnosis     Family history of DVT     Short cervix affecting pregnancy     Cervical insufficiency during pregnancy in second trimester, antepartum     Hx of incompetent cervix, currently pregnant, third trimester     Encounter for triage in pregnant patient     Morbid obesity (H)       PAST MEDICAL HISTORY:   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     Obesity      Tonsillitis      PAST SURGICAL HISTORY:   Past Surgical History:   Procedure Laterality Date     ABDOMEN SURGERY      premature, \"intestines not fully developed\"  age 4 y/o     CERCLAGE CERVICAL N/A 9/4/2019    Procedure: CERCLAGE, CERVIX, VAGINAL APPROACH;  Surgeon: Cathryn Bermudez MD;  Location: [facility]     LAPAROTOMY EXPLORATORY       FL LAP,DIAGNOSTIC ABDOMEN Bilateral 3/19/2019    Procedure: LAPAROSCOPY WITH LYSIS OF ADHESIONS;  Surgeon: Yesika Light MD;  Location: [facility]" Main OR;  Service: Gynecology     SMALL BOWEL RESECTION       TONSILLECTOMY       TONSILLECTOMY, ADENOIDECTOMY ADULT, COMBINED  9/26/2013    Procedure: COMBINED TONSILLECTOMY, ADENOIDECTOMY ADULT;  TONSILLECTOMY ;  Surgeon: Prateek Sifuentes MD;  Location: [facility]     WRIST SURGERY       MEDICATIONS:   Current Outpatient Medications:      diclofenac (VOLTAREN) 75 MG EC tablet, Take 1 tablet (75 mg) by mouth 2 times daily, Disp: 60 tablet, Rfl: 1     Prenatal Vit-Fe Fumarate-FA (PRENATAL MULTIVITAMIN W/IRON) 27-0.8 MG tablet, Take 1 tablet by mouth daily, Disp: , Rfl:      vitamin D3 (CHOLECALCIFEROL) 2000 units (50 mcg) tablet, Take 1 tablet by mouth daily, Disp: , Rfl:   ALLERGIES:  No Known Allergies  SOCIAL HISTORY:   Social History     Socioeconomic History     Marital status:      Spouse name: Not on file     Number of children: 1     Years of education: Not on file     Highest education level: Not on file   Occupational History     Not on file   Tobacco Use     Smoking status: Never     Smokeless tobacco: Never   Vaping Use     Vaping Use: Never used   Substance and Sexual Activity     Alcohol use: Yes     Alcohol/week: 1.7 standard drinks of alcohol     Comment: Alcoholic Drinks/day: 1-2drinks per week     Drug use: No     Sexual activity: Yes     Partners: Male     Birth control/protection: None   Other Topics Concern     Parent/sibling w/ CABG, MI or angioplasty before 65F 55M? Yes     Comment: mother   Social History Narrative    ** Merged History Encounter **          Social Determinants of Health     Financial Resource Strain: Not on file   Food Insecurity: Not on file   Transportation Needs: Not on file   Physical Activity: Not on file   Stress: Not on file   Social Connections: Not on file   Intimate Partner Violence: Not on file   Housing Stability: Not on file     FAMILY HISTORY:   Family History   Problem Relation Age of Onset     Diabetes Mother      Depression Mother      Lupus Mother       "Clotting Disorder Mother      Heart Disease Father      Diabetes Father      Hypertension Father      Heart Disease Maternal Aunt      Obesity Maternal Aunt      Pancreatic Cancer Maternal Grandmother      Heart Disease Maternal Grandfather      Cancer Maternal Grandfather      Clotting Disorder Sister      Lupus Sister        EXAM:Vitals: /82 (BP Location: Left arm, Patient Position: Sitting, Cuff Size: Adult Large)   Temp 97.6  F (36.4  C) (Temporal)   Ht 1.702 m (5' 7\")   Wt 129.7 kg (286 lb)   BMI 44.79 kg/m    BMI= Body mass index is 44.79 kg/m .    General appearance: Patient is alert and fully cooperative with history & exam.  No sign of distress is noted during the visit.     Psychiatric: Affect is pleasant & appropriate.  Patient appears motivated to improve health.     Respiratory: Breathing is regular & unlabored while sitting.     HEENT: Hearing is intact to spoken word.  Speech is clear.  No gross evidence of visual impairment that would impact ambulation.     Vascular: DP & PT 2/4 & regular bilaterally.  No significant edema, rubor or varicosities noted.  CFT and skin temperature is normal to both lower extremities.       Neurologic: Lower extremity sensation is intact to light touch.  No evidence of weakness in the lower extremities.  No evidence of neuropathy and negative tinel sign.     Dermatologic: Skin is intact to both lower extremities without significant lesions, rash or abrasion.  Normal texture turgor and tone. No paronychia or evidence of soft tissue infection is noted.    Musculoskeletal: Patient is ambulatory without assistive device or brace.  Most discomfort is noted with firm palpation along the peroneal tendons from the fifth metatarsal base to the distal fibula.  Very subtle discomfort along the plantar fascia medial band most of this is with peroneal tendinitis laterally.  No peroneal subluxation.  No pain with compression of the calcaneus medial to lateral or with " palpation of the achilles, peroneal or posterior tibial tendons.  Slightly more than 0  of ankle joint dorsiflexion without crepitus or pain throughout the ankle, subtalar or midtarsal joints.  No pain or limitations throughout manual muscle strength testing plus 5/5 to all four quadrants bilateral.  No palpable edema noted.      Radiographs:  Osteophyte noted about the plantar calcaneus.  Also os peroneum accessory ossicle is noted about the peroneal longus and the peroneal groove lateral to the cuboid.     ASSESSMENT:       ICD-10-CM    1. Peroneal tendinitis, right  M76.71           PLAN:  Reviewed patient's chart in [facility] and discussed etiology and treatment options.      Treatments:  7/31/2023  Obtained and interpreted radiographs.   Discontinue barefoot walking or unsupported walking in shoes without shank.  Dispensed written instructions to obtain appropriate shoe gear and/or OTC inserts.    Order to begin physical therapy  Prescription oral Voltaren for a short course. Discussed risks.  Prescription for custom molded orthotics 7/31/2023.  Patient may wait on the orthotics to see if simply adding shoe gear into her home as she mostly works from home is adequate to provide stability.  The order was placed for the orthotic however.    Follow up in 4-5 weeks still painful.  Motivational interviewing regarding training techniques to create less overuse injury as we age.        Kendrick Moon DPM        Again, thank you for allowing me to participate in the care of your patient.        Sincerely,        Kendrick Moon DPM

## 2023-08-13 ENCOUNTER — OFFICE VISIT (OUTPATIENT)
Dept: URGENT CARE | Facility: URGENT CARE | Age: 38
End: 2023-08-13
Payer: COMMERCIAL

## 2023-08-13 VITALS
HEART RATE: 85 BPM | OXYGEN SATURATION: 97 % | BODY MASS INDEX: 43.7 KG/M2 | DIASTOLIC BLOOD PRESSURE: 98 MMHG | SYSTOLIC BLOOD PRESSURE: 143 MMHG | RESPIRATION RATE: 16 BRPM | TEMPERATURE: 97.6 F | WEIGHT: 279 LBS

## 2023-08-13 DIAGNOSIS — H92.03 OTALGIA, BILATERAL: Primary | ICD-10-CM

## 2023-08-13 PROCEDURE — 99213 OFFICE O/P EST LOW 20 MIN: CPT | Performed by: FAMILY MEDICINE

## 2023-08-13 NOTE — PROGRESS NOTES
Assessment & Plan     Otalgia, bilateral  Differential discussed in detail including but not limited to serous otitis media and eustachian tube dysfunction.  Recommended well hydration, warm fluids, over-the-counter antihistamine, oral analgesia.  Return criteria explained in detail.  All questions answered.      Contreras Hobbs MD  [facility]    Rebecca Roberts is a 38 year old, presenting for the following health issues:  Ear Problem    HPI   Bilateral ear pain, pt had tonsillectomy in 2015, no fever, does feel lymph nodes under jawline line is enlarged.  No fever, chills, sore throat or other abdomen systemic symptoms      Review of Systems   Constitutional, HEENT, cardiovascular, pulmonary, gi and gu systems are negative, except as otherwise noted.      Objective    BP (!) 143/98   Pulse 85   Temp 97.6  F (36.4  C) (Tympanic)   Resp 16   Wt 126.6 kg (279 lb)   SpO2 97%   BMI 43.70 kg/m    Body mass index is 43.7 kg/m .  Physical Exam   GENERAL: healthy, alert, and no distress  EYES: Eyes grossly normal to inspection, PERRL and conjunctivae and sclerae normal  HENT: normal cephalic/atraumatic, right ear: clear effusion, left ear: clear effusion, nose and mouth without ulcers or lesions, oropharynx clear, and oral mucous membranes moist  NECK: no adenopathy, no asymmetry, masses, or scars and thyroid normal to palpation  RESP: lungs clear to auscultation - no rales, rhonchi or wheezes  CV: regular rate and rhythm, normal S1 S2, no S3 or S4, no murmur, click or rub, no peripheral edema and peripheral pulses strong  MS: no gross musculoskeletal defects noted, no edema  NEURO: Normal strength and tone, mentation intact and speech normal  PSYCH: mentation appears normal, affect normal/bright

## 2023-08-23 ENCOUNTER — PATIENT OUTREACH (OUTPATIENT)
Dept: CARE COORDINATION | Facility: CLINIC | Age: 38
End: 2023-08-23
Payer: COMMERCIAL

## 2023-09-06 ENCOUNTER — PATIENT OUTREACH (OUTPATIENT)
Dept: CARE COORDINATION | Facility: CLINIC | Age: 38
End: 2023-09-06
Payer: COMMERCIAL

## 2023-10-09 ENCOUNTER — IMMUNIZATION (OUTPATIENT)
Dept: NURSING | Facility: CLINIC | Age: 38
End: 2023-10-09
Payer: COMMERCIAL

## 2023-10-09 PROCEDURE — 91320 SARSCV2 VAC 30MCG TRS-SUC IM: CPT

## 2023-10-09 PROCEDURE — 90471 IMMUNIZATION ADMIN: CPT

## 2023-10-09 PROCEDURE — 90480 ADMN SARSCOV2 VAC 1/ONLY CMP: CPT

## 2023-10-09 PROCEDURE — 90686 IIV4 VACC NO PRSV 0.5 ML IM: CPT

## 2023-10-12 ENCOUNTER — THERAPY VISIT (OUTPATIENT)
Dept: PHYSICAL THERAPY | Facility: CLINIC | Age: 38
End: 2023-10-12
Attending: PODIATRIST
Payer: COMMERCIAL

## 2023-10-12 DIAGNOSIS — M72.2 PLANTAR FASCIITIS, RIGHT: Primary | ICD-10-CM

## 2023-10-12 PROCEDURE — 97161 PT EVAL LOW COMPLEX 20 MIN: CPT | Mod: GP | Performed by: PHYSICAL THERAPIST

## 2023-10-12 PROCEDURE — 97110 THERAPEUTIC EXERCISES: CPT | Mod: GP | Performed by: PHYSICAL THERAPIST

## 2023-10-12 NOTE — PROGRESS NOTES
PHYSICAL THERAPY EVALUATION  Type of Visit: Evaluation    See electronic medical record for Abuse and Falls Screening details.    Subjective       Presenting condition or subjective complaint: Pt presents to physical therapy with plantar faciitis diagnosis in August 2023. Pt currently is no longer experiencing pain in the foot or ankle but does experience weakness in the ankle joint following injury along with up the chain in the knee and hip. Pt notes that she has overall felt that she has decreased in strength and is unable to perform all activities she would like to specifically pertaining to playing with her child and remaining active.  Date of onset: 07/01/23    Relevant medical history:     Dates & types of surgery:      Prior diagnostic imaging/testing results: X-ray     Prior therapy history for the same diagnosis, illness or injury: No      Prior Level of Function  Transfers: Independent  Ambulation: Independent  ADL: Independent  IADL:     Living Environment  Social support: With a significant other or spouse   Type of home: House; Multi-level   Stairs to enter the home: Yes 5 Is there a railing: Yes   Ramp: No   Stairs inside the home: Yes 6 Is there a railing: Yes   Help at home: None  Equipment owned:       Employment: Yes    Hobbies/Interests:      Patient goals for therapy: Pt would like to be able play with her 3 1/1 yo child and participate in yoga and pilates.    Pain assessment: Pain denied     Objective     RANGE OF MOTION: LE ROM WNL  STRENGTH:   Pain: - none + mild ++ moderate +++ severe  Strength Scale: 0-5/5 Left Right   Ankle Dorsiflexion 5 5   Ankle Plantarflexion 5 5   Ankle Inversion 5 5   Ankle Eversion 5 5     Pain: - none + mild ++ moderate +++ severe  Strength Scale: 0-5/5 Left Right   Hip Flexion 4 4   Hip Abduction 5 5   Hip Adduction 5 5   Knee Flexion 5 4   Knee Extension 5 4     Functional Strength Testing:  DL Calf Raise: Pt was able to perform 20 but at 17 the exercise became  difficult and fatigued the mms.   SL Squat: Pt was able to perform 8 on the R leg and experienced balance challenges and fatigue. Pt perform 10 on the L and experienced less fatigue and no balance difficulties.  Step Ups: Pt was able to perform 10 steps ups on both L and R but exhibited excessive trunk lean and some knee valgus to compensate for limited functional quadricep strength.  Step Downs: Pt was able to perform 10 eccentric step downs on both L and R leg but exhibited knee valgus and contralateral hip drop to compensate for strength limitations.  *In functional tasks, pt exhibited R ankle instability, specifically in SL tasks. Pt contributes instability potentially to past chronic ankle sprains.          Assessment & Plan   CLINICAL IMPRESSIONS  Medical Diagnosis: Plantar fasciitis, right    Treatment Diagnosis: Plantar fasciitis, right   Impression/Assessment: Patient is a 38 year old female with hx of foot pain and generalized weakness complaints.  The following significant findings have been identified: Decreased strength, Impaired balance, Impaired muscle performance, and Decreased activity tolerance. These impairments interfere with their ability to perform recreational activities as compared to previous level of function.     Clinical Decision Making (Complexity):  Clinical Presentation: Stable/Uncomplicated  Clinical Presentation Rationale: based on medical and personal factors listed in PT evaluation  Clinical Decision Making (Complexity): Low complexity    PLAN OF CARE  Treatment Interventions:  Modalities:  Modalities as indicated  Interventions: Manual Therapy, Neuromuscular Re-education, Therapeutic Activity, Therapeutic Exercise    Long Term Goals     PT Goal 1  Goal Identifier: Step Down  Goal Description: Pt will be able to perform 20 B step downs without any compensation to demonstrate improved mm strength and endurance, without any increase in R foot pain.  Rationale: to maximize safety and  independence with performance of ADLs and functional tasks;to maximize safety and independence within the community  Target Date: 12/11/23  PT Goal 2  Goal Identifier: HEP  Goal Description: Pt will be independent and confident in performing and progressing HEP in order to continue global strengthening and improve tolerance to recreational activities such as playing with daughter.  Rationale: to maximize safety and independence with performance of ADLs and functional tasks;to maximize safety and independence within the community  Target Date: 12/11/23      Frequency of Treatment: 5 visits  Duration of Treatment: 60 days    Recommended Referrals to Other Professionals:   Education Assessment:   Learner/Method: Patient;Listening;Reading;Demonstration;Pictures/Video;No Barriers to Learning    Risks and benefits of evaluation/treatment have been explained.   Patient/Family/caregiver agrees with Plan of Care.     Evaluation Time:     PT Eval, Low Complexity Minutes (04211): 35       Signing Clinician: Karissa Branch, PT

## 2023-10-13 PROBLEM — M72.2 PLANTAR FASCIITIS, RIGHT: Status: ACTIVE | Noted: 2023-10-13

## 2023-10-19 ENCOUNTER — TELEPHONE (OUTPATIENT)
Dept: ORTHOPEDICS | Facility: CLINIC | Age: 38
End: 2023-10-19

## 2023-10-19 DIAGNOSIS — M22.2X1 PATELLOFEMORAL PAIN SYNDROME OF RIGHT KNEE: ICD-10-CM

## 2023-10-19 DIAGNOSIS — M25.561 ACUTE PAIN OF RIGHT KNEE: Primary | ICD-10-CM

## 2023-10-19 NOTE — TELEPHONE ENCOUNTER
----- Message from Karissa Branch PT sent at 10/12/2023 11:06 AM CDT -----  Regarding: PT order  Alejandra presented to use with order from Dr Moon for plantar fascitis, this pain has significantly improved.  Pt reports her knee pain that she was seen by you for in May has continued and increased.  Are you okay with writing an order for us to treat her knee pain or would you like to see her back in clinic again    Please let me know if you have any other questions or concerns    Karissa Branch, PT, DPT, OCS, CSCS  [facility]  [phone]

## 2023-11-29 PROBLEM — Z36.89 ENCOUNTER FOR TRIAGE IN PREGNANT PATIENT: Status: RESOLVED | Noted: 2019-12-20 | Resolved: 2023-11-29

## 2023-11-29 PROBLEM — O09.293: Status: RESOLVED | Noted: 2019-10-14 | Resolved: 2023-11-29

## 2023-11-29 PROBLEM — O26.879 SHORT CERVIX AFFECTING PREGNANCY: Status: RESOLVED | Noted: 2019-09-04 | Resolved: 2023-11-29

## 2023-11-29 PROBLEM — O34.32 CERVICAL INSUFFICIENCY DURING PREGNANCY IN SECOND TRIMESTER, ANTEPARTUM: Status: RESOLVED | Noted: 2019-09-04 | Resolved: 2023-11-29

## 2023-11-30 ENCOUNTER — OFFICE VISIT (OUTPATIENT)
Dept: FAMILY MEDICINE | Facility: OTHER | Age: 38
End: 2023-11-30
Payer: COMMERCIAL

## 2023-11-30 VITALS
SYSTOLIC BLOOD PRESSURE: 120 MMHG | HEIGHT: 67 IN | TEMPERATURE: 97.5 F | HEART RATE: 81 BPM | DIASTOLIC BLOOD PRESSURE: 80 MMHG | OXYGEN SATURATION: 96 % | BODY MASS INDEX: 44.73 KG/M2 | WEIGHT: 285 LBS | RESPIRATION RATE: 16 BRPM

## 2023-11-30 DIAGNOSIS — E66.813 CLASS 3 SEVERE OBESITY DUE TO EXCESS CALORIES WITHOUT SERIOUS COMORBIDITY WITH BODY MASS INDEX (BMI) OF 40.0 TO 44.9 IN ADULT (H): ICD-10-CM

## 2023-11-30 DIAGNOSIS — Z12.4 CERVICAL CANCER SCREENING: ICD-10-CM

## 2023-11-30 DIAGNOSIS — E66.01 CLASS 3 SEVERE OBESITY DUE TO EXCESS CALORIES WITHOUT SERIOUS COMORBIDITY WITH BODY MASS INDEX (BMI) OF 40.0 TO 44.9 IN ADULT (H): ICD-10-CM

## 2023-11-30 DIAGNOSIS — R07.81 RIB PAIN: ICD-10-CM

## 2023-11-30 DIAGNOSIS — Z00.00 ROUTINE GENERAL MEDICAL EXAMINATION AT A HEALTH CARE FACILITY: Primary | ICD-10-CM

## 2023-11-30 LAB
ALBUMIN SERPL BCG-MCNC: 4.3 G/DL (ref 3.5–5.2)
ALP SERPL-CCNC: 46 U/L (ref 40–150)
ALT SERPL W P-5'-P-CCNC: 22 U/L (ref 0–50)
ANION GAP SERPL CALCULATED.3IONS-SCNC: 11 MMOL/L (ref 7–15)
AST SERPL W P-5'-P-CCNC: 26 U/L (ref 0–45)
BILIRUB SERPL-MCNC: 0.9 MG/DL
BUN SERPL-MCNC: 8.2 MG/DL (ref 6–20)
CALCIUM SERPL-MCNC: 9.1 MG/DL (ref 8.6–10)
CHLORIDE SERPL-SCNC: 102 MMOL/L (ref 98–107)
CHOLEST SERPL-MCNC: 231 MG/DL
CREAT SERPL-MCNC: 0.66 MG/DL (ref 0.51–0.95)
DEPRECATED HCO3 PLAS-SCNC: 25 MMOL/L (ref 22–29)
EGFRCR SERPLBLD CKD-EPI 2021: >90 ML/MIN/1.73M2
GLUCOSE SERPL-MCNC: 108 MG/DL (ref 70–99)
HBA1C MFR BLD: 6.3 % (ref 0–5.6)
HDLC SERPL-MCNC: 40 MG/DL
LDLC SERPL CALC-MCNC: 165 MG/DL
NONHDLC SERPL-MCNC: 191 MG/DL
POTASSIUM SERPL-SCNC: 4.2 MMOL/L (ref 3.4–5.3)
PROT SERPL-MCNC: 6.8 G/DL (ref 6.4–8.3)
SODIUM SERPL-SCNC: 138 MMOL/L (ref 135–145)
TRIGL SERPL-MCNC: 130 MG/DL

## 2023-11-30 PROCEDURE — 80053 COMPREHEN METABOLIC PANEL: CPT | Performed by: FAMILY MEDICINE

## 2023-11-30 PROCEDURE — 83036 HEMOGLOBIN GLYCOSYLATED A1C: CPT | Performed by: FAMILY MEDICINE

## 2023-11-30 PROCEDURE — G0145 SCR C/V CYTO,THINLAYER,RESCR: HCPCS | Performed by: FAMILY MEDICINE

## 2023-11-30 PROCEDURE — 36415 COLL VENOUS BLD VENIPUNCTURE: CPT | Performed by: FAMILY MEDICINE

## 2023-11-30 PROCEDURE — 90746 HEPB VACCINE 3 DOSE ADULT IM: CPT | Performed by: FAMILY MEDICINE

## 2023-11-30 PROCEDURE — 87624 HPV HI-RISK TYP POOLED RSLT: CPT | Performed by: FAMILY MEDICINE

## 2023-11-30 PROCEDURE — 99395 PREV VISIT EST AGE 18-39: CPT | Mod: 25 | Performed by: FAMILY MEDICINE

## 2023-11-30 PROCEDURE — 80061 LIPID PANEL: CPT | Performed by: FAMILY MEDICINE

## 2023-11-30 PROCEDURE — 90471 IMMUNIZATION ADMIN: CPT | Performed by: FAMILY MEDICINE

## 2023-11-30 ASSESSMENT — ENCOUNTER SYMPTOMS
WEAKNESS: 0
JOINT SWELLING: 0
DIZZINESS: 0
MYALGIAS: 0
PALPITATIONS: 0
ABDOMINAL PAIN: 0
EYE PAIN: 0
FEVER: 0
HEMATOCHEZIA: 0
COUGH: 0
NERVOUS/ANXIOUS: 0
SHORTNESS OF BREATH: 0
HEADACHES: 0
HEARTBURN: 0
NAUSEA: 0
FREQUENCY: 0
DIARRHEA: 0
CHILLS: 0
HEMATURIA: 0
SORE THROAT: 0
DYSURIA: 0
BREAST MASS: 0
PARESTHESIAS: 0
ARTHRALGIAS: 0
CONSTIPATION: 0

## 2023-11-30 ASSESSMENT — PAIN SCALES - GENERAL: PAINLEVEL: NO PAIN (0)

## 2023-11-30 NOTE — PROGRESS NOTES
SUBJECTIVE:   Alejandra is a 38 year old, presenting for the following:  Physical        11/30/2023     8:04 AM   Additional Questions   Roomed by Karo       Healthy Habits:     Getting at least 3 servings of Calcium per day:  Yes    Bi-annual eye exam:  Yes    Dental care twice a year:  Yes    Sleep apnea or symptoms of sleep apnea:  None    Diet:  Regular (no restrictions)    Frequency of exercise:  2-3 days/week    Duration of exercise:  15-30 minutes    Taking medications regularly:  Yes    Medication side effects:  None    Additional concerns today:  No    Alejandra is a 38 year-old female coming in today for her annual wellness exam and dull cramping on her left side near her ribs:     Today, she would like to do her PAP smear and get her Hepatitis B vaccine. She stated that exercise has not been the best and she has gained back the weight that she lost. She feels pain in her knees and ankles that she is working with physical therapy with.  Her last meeting with physical therapy was in October and her next meeting is next week. She stated that the person she was working was out of office, so she was not able to get an appointment till recently.     She has also been experiencing pain since March. She states that it is located on her left side near the bottom ribs.It was originally more of a painful throbbing feeling, so she went in and got an ultrasound done on 3/21 and was informed that no abnormal findings were found. She stated the pain gradually got better with time and happens weekly. She only feels a dull cramping feeling when she bends down to put on socks and she feels it for less than 30 seconds. She has not noticed any other symptoms and just wanted to discuss it because it has been lingering for a while.         Social History     Tobacco Use    Smoking status: Never    Smokeless tobacco: Never   Substance Use Topics    Alcohol use: Yes     Alcohol/week: 1.7 standard drinks of alcohol     Comment:  Alcoholic Drinks/day: 1-2drinks per week             2023     7:14 AM   Alcohol Use   Prescreen: >3 drinks/day or >7 drinks/week? No     Reviewed orders with patient.  Reviewed health maintenance and updated orders accordingly - Yes  Lab work is in process    Breast Cancer Screenin/15/2022     9:27 AM   Breast CA Risk Assessment (FHS-7)   Do you have a family history of breast, colon, or ovarian cancer? No / Unknown         Patient under 40 years of age: Routine Mammogram Screening not recommended.   Pertinent mammograms are reviewed under the imaging tab.    History of abnormal Pap smear: NO - age 30-65 PAP every 5 years with negative HPV co-testing recommended      Latest Ref Rng & Units 2019     3:45 PM 10/20/2015     8:16 AM   PAP / HPV   PAP Negative for squamous intraepithelial lesion or malignancy. Negative for squamous intraepithelial lesion or malignancy  Electronically signed by Phyllis Roblero CT (ASCP) on 2019 at  2:43 PM    Negative for squamous intraepithelial lesion or malignancy  Electronically signed by Jose Pierre CT (ASCP) on 2015 at  4:34 PM      HPV 16 DNA NEG Negative     HPV 18 DNA NEG Negative     Other HR HPV NEG Negative       Reviewed and updated as needed this visit by clinical staff   Tobacco  Allergies  Meds  Problems  Med Hx  Surg Hx  Fam Hx          Reviewed and updated as needed this visit by Provider   Tobacco  Allergies  Meds  Problems  Med Hx  Surg Hx  Fam Hx             Review of Systems   Constitutional:  Negative for chills and fever.   HENT:  Negative for congestion, ear pain, hearing loss and sore throat.    Eyes:  Negative for pain and visual disturbance.   Respiratory:  Negative for cough and shortness of breath.    Cardiovascular:  Negative for chest pain, palpitations and peripheral edema.   Gastrointestinal:  Negative for abdominal pain, constipation, diarrhea, heartburn, hematochezia and nausea.   Breasts:   "Negative for tenderness, breast mass and discharge.   Genitourinary:  Negative for dysuria, frequency, genital sores, hematuria, pelvic pain, urgency, vaginal bleeding and vaginal discharge.   Musculoskeletal:  Negative for arthralgias, joint swelling and myalgias.   Skin:  Negative for rash.   Neurological:  Negative for dizziness, weakness, headaches and paresthesias.   Psychiatric/Behavioral:  Negative for mood changes. The patient is not nervous/anxious.           OBJECTIVE:   /80   Pulse 81   Temp 97.5  F (36.4  C) (Temporal)   Resp 16   Ht 1.712 m (5' 7.4\")   Wt 129.3 kg (285 lb)   LMP 11/09/2023   SpO2 96%   BMI 44.11 kg/m    Physical Exam  Constitutional:       Appearance: Normal appearance.   HENT:      Right Ear: Tympanic membrane, ear canal and external ear normal.      Left Ear: Tympanic membrane, ear canal and external ear normal.      Mouth/Throat:      Mouth: Mucous membranes are moist.      Pharynx: Oropharynx is clear.   Eyes:      General:         Right eye: No discharge.         Left eye: No discharge.      Extraocular Movements: Extraocular movements intact.      Conjunctiva/sclera: Conjunctivae normal.   Cardiovascular:      Rate and Rhythm: Regular rhythm.      Heart sounds: Normal heart sounds. No murmur heard.     No friction rub. No gallop.   Pulmonary:      Breath sounds: Normal breath sounds. No stridor. No wheezing, rhonchi or rales.   Abdominal:      General: Bowel sounds are normal.      Palpations: Abdomen is soft. There is no mass.      Tenderness: There is no abdominal tenderness.      Hernia: No hernia is present.          Comments: Was not able to reproduce point pain patient stated she was feeling in the left side near the bottom of her ribs   Musculoskeletal:         General: Normal range of motion.      Cervical back: Normal range of motion and neck supple.   Lymphadenopathy:      Cervical: No cervical adenopathy.   Skin:     General: Skin is warm and dry.      " Comments: Surgical scarring noted just distal to the area of discomfort on the ribs. No pain reproducible today.   Neurological:      General: No focal deficit present.      Mental Status: She is alert.   Psychiatric:         Mood and Affect: Mood normal.         Behavior: Behavior normal.         Judgment: Judgment normal.         ASSESSMENT/PLAN:       ICD-10-CM    1. Routine general medical examination at a health care facility  Z00.00       2. Class 3 severe obesity due to excess calories without serious comorbidity with body mass index (BMI) of 40.0 to 44.9 in adult (H)  E66.01 Lipid panel reflex to direct LDL Fasting    Z68.41 Comprehensive metabolic panel (BMP + Alb, Alk Phos, ALT, AST, Total. Bili, TP)     Hemoglobin A1c     Lipid panel reflex to direct LDL Fasting     Comprehensive metabolic panel (BMP + Alb, Alk Phos, ALT, AST, Total. Bili, TP)     Hemoglobin A1c      3. Cervical cancer screening  Z12.4 Pap Screen with HPV - recommended age 30 - 65 years      4. Rib pain  R07.81           1 RIb pain  Upon physical examination, palpation of the region patient stated the pain was not reproduced  -Discussed possible etiologies: obesity, hernia, adhesion, and other   Though given the mechanism and lack of physica findings, it is likely a muscular strain area that may be likely worsened with the weight gain.   Discussed no action needs to be done at this time because pain is not often and lasts less than 30 seconds    If patient feels more pain, she can come back in for further workup     2. Annual visit  -Patient would like her hepatitis B vaccine and pap smear today     3. Obesity  -Discussed weight reduction and continue working with physical therapy. Labs for secondary complications of weight were completed today.            Patient has been advised of split billing requirements and indicates understanding: Yes      COUNSELING:  Reviewed preventive health counseling, as reflected in patient instructions        Regular exercise       Healthy diet/nutrition       Immunizations  Vaccinated for: Hepatitis B          She reports that she has never smoked. She has never used smokeless tobacco.          Yesika Murdock MD, MD  [facility]

## 2023-12-04 LAB
BKR LAB AP GYN ADEQUACY: NORMAL
BKR LAB AP GYN INTERPRETATION: NORMAL
BKR LAB AP HPV REFLEX: NORMAL
BKR LAB AP PREVIOUS ABNORMAL: NORMAL
PATH REPORT.COMMENTS IMP SPEC: NORMAL
PATH REPORT.COMMENTS IMP SPEC: NORMAL
PATH REPORT.RELEVANT HX SPEC: NORMAL

## 2023-12-05 ENCOUNTER — THERAPY VISIT (OUTPATIENT)
Dept: PHYSICAL THERAPY | Facility: CLINIC | Age: 38
End: 2023-12-05
Payer: COMMERCIAL

## 2023-12-05 DIAGNOSIS — M72.2 PLANTAR FASCIITIS, RIGHT: Primary | ICD-10-CM

## 2023-12-05 PROCEDURE — 97140 MANUAL THERAPY 1/> REGIONS: CPT | Mod: GP

## 2023-12-05 PROCEDURE — 97035 APP MDLTY 1+ULTRASOUND EA 15: CPT | Mod: GP

## 2023-12-05 PROCEDURE — 97110 THERAPEUTIC EXERCISES: CPT | Mod: GP

## 2023-12-06 LAB
HUMAN PAPILLOMA VIRUS 16 DNA: NEGATIVE
HUMAN PAPILLOMA VIRUS 18 DNA: NEGATIVE
HUMAN PAPILLOMA VIRUS FINAL DIAGNOSIS: NORMAL
HUMAN PAPILLOMA VIRUS OTHER HR: NEGATIVE

## 2023-12-14 ENCOUNTER — THERAPY VISIT (OUTPATIENT)
Dept: PHYSICAL THERAPY | Facility: CLINIC | Age: 38
End: 2023-12-14
Payer: COMMERCIAL

## 2023-12-14 DIAGNOSIS — M72.2 PLANTAR FASCIITIS, RIGHT: Primary | ICD-10-CM

## 2023-12-14 PROCEDURE — 97110 THERAPEUTIC EXERCISES: CPT | Mod: GP | Performed by: PHYSICAL THERAPIST

## 2023-12-14 PROCEDURE — 97140 MANUAL THERAPY 1/> REGIONS: CPT | Mod: GP | Performed by: PHYSICAL THERAPIST

## 2023-12-14 PROCEDURE — 97035 APP MDLTY 1+ULTRASOUND EA 15: CPT | Mod: GP | Performed by: PHYSICAL THERAPIST

## 2023-12-21 ENCOUNTER — THERAPY VISIT (OUTPATIENT)
Dept: PHYSICAL THERAPY | Facility: CLINIC | Age: 38
End: 2023-12-21
Attending: PODIATRIST
Payer: COMMERCIAL

## 2023-12-21 DIAGNOSIS — M72.2 PLANTAR FASCIITIS, RIGHT: Primary | ICD-10-CM

## 2023-12-21 PROCEDURE — 97035 APP MDLTY 1+ULTRASOUND EA 15: CPT | Mod: GP | Performed by: PHYSICAL THERAPIST

## 2023-12-21 PROCEDURE — 97110 THERAPEUTIC EXERCISES: CPT | Mod: GP | Performed by: PHYSICAL THERAPIST

## 2023-12-21 PROCEDURE — 97140 MANUAL THERAPY 1/> REGIONS: CPT | Mod: GP | Performed by: PHYSICAL THERAPIST

## 2023-12-22 NOTE — PROGRESS NOTES
12/21/23 0500   Appointment Info   Signing clinician's name / credentials Cristela Neal, PT, DPT   Visits Used 4   Medical Diagnosis Plantar fasciitis, right   PT Tx Diagnosis Plantar fasciitis, right   Progress Note/Certification   Onset of illness/injury or Date of Surgery 07/01/23   Therapy Frequency 1x   Predicted Duration 6 weeks   Progress Note Due Date 02/04/24   GOALS   PT Goals 2;3   PT Goal 1   Goal Identifier Step Down   Goal Description Pt will be able to perform 20 B step downs without any compensation to demonstrate improved mm strength and endurance, without any increase in R foot pain.   Rationale to maximize safety and independence with performance of ADLs and functional tasks;to maximize safety and independence within the community   Target Date 02/03/24   PT Goal 2   Goal Identifier HEP   Goal Description Pt will be independent and confident in performing and progressing HEP in order to continue global strengthening and improve tolerance to recreational activities such as playing with daughter.   Rationale to maximize safety and independence with performance of ADLs and functional tasks;to maximize safety and independence within the community   Goal Progress Daily HEP   Target Date 12/11/23   Date Met 12/21/23   PT Goal 3   Goal Identifier New Goal Walk   Goal Description Pt will demonstrate ability to walk for 1 mile without having ankle/leg tightness and pain in order to be more active.   Target Date 02/03/24   Subjective Report   Subjective Report Things are improving.  Still having pain in the peroneal areas.  Pt states she feels it while walking about 0.3 miles.   Objective Measures   Objective Measures Objective Measure 1   Objective Measure 1   Objective Measure Palpation   Details Tenderness and tightness in the peroneal region.   Ultrasound   Ultrasound Minutes (94170) 11   Treatment Detail Laying supine with pillow under lower legs.   Patient Response/Progress No concerns   Intensity  0.8w/cm2   Duration (does not include the 3-5 min set up/clean up time) 8 min   Frequency 1 MHz   Location Lateral R peroneal region   Positioning supine   Ultrasound -Type (does not include 3-5 min prep/cleanup time) Continuous;5 cm sound head   Treatment Interventions (PT)   Interventions Therapeutic Procedure/Exercise;Manual Therapy   Therapeutic Procedure/Exercise   Therapeutic Procedures: strength, endurance, ROM, flexibillity minutes (13785) 10   Ther Proc 1 - Details Instructed pt on ways to utilize massage gun and massage roller.  Educated pt on progression of current HEP.   Skilled Intervention Reinstruction   Patient Response/Progress Good understanding   Manual Therapy   Manual Therapy: Mobilization, MFR, MLD, friction massage minutes (88524) 15   Manual Therapy 1 - Details STM to the R peroneal musculature with pt supine.   Skilled Intervention Manual technqies for tissue mobility   Patient Response/Progress Feels good.   Education   Learner/Method Patient;Listening;Reading;No Barriers to Learning;Pictures/Video;Demonstration   Education Comments HEP, POC   Plan   Home program Ankle ROM strengthening, SL Balance, Step Ups, pin and stretch for peroneals   Plan for next session Gluteal and hamstring strengthening.  Manual techniques to peroneal region.   Total Session Time   Timed Code Treatment Minutes 36   Total Treatment Time (sum of timed and untimed services) 36         PLAN  Continue therapy per current plan of care.  Progress LE strengthening and stability.    Beginning/End Dates of Progress Note Reporting Period:    10/23/2023 to 12/21/2023    Referring Provider:  Kendrick Moon

## 2024-01-09 ENCOUNTER — THERAPY VISIT (OUTPATIENT)
Dept: PHYSICAL THERAPY | Facility: CLINIC | Age: 39
End: 2024-01-09
Attending: PODIATRIST
Payer: COMMERCIAL

## 2024-01-09 DIAGNOSIS — M72.2 PLANTAR FASCIITIS, RIGHT: Primary | ICD-10-CM

## 2024-01-09 PROCEDURE — 97140 MANUAL THERAPY 1/> REGIONS: CPT | Mod: GP | Performed by: PHYSICAL THERAPIST

## 2024-01-09 PROCEDURE — 97110 THERAPEUTIC EXERCISES: CPT | Mod: GP | Performed by: PHYSICAL THERAPIST

## 2024-01-16 ENCOUNTER — NURSE TRIAGE (OUTPATIENT)
Dept: NURSING | Facility: CLINIC | Age: 39
End: 2024-01-16
Payer: COMMERCIAL

## 2024-01-17 ENCOUNTER — OFFICE VISIT (OUTPATIENT)
Dept: FAMILY MEDICINE | Facility: CLINIC | Age: 39
End: 2024-01-17
Payer: COMMERCIAL

## 2024-01-17 VITALS
OXYGEN SATURATION: 98 % | BODY MASS INDEX: 41.68 KG/M2 | DIASTOLIC BLOOD PRESSURE: 100 MMHG | HEART RATE: 90 BPM | WEIGHT: 275 LBS | TEMPERATURE: 98.4 F | RESPIRATION RATE: 20 BRPM | HEIGHT: 68 IN | SYSTOLIC BLOOD PRESSURE: 150 MMHG

## 2024-01-17 DIAGNOSIS — R07.9 CHEST PAIN, UNSPECIFIED TYPE: Primary | ICD-10-CM

## 2024-01-17 DIAGNOSIS — R03.0 ELEVATED BLOOD PRESSURE READING WITHOUT DIAGNOSIS OF HYPERTENSION: ICD-10-CM

## 2024-01-17 PROCEDURE — 99213 OFFICE O/P EST LOW 20 MIN: CPT | Mod: 25 | Performed by: NURSE PRACTITIONER

## 2024-01-17 PROCEDURE — 93000 ELECTROCARDIOGRAM COMPLETE: CPT | Performed by: NURSE PRACTITIONER

## 2024-01-17 ASSESSMENT — PAIN SCALES - GENERAL: PAINLEVEL: NO PAIN (0)

## 2024-01-17 NOTE — PROGRESS NOTES
Assessment & Plan     Chest pain, unspecified type  -Now resolved, discussed with patient this was likely musculoskeletal in nature given symptoms of viral illness along with the stress of traveling. The fact that the chest pain has gone away with Tylenol and ibuprofen is very reassuring. EKG in clinic normal, QRS measured by hand 92 msec so low concern for incomplete RBBB.   -If chest pain were to return and worsen, become sustained, or accompanied by racing heart, light headedness, or shortness of breath, recommend going to emergency department for further evaluation.   - EKG 12-lead complete w/read - Clinics    Elevated blood pressure reading without diagnosis of hypertension  -may be due to elevated stress/anxiety given recent travel and symptoms. Recommend following up with PCP in 2-4 weeks to have blood pressure rechecked.     Ordering of each unique test          Subjective   Alejandra is a 38 year old, presenting for the following health issues:  Chest Pain        1/17/2024    10:18 AM   Additional Questions   Roomed by Bhavya TORRES   Accompanied by self     Morning before flying out felt mild wheezing. 45 minutes into the flight started having chills, dull ache that would come and go upper left chest. Have not noticed the chest pain today, has been taking ibuprofen and Tylenol. Not feling worse, is feeling under weather. Is coughing, mostly dry, clear mucus. Deep inspiration did not impact pain. Pushing on chest did not make it better or worse. Pulse felt fast on the flight.     Mom had heart valve replaced a month ago, had blood clot in lung.    Sister had blood clot in leg after knee surgery.     History of Present Illness       Reason for visit:  Completed nurse consult for chest pain via phone on Jan 16, she recommended infollow up in person within 24 hours    She eats 2-3 servings of fruits and vegetables daily.She consumes 0 sweetened beverage(s) daily.She exercises with enough effort to increase her heart  "rate 10 to 19 minutes per day.  She exercises with enough effort to increase her heart rate 3 or less days per week.   She is taking medications regularly.             Objective    BP (!) 150/104   Pulse 90   Temp 98.4  F (36.9  C) (Tympanic)   Resp 20   Ht 1.715 m (5' 7.5\")   Wt 124.7 kg (275 lb)   LMP 01/05/2024   SpO2 98%   BMI 42.44 kg/m    Body mass index is 42.44 kg/m .    Review of Systems  Constitutional, HEENT, cardiovascular, pulmonary, GI, , musculoskeletal, neuro, skin, endocrine and psych systems are negative, except as otherwise noted.  Physical Exam  Constitutional:       General: She is not in acute distress.     Appearance: Normal appearance.   HENT:      Right Ear: Tympanic membrane, ear canal and external ear normal.      Left Ear: Tympanic membrane, ear canal and external ear normal.      Nose: Nose normal.   Eyes:      Pupils: Pupils are equal, round, and reactive to light.   Cardiovascular:      Rate and Rhythm: Normal rate.      Pulses: Normal pulses.      Heart sounds: Normal heart sounds. No murmur heard.     No friction rub. No gallop.   Pulmonary:      Effort: Pulmonary effort is normal. No respiratory distress.      Breath sounds: No wheezing, rhonchi or rales.   Skin:     General: Skin is warm and dry.   Neurological:      General: No focal deficit present.      Mental Status: She is alert and oriented to person, place, and time.   Psychiatric:         Mood and Affect: Mood normal.         Behavior: Behavior normal.         Thought Content: Thought content normal.         Judgment: Judgment normal.            EKG - Reviewed and interpreted by me appears normal, NSR, normal axis, normal intervals, no acute ST/T changes c/w ischemia, no LVH by voltage criteria, there are no prior tracings available        Signed Electronically by: GLORIA Dash CNP    "

## 2024-01-17 NOTE — COMMUNITY RESOURCES LIST (ENGLISH)
01/17/2024   [facility]  N/A  For questions about this resource list or additional care needs, please contact your primary care clinic or care manager.  Phone: [phone]   Email: N/A   Address: [address] [place] [id number]   Hours: N/A        Hotlines and Helplines       Hotline - Housing crisis  1  [facility] Housing Resource Line Distance: 14.32 miles      Phone/Virtual   [address] [place] [address]  Language: English  Hours: Mon - Sun Open 24 Hours   Phone: (509) 710-2852 Website: [email or web address]          Housing       Coordinated Entry access point  2  [facility]  Office - [facility] Distance: 20.52 miles      Phone/Virtual   [address] [place] [address]  Language: English  Hours: Mon - Fri 8:30 AM - 12:00 PM , Mon - Fri 1:00 PM - 4:00 PM  Fees: Free   Phone: (634) 628-1183 Ext.2 Email: [email or web address] Website: [email or web address]     Housing search assistance  3  [facility] [employer] ([employer]) - [employer] R[employer] Housing Distance: 20.52 miles      In-Person, Phone/Virtual   [address] [place] [address]  Language: English  Hours: Mon - Fri 8:00 AM - 4:30 PM  Fees: Free   Phone: (166) 245-5995 Email: accap@accap.org Website: http://www.accap.org     4  [employer] ([employer]) Distance: 23.85 miles      Phone/Virtual   [address] [place] [address]  Language: English, Arabic  Hours: Mon - Fri 9:00 AM - 5:00 PM  Fees: Free   Phone: (234) 478-9714 Email: services@[employer] Website: https://www.[employer]          Important Numbers & Websites       Emergency Services   911  City Services   311  Poison Control   (504) 908-1746  Suicide Prevention Lifeline   (329) 652-1736 (TALK)  Child Abuse Hotline   (318) 014-7416 (4-A-Child)  Sexual Assault Hotline   (565) 210-2915 (HOPE)  National  Runaway Safeline   (838) 932-1408 (RUNAWAY)  All-Options Talkline   (246) 642-2158  Substance Abuse Referral   (742) 332-8262 (HELP)

## 2024-01-17 NOTE — TELEPHONE ENCOUNTER
Nurse Triage SBAR    Is this a 2nd Level Triage? NO    Situation: Chest Pain    Background: :Patient just landed from a 3 hour flight from [place].    Assessment: Patient calling to report onset of chest pain today, left side pain lasting for 1-2 seconds every 20 seconds to minute. Pain is 5/10, denies increase in frequency, duration, or severity. She reports chills and mild wheezing this morning. She denies difficulty breathing, blue lips, increase in pain with deep breath, dizziness, coughing up blood, or unusual sweating.    Protocol Recommended Disposition:   According to the protocol, patient should see provider within 24 hours.  Care advice given.     CALL BACK IF: * Difficulty breathing occurs * Chest pain increases in frequency, duration or severity * Chest pain lasts over 5 minutes * You become worse     Patient verbalizes understanding and agrees with plan of care. Plans to go to  tomorrow.    Pema Oreilly RN  01/16/24 7:24 PM  [facility] Nurse Advisor      Reason for Disposition   [1] Chest pain lasts < 5 minutes AND [2] NO chest pain or cardiac symptoms (e.g., breathing difficulty, sweating) now  (Exception: Chest pains that last only a few seconds.)    Additional Information   Negative: SEVERE difficulty breathing (e.g., struggling for each breath, speaks in single words)   Negative: Difficult to awaken or acting confused (e.g., disoriented, slurred speech)   Negative: Shock suspected (e.g., cold/pale/clammy skin, too weak to stand, low BP, rapid pulse)   Negative: Passed out (i.e., lost consciousness, collapsed and was not responding)   Negative: Chest pain lasting longer than 5 minutes and ANY of the following:    history of heart disease  (i.e., heart attack, bypass surgery, angina, angioplasty, CHF; not just a heart murmur)    described as crushing, pressure-like, or heavy    age > 50    age > 30 AND at least one cardiac risk factor (i.e., hypertension, diabetes, obesity, smoker or  "strong family history of heart disease)    not relieved with nitroglycerin   Negative: Heart beating < 50 beats per minute OR > 140 beats per minute   Negative: Followed a chest injury   Negative: SEVERE chest pain   Negative: [1] Chest pain (or \"angina\") comes and goes AND [2] is happening more often (increasing in frequency) or getting worse (increasing in severity)  (Exception: Chest pains that last only a few seconds.)   Negative: Pain also in shoulder(s) or arm(s) or jaw  (Exception: Pain is clearly made worse by movement.)   Negative: Difficulty breathing   Negative: Dizziness or lightheadedness   Negative: Coughing up blood   Negative: Cocaine use within last 3 days   Negative: Major surgery in past month   Negative: Hip or leg fracture (broken bone) in past month (or had cast on leg or ankle in past month)   Negative: Illness requiring prolonged bedrest in past month (e.g., immobilization, long hospital stay)   Negative: Long-distance travel in past month (e.g., car, bus, train, plane; with trip lasting 6 or more hours)   Negative: History of prior \"blood clot\" in leg or lungs (i.e., deep vein thrombosis, pulmonary embolism)   Negative: History of inherited increased risk of blood clots (e.g., Factor 5 Leiden, Anti-thrombin 3, Protein C or Protein S deficiency, Prothrombin mutation)   Negative: Cancer treatment in past six months (or has cancer now)   Negative: [1] Chest pain lasts > 5 minutes AND [2] occurred in past 3 days (72 hours) (Exception: Feels exactly the same as previously diagnosed heartburn and has accompanying sour taste in mouth.)   Negative: Taking a deep breath makes pain worse   Negative: Patient sounds very sick or weak to the triager   Negative: [1] Chest pain lasts > 5 minutes AND [2] occurred > 3 days ago (72 hours) AND [3] NO chest pain or cardiac symptoms now    Protocols used: Chest Pain-A-AH    "

## 2024-01-29 ENCOUNTER — TELEPHONE (OUTPATIENT)
Dept: PHYSICAL THERAPY | Facility: CLINIC | Age: 39
End: 2024-01-29
Payer: COMMERCIAL

## 2024-01-29 DIAGNOSIS — M72.2 PLANTAR FASCIITIS, RIGHT: Primary | ICD-10-CM

## 2024-01-31 ENCOUNTER — OFFICE VISIT (OUTPATIENT)
Dept: FAMILY MEDICINE | Facility: CLINIC | Age: 39
End: 2024-01-31
Payer: COMMERCIAL

## 2024-01-31 VITALS
WEIGHT: 274.2 LBS | OXYGEN SATURATION: 100 % | RESPIRATION RATE: 20 BRPM | BODY MASS INDEX: 41.56 KG/M2 | DIASTOLIC BLOOD PRESSURE: 98 MMHG | TEMPERATURE: 98.8 F | SYSTOLIC BLOOD PRESSURE: 152 MMHG | HEIGHT: 68 IN | HEART RATE: 104 BPM

## 2024-01-31 DIAGNOSIS — I10 BENIGN ESSENTIAL HYPERTENSION: Primary | ICD-10-CM

## 2024-01-31 PROCEDURE — 99214 OFFICE O/P EST MOD 30 MIN: CPT | Performed by: NURSE PRACTITIONER

## 2024-01-31 RX ORDER — LISINOPRIL/HYDROCHLOROTHIAZIDE 10-12.5 MG
1 TABLET ORAL DAILY
Qty: 30 TABLET | Refills: 0 | Status: SHIPPED | OUTPATIENT
Start: 2024-01-31 | End: 2024-02-16

## 2024-01-31 ASSESSMENT — PAIN SCALES - GENERAL: PAINLEVEL: NO PAIN (0)

## 2024-01-31 NOTE — PROGRESS NOTES
"  Assessment & Plan     Benign essential hypertension  Recheck BMP and bp in 2 weeks   -DASH diet and exercise guidelines  -Start lisinopril-hydrochlorothiazide (ZESTORETIC) 10-12.5 MG tablet; Take 1 tablet by mouth daily for 30 days    Ordering of each unique test  Prescription drug management        BMI  Estimated body mass index is 42.31 kg/m  as calculated from the following:    Height as of this encounter: 1.715 m (5' 7.5\").    Weight as of this encounter: 124.4 kg (274 lb 3.2 oz).   Weight management plan: Discussed healthy diet and exercise guidelines          Rebecca Roberts is a 39 year old, presenting for the following health issues:  Hypertension        1/31/2024     9:02 AM   Additional Questions   Roomed by Bhavya TORRES   Accompanied by self     PT: strength training  Walking about a mile now, swimming once or twice a week    Diet: stop eating at night after dinner, increasing veggies. Avoiding simple carbohydrates.         History of Present Illness       Reason for visit:  Completed nurse consult for chest pain via phone on Jan 16, she recommended infollow up in person within 24 hours    She eats 2-3 servings of fruits and vegetables daily.She consumes 0 sweetened beverage(s) daily.She exercises with enough effort to increase her heart rate 10 to 19 minutes per day.  She exercises with enough effort to increase her heart rate 3 or less days per week.   She is taking medications regularly.           Review of Systems  Constitutional, neuro, ENT, endocrine, pulmonary, cardiac, gastrointestinal, genitourinary, musculoskeletal, integument and psychiatric systems are negative, except as otherwise noted.      Objective    BP (!) 152/98   Pulse 104   Temp 98.8  F (37.1  C) (Tympanic)   Resp 20   Ht 1.715 m (5' 7.5\")   Wt 124.4 kg (274 lb 3.2 oz)   LMP 01/05/2024   SpO2 100%   BMI 42.31 kg/m    Body mass index is 42.31 kg/m .  Physical Exam  Constitutional:       General: She is not in acute " distress.     Appearance: Normal appearance.   HENT:      Right Ear: External ear normal.      Left Ear: External ear normal.   Eyes:      Pupils: Pupils are equal, round, and reactive to light.   Cardiovascular:      Rate and Rhythm: Normal rate.      Pulses: Normal pulses.      Heart sounds: Normal heart sounds. No murmur heard.     No friction rub. No gallop.   Pulmonary:      Effort: Pulmonary effort is normal. No respiratory distress.      Breath sounds: No wheezing, rhonchi or rales.   Skin:     General: Skin is warm and dry.   Neurological:      General: No focal deficit present.      Mental Status: She is alert and oriented to person, place, and time.   Psychiatric:         Mood and Affect: Mood normal.         Behavior: Behavior normal.         Thought Content: Thought content normal.         Judgment: Judgment normal.            Office Visit on 11/30/2023   Component Date Value Ref Range Status    Interpretation 11/30/2023 Negative for Intraepithelial Lesion or Malignancy (NILM)    Final    Comment 11/30/2023    Final                    Value:This result contains rich text formatting which cannot be displayed here.    Specimen Adequacy 11/30/2023 Satisfactory for evaluation, endocervical/transformation zone component absent   Final    Clinical Information 11/30/2023    Final                    Value:This result contains rich text formatting which cannot be displayed here.    Reflex Testing 11/30/2023 Yes regardless of result   Final    Previous Abnormal? 11/30/2023    Final                    Value:This result contains rich text formatting which cannot be displayed here.    Performing Labs 11/30/2023    Final                    Value:This result contains rich text formatting which cannot be displayed here.    Cholesterol 11/30/2023 231 (H)  <200 mg/dL Final    Triglycerides 11/30/2023 130  <150 mg/dL Final    Direct Measure HDL 11/30/2023 40 (L)  >=50 mg/dL Final    LDL Cholesterol Calculated 11/30/2023  165 (H)  <=100 mg/dL Final    Non HDL Cholesterol 11/30/2023 191 (H)  <130 mg/dL Final    Sodium 11/30/2023 138  135 - 145 mmol/L Final    Reference intervals for this test were updated on 09/26/2023 to more accurately reflect our healthy population. There may be differences in the flagging of prior results with similar values performed with this method. Interpretation of those prior results can be made in the context of the updated reference intervals.     Potassium 11/30/2023 4.2  3.4 - 5.3 mmol/L Final    Carbon Dioxide (CO2) 11/30/2023 25  22 - 29 mmol/L Final    Anion Gap 11/30/2023 11  7 - 15 mmol/L Final    Urea Nitrogen 11/30/2023 8.2  6.0 - 20.0 mg/dL Final    Creatinine 11/30/2023 0.66  0.51 - 0.95 mg/dL Final    GFR Estimate 11/30/2023 >90  >60 mL/min/1.73m2 Final    Calcium 11/30/2023 9.1  8.6 - 10.0 mg/dL Final    Chloride 11/30/2023 102  98 - 107 mmol/L Final    Glucose 11/30/2023 108 (H)  70 - 99 mg/dL Final    Alkaline Phosphatase 11/30/2023 46  40 - 150 U/L Final    Reference intervals for this test were updated on 11/14/2023 to more accurately reflect our healthy population. There may be differences in the flagging of prior results with similar values performed with this method. Interpretation of those prior results can be made in the context of the updated reference intervals.    AST 11/30/2023 26  0 - 45 U/L Final    Reference intervals for this test were updated on 6/12/2023 to more accurately reflect our healthy population. There may be differences in the flagging of prior results with similar values performed with this method. Interpretation of those prior results can be made in the context of the updated reference intervals.    ALT 11/30/2023 22  0 - 50 U/L Final    Reference intervals for this test were updated on 6/12/2023 to more accurately reflect our healthy population. There may be differences in the flagging of prior results with similar values performed with this method.  Interpretation of those prior results can be made in the context of the updated reference intervals.      Protein Total 11/30/2023 6.8  6.4 - 8.3 g/dL Final    Albumin 11/30/2023 4.3  3.5 - 5.2 g/dL Final    Bilirubin Total 11/30/2023 0.9  <=1.2 mg/dL Final    Hemoglobin A1C 11/30/2023 6.3 (H)  0.0 - 5.6 % Final    Normal <5.7%   Prediabetes 5.7-6.4%    Diabetes 6.5% or higher     Note: Adopted from ADA consensus guidelines.    Other HR HPV 11/30/2023 Negative  Negative Final    HPV16 DNA 11/30/2023 Negative  Negative Final    HPV18 DNA 11/30/2023 Negative  Negative Final    FINAL DIAGNOSIS 11/30/2023    Final                    Value:This result contains rich text formatting which cannot be displayed here.           Signed Electronically by: GLORIA Dash CNP

## 2024-02-15 ENCOUNTER — ALLIED HEALTH/NURSE VISIT (OUTPATIENT)
Dept: FAMILY MEDICINE | Facility: CLINIC | Age: 39
End: 2024-02-15
Payer: COMMERCIAL

## 2024-02-15 ENCOUNTER — LAB (OUTPATIENT)
Dept: LAB | Facility: CLINIC | Age: 39
End: 2024-02-15
Payer: COMMERCIAL

## 2024-02-15 VITALS
WEIGHT: 266 LBS | DIASTOLIC BLOOD PRESSURE: 89 MMHG | SYSTOLIC BLOOD PRESSURE: 138 MMHG | HEART RATE: 78 BPM | BODY MASS INDEX: 41.05 KG/M2

## 2024-02-15 DIAGNOSIS — R03.0 ELEVATED BLOOD PRESSURE READING WITHOUT DIAGNOSIS OF HYPERTENSION: Primary | ICD-10-CM

## 2024-02-15 DIAGNOSIS — I10 BENIGN ESSENTIAL HYPERTENSION: ICD-10-CM

## 2024-02-15 LAB
ANION GAP SERPL CALCULATED.3IONS-SCNC: 11 MMOL/L (ref 7–15)
BUN SERPL-MCNC: 9.2 MG/DL (ref 6–20)
CALCIUM SERPL-MCNC: 11.8 MG/DL (ref 8.6–10)
CHLORIDE SERPL-SCNC: 100 MMOL/L (ref 98–107)
CREAT SERPL-MCNC: 0.75 MG/DL (ref 0.51–0.95)
DEPRECATED HCO3 PLAS-SCNC: 27 MMOL/L (ref 22–29)
EGFRCR SERPLBLD CKD-EPI 2021: >90 ML/MIN/1.73M2
GLUCOSE SERPL-MCNC: 107 MG/DL (ref 70–99)
POTASSIUM SERPL-SCNC: 3.9 MMOL/L (ref 3.4–5.3)
SODIUM SERPL-SCNC: 138 MMOL/L (ref 135–145)

## 2024-02-15 PROCEDURE — 36415 COLL VENOUS BLD VENIPUNCTURE: CPT

## 2024-02-15 PROCEDURE — 99207 PR NO CHARGE NURSE ONLY: CPT

## 2024-02-15 PROCEDURE — 80048 BASIC METABOLIC PNL TOTAL CA: CPT

## 2024-02-15 NOTE — PROGRESS NOTES
I met with Alejandra Moss at the request of Zee Britt to recheck her blood pressure.  Blood pressure medications on the med list were reviewed with patient.    Patient has taken all medications as per usual regimen: Yes  Patient reports tolerating them without any issues or concerns: Yes    Vitals:    02/15/24 1052 02/15/24 1053   BP: (!) 144/102 138/89   Pulse: 78 78   Weight: 120.7 kg (266 lb)        Blood pressure was taken, previous encounter was reviewed, recorded blood pressure below 140/90.  Patient was discharged and the note will be sent to the provider for final review.    MELISSA Mishra

## 2024-02-16 RX ORDER — LISINOPRIL/HYDROCHLOROTHIAZIDE 10-12.5 MG
1 TABLET ORAL DAILY
Qty: 90 TABLET | Refills: 1 | Status: SHIPPED | OUTPATIENT
Start: 2024-02-16 | End: 2024-08-31

## 2024-02-20 ENCOUNTER — THERAPY VISIT (OUTPATIENT)
Dept: PHYSICAL THERAPY | Facility: CLINIC | Age: 39
End: 2024-02-20
Attending: PHYSICIAN ASSISTANT
Payer: COMMERCIAL

## 2024-02-20 DIAGNOSIS — M72.2 PLANTAR FASCIITIS, RIGHT: Primary | ICD-10-CM

## 2024-02-20 PROCEDURE — 97110 THERAPEUTIC EXERCISES: CPT | Mod: GP | Performed by: PHYSICAL THERAPIST

## 2024-02-20 NOTE — PROGRESS NOTES
DISCHARGE  Reason for Discharge: Patient has met all goals.    Equipment Issued: band    Discharge Plan: Patient to continue home program.    Referring Provider:  Kendrick Moon DPM     02/20/24 0500   Appointment Info   Signing clinician's name / credentials Melissa Austin PT LAT [facility]   Visits Used 6   Medical Diagnosis Plantar fasciitis, right   PT Tx Diagnosis Plantar fasciitis, right   Progress Note/Certification   Onset of illness/injury or Date of Surgery 07/01/23   Progress Note Completed Date 02/20/24   GOALS   PT Goals 2;3   PT Goal 1   Goal Identifier Step Down   Goal Description Pt will be able to perform 20 B step downs without any compensation to demonstrate improved mm strength and endurance, without any increase in R foot pain.   Rationale to maximize safety and independence with performance of ADLs and functional tasks;to maximize safety and independence within the community   Target Date 02/03/24   Date Met 02/20/24   PT Goal 2   Goal Identifier HEP   Goal Description Pt will be independent and confident in performing and progressing HEP in order to continue global strengthening and improve tolerance to recreational activities such as playing with daughter.   Rationale to maximize safety and independence with performance of ADLs and functional tasks;to maximize safety and independence within the community   Goal Progress Daily HEP   Target Date 12/11/23   Date Met 12/21/23   PT Goal 3   Goal Identifier New Goal Walk   Goal Description Pt will demonstrate ability to walk for 1 mile without having ankle/leg tightness and pain in order to be more active.   Target Date 02/03/24   Date Met 02/20/24   Subjective Report   Subjective Report Feels she is done with PT. Went to [employer] and it took a few days and fast walking to cause tightness in calf. Feels stronger. Would like to work into running again   Objective Measures   Objective Measures Objective Measure 1;Objective Measure 2   Objective  Measure 1   Objective Measure LEFS   Details 79/80 - significant improvement   Objective Measure 2   Objective Measure MRROM   Details quad and hamstrings: equal bilaterally   Treatment Interventions (PT)   Interventions Therapeutic Procedure/Exercise   Therapeutic Procedure/Exercise   Therapeutic Procedures: strength, endurance, ROM, flexibillity minutes (36082) 39   Ther Proc 1 - Details reviewed home program. Slow progressing into running starting with race walking (demonstrated) and good mechanics. Recommended seeing  at her gym for guidance or other trained professional and pacing was discussed. contiue with home program and added quadruped->full bear crawl and marching with pause level->step ups and multiple angles (PTRX) for core strengthening   Skilled Intervention instruction, demonstration and activity to improve endurance and leg/core strength   Patient Response/Progress no questions   Education   Learner/Method Patient;Listening;Reading;Demonstration;Pictures/Video;No Barriers to Learning   Education Comments plan of care, bear crawl and steps ups, continue with current exercises   Plan   Home program Ankle ROM strengthening, SL Balance, Step Ups, pin and stretch for peroneals, donkey kick, prone hamstring curls, bear crawl and steps ups   Updates to plan of care discharge   Comments   Comments Feels ready to continue with home program independently. No symptoms with walking. She would like to get back into her running and we did discuss importance of technique and slowly progressing. Recommended  who understands running mechanics.   Total Session Time   Timed Code Treatment Minutes 39   Total Treatment Time (sum of timed and untimed services) 39

## 2024-02-26 ENCOUNTER — MYC REFILL (OUTPATIENT)
Dept: FAMILY MEDICINE | Facility: CLINIC | Age: 39
End: 2024-02-26
Payer: COMMERCIAL

## 2024-02-26 DIAGNOSIS — I10 BENIGN ESSENTIAL HYPERTENSION: ICD-10-CM

## 2024-02-26 RX ORDER — LISINOPRIL/HYDROCHLOROTHIAZIDE 10-12.5 MG
1 TABLET ORAL DAILY
Qty: 90 TABLET | Refills: 1 | OUTPATIENT
Start: 2024-02-26

## 2024-02-29 NOTE — TELEPHONE ENCOUNTER
Patient checking on her Zestoretic orders, let patient know that med was filled and already sent to her pharmacy.    Sridevi Ingram,RADN, RN

## 2024-04-20 ENCOUNTER — OFFICE VISIT (OUTPATIENT)
Dept: URGENT CARE | Facility: URGENT CARE | Age: 39
End: 2024-04-20
Payer: COMMERCIAL

## 2024-04-20 VITALS
TEMPERATURE: 98.7 F | BODY MASS INDEX: 41.05 KG/M2 | HEART RATE: 93 BPM | OXYGEN SATURATION: 100 % | RESPIRATION RATE: 18 BRPM | WEIGHT: 266 LBS | DIASTOLIC BLOOD PRESSURE: 84 MMHG | SYSTOLIC BLOOD PRESSURE: 123 MMHG

## 2024-04-20 DIAGNOSIS — J02.0 ACUTE STREPTOCOCCAL PHARYNGITIS: Primary | ICD-10-CM

## 2024-04-20 DIAGNOSIS — R07.0 THROAT PAIN: ICD-10-CM

## 2024-04-20 LAB — DEPRECATED S PYO AG THROAT QL EIA: POSITIVE

## 2024-04-20 PROCEDURE — 99213 OFFICE O/P EST LOW 20 MIN: CPT | Performed by: PHYSICIAN ASSISTANT

## 2024-04-20 PROCEDURE — 87880 STREP A ASSAY W/OPTIC: CPT | Performed by: PHYSICIAN ASSISTANT

## 2024-04-20 RX ORDER — PENICILLIN V POTASSIUM 500 MG/1
500 TABLET, FILM COATED ORAL 2 TIMES DAILY
Qty: 20 TABLET | Refills: 0 | Status: SHIPPED | OUTPATIENT
Start: 2024-04-20 | End: 2024-04-30

## 2024-04-20 NOTE — PROGRESS NOTES
Assessment & Plan     Acute streptococcal pharyngitis  - penicillin V (VEETID) 500 MG tablet; Take 1 tablet (500 mg) by mouth 2 times daily for 10 days    Throat pain  - Streptococcus A Rapid Screen w/Reflex to PCR - Clinic Collect    Penicillin twice daily for 10 days.  We discussed symptomatic measures including fluids, rest, Tylenol, ibuprofen.  Contagious for 24 hours after starting antibiotic, after that time change toothbrush.  Follow-up to be seen if symptoms significantly worsen or fail to improve.    Return in about 1 week (around 4/27/2024) for visit with primary care provider if not improving.     Hedy Watson PA-C  [facility]    Subjective   Alejandra Moss is a 39 year old who presents for the following health issues     Patient presents with:  Pharyngitis: Sore throat since Wednesday night, no tonsils, threw up once      HPI    Alejandra presents to clinic today for evaluation of a sore throat.  Symptoms first began 3 days ago and have been getting worse.  She vomited once last night.  Her daughter recently had strep.      Review of Systems   ROS negative except as stated above.      Objective    /84   Pulse 93   Temp 98.7  F (37.1  C) (Oral)   Resp 18   Wt 120.7 kg (266 lb)   SpO2 100%   BMI 41.05 kg/m    Physical Exam   GENERAL: alert and no distress  EYES: Eyes grossly normal to inspection, PERRL and conjunctivae and sclerae normal  HENT: ear canals and TM's normal, nose and mouth without ulcers or lesions, posterior pharynx and uvula erythematous and edematous, no tonsils  NECK: no adenopathy, no asymmetry, masses, or scars  RESP: lungs clear to auscultation - no rales, rhonchi or wheezes  CV: regular rate and rhythm, normal S1 S2, no S3 or S4, no murmur, click or rub, no peripheral edema    Results for orders placed or performed in visit on 04/20/24   Streptococcus A Rapid Screen w/Reflex to PCR - Clinic Collect     Status: Abnormal    Specimen: Throat;  Swab   Result Value Ref Range    Group A Strep antigen Positive (A) Negative

## 2024-05-31 ENCOUNTER — MYC REFILL (OUTPATIENT)
Dept: FAMILY MEDICINE | Facility: CLINIC | Age: 39
End: 2024-05-31
Payer: COMMERCIAL

## 2024-05-31 DIAGNOSIS — I10 BENIGN ESSENTIAL HYPERTENSION: ICD-10-CM

## 2024-05-31 RX ORDER — LISINOPRIL/HYDROCHLOROTHIAZIDE 10-12.5 MG
1 TABLET ORAL DAILY
Qty: 90 TABLET | Refills: 1 | OUTPATIENT
Start: 2024-05-31

## 2024-08-28 ENCOUNTER — OFFICE VISIT (OUTPATIENT)
Dept: PODIATRY | Facility: OTHER | Age: 39
End: 2024-08-28
Payer: COMMERCIAL

## 2024-08-28 ENCOUNTER — ANCILLARY PROCEDURE (OUTPATIENT)
Dept: GENERAL RADIOLOGY | Facility: OTHER | Age: 39
End: 2024-08-28
Attending: PODIATRIST
Payer: COMMERCIAL

## 2024-08-28 VITALS
BODY MASS INDEX: 41.22 KG/M2 | HEIGHT: 68 IN | WEIGHT: 272 LBS | SYSTOLIC BLOOD PRESSURE: 116 MMHG | DIASTOLIC BLOOD PRESSURE: 82 MMHG

## 2024-08-28 DIAGNOSIS — S93.401A RIGHT ANKLE SPRAIN: ICD-10-CM

## 2024-08-28 DIAGNOSIS — S93.491A SPRAIN OF ANTERIOR TALOFIBULAR LIGAMENT OF RIGHT ANKLE, INITIAL ENCOUNTER: Primary | ICD-10-CM

## 2024-08-28 PROCEDURE — 99213 OFFICE O/P EST LOW 20 MIN: CPT | Performed by: PODIATRIST

## 2024-08-28 PROCEDURE — 73610 X-RAY EXAM OF ANKLE: CPT | Mod: TC | Performed by: RADIOLOGY

## 2024-08-28 ASSESSMENT — PAIN SCALES - GENERAL: PAINLEVEL: MODERATE PAIN (5)

## 2024-08-28 NOTE — PATIENT INSTRUCTIONS
Sturdy and reliable ankle braces are most readily available on line, [employer], or [employer] and delivered for around $15-60.  Health insurance does not pay for this.    Double ankle strap or double ankle support is a good choice for acute ankle sprains or for compression, low volume and moderate stability.  Procare and Swede-O are common brands.          Procare lace up ankle brace or ASO ankle brace is a reasonable choice for long term support where compression is not desired.  However they get a bit bulkier.  These are intended for longer term use.    Tri Lock ankle brace is probably the most stable and intended for long term use, chronic ankle instability and most aggressive activity and movements.  They also consume the most volume in your shoes and may be harder to put on for some patients as there are three straps to wrap rather than two.    Consider:   Compression is desired with new injuries or if you have swelling  Ease of application  Volume of the brace and will it fit in your shoes    For recovery of an acute injury, avoid plastic stays on the side of the brace like the Aircast ankle stirrup as they are very bulky and do not fit in most shoes.  Avoid simple neoprene or compression sleeve. They are bulky and do not provide much compression or stability.     For recent ankle injuries we often use an ankle brace for compression and stability and mostly to prevent re-injury until the patient is able to regain strength and balance and able to perform one footed toe raise and one footed balance, equal bilateral.  Then discontinue its use as it can encourage the ankle to remain weak if used permanently.    For long term deformities and chronic instability the more rigid and thus bulkier braces are most often used to provide more help long term or help a ligament that has been elongated after multiple injuries.

## 2024-08-28 NOTE — LETTER
"8/28/2024      Alejandra Moss  [address]  [place] [address]      Dear Colleague,    Thank you for referring your patient, Alejandra Moss, to the [facility]. Please see a copy of my visit note below.    HPI:  Alejandra Moss is a 39 year old female who is seen in consultation at the request of self.    Pt presents for eval of:   (Onset, Location, L/R, Character, Treatments, Injury if yes)    XR Right ankle 8/28/2024     Onset 8/19/2024, twisted Right ankle while playing soccer with her family, could ambulate. Present with lateral > medial Right ankle pain and swelling and supportive athletic shoes.   Constant swelling, bruising, dull ache, pain 5/10    Ibuprofen, ice, rest, elevation, compression sleeve, ace wrap    Works from home.    ROS:  10 point ROS neg other than the symptoms noted above in the HPI.    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     Obesity      Tonsillitis         PAST SURGICAL HISTORY:   Past Surgical History:   Procedure Laterality Date     ABDOMEN SURGERY      premature, \"intestines not fully developed\"  age 4 y/o     CERCLAGE CERVICAL N/A 9/4/2019    Procedure: CERCLAGE, CERVIX, VAGINAL APPROACH;  Surgeon: Cathryn Bermudez MD;  Location:  L[facility]D     LAPAROTOMY EXPLORATORY       [place] LAP,DIAGNOSTIC ABDOMEN Bilateral 3/19/2019    Procedure: LAPAROSCOPY WITH LYSIS OF ADHESIONS;  Surgeon: Yesika Light MD;  Location: [facility];  Service: Gynecology     SMALL BOWEL RESECTION       TONSILLECTOMY       TONSILLECTOMY, ADENOIDECTOMY ADULT, COMBINED  9/26/2013    Procedure: COMBINED TONSILLECTOMY, ADENOIDECTOMY ADULT;  TONSILLECTOMY ;  Surgeon: Prateek Sifuentes MD;  Location: [facility]" [place]     WRIST SURGERY          MEDICATIONS:   Current Outpatient Medications:      lisinopril-hydrochlorothiazide (ZESTORETIC) 10-12.5 MG tablet, Take 1 tablet by mouth daily for 180 days, Disp: 90 tablet, Rfl: 1     Multiple Vitamin (MULTIVITAMINS PO), , Disp: , Rfl:      vitamin D3 (CHOLECALCIFEROL) 2000 units (50 mcg) tablet, Take 1 tablet by mouth daily, Disp: , Rfl:      ALLERGIES:  No Known Allergies     SOCIAL HISTORY:   Social History     Socioeconomic History     Marital status:      Spouse name: Not on file     Number of children: 1     Years of education: Not on file     Highest education level: Not on file   Occupational History     Not on file   Tobacco Use     Smoking status: Never     Smokeless tobacco: Never   Vaping Use     Vaping status: Never Used   Substance and Sexual Activity     Alcohol use: Yes     Alcohol/week: 1.7 standard drinks of alcohol     Comment: Alcoholic Drinks/day: 1-2drinks per week     Drug use: No     Sexual activity: Yes     Partners: Male     Birth control/protection: None   Other Topics Concern     Parent/sibling w/ CABG, MI or angioplasty before 65F 55M? Yes     Comment: mother   Social History Narrative    ** Merged History Encounter **          Social Determinants of Health     Financial Resource Strain: Low Risk  (1/17/2024)    Financial Resource Strain      Within the past 12 months, have you or your family members you live with been unable to get utilities (heat, electricity) when it was really needed?: No   Food Insecurity: Low Risk  (1/17/2024)    Food Insecurity      Within the past 12 months, did you worry that your food would run out before you got money to buy more?: No      Within the past 12 months, did the food you bought just not last and you didn t have money to get more?: No   Transportation Needs: Low Risk  (1/17/2024)    Transportation Needs      Within the past 12 months, has lack of transportation kept you from medical appointments, getting your  "medicines, non-medical meetings or appointments, work, or from getting things that you need?: No   Physical Activity: Not on file   Stress: Not on file   Social Connections: Not on file   Interpersonal Safety: Low Risk  (1/17/2024)    Interpersonal Safety      Do you feel physically and emotionally safe where you currently live?: Yes      Within the past 12 months, have you been hit, slapped, kicked or otherwise physically hurt by someone?: No      Within the past 12 months, have you been humiliated or emotionally abused in other ways by your partner or ex-partner?: No   Housing Stability: High Risk (1/17/2024)    Housing Stability      Do you have housing? : No      Are you worried about losing your housing?: No        FAMILY HISTORY:   Family History   Problem Relation Age of Onset     Diabetes Mother      Depression Mother      Lupus Mother      Clotting Disorder Mother      Aortic Valve Replacement Mother         repair     Mitral Valve Replacement Mother         repair     Heart Disease Father      Diabetes Father      Hypertension Father      Clotting Disorder Sister      Lupus Sister      Pancreatic Cancer Maternal Grandmother      Heart Disease Maternal Grandfather      Cancer Maternal Grandfather      Heart Disease Maternal Aunt      Obesity Maternal Aunt         EXAM:Vitals: /82 (BP Location: Left arm, Patient Position: Sitting, Cuff Size: Adult Large)   Ht 1.715 m (5' 7.5\")   Wt 123.4 kg (272 lb)   BMI 41.97 kg/m    BMI= Body mass index is 41.97 kg/m .    General appearance: Patient is alert and fully cooperative with history & exam.  No sign of distress is noted during the visit.     Psychiatric: Affect is pleasant & appropriate.  Patient appears motivated to improve health.     Respiratory: Breathing is regular & unlabored while sitting.     HEENT: Hearing is intact to spoken word.  Speech is clear.  No gross evidence of visual impairment that would impact ambulation.     Vascular: DP & PT " pulses are intact & regular bilaterally.  No significant edema or varicosities noted.  CFT and skin temperature is normal to both lower extremities.     Neurologic: Lower extremity sensation is intact to light touch.  No evidence of weakness or contracture in the lower extremities.  No evidence of neuropathy.    Dermatologic: Skin is intact to both lower extremities with adequate texture, turgor and tone about the integument.  No paronychia or evidence of soft tissue infection is noted.     Musculoskeletal: Patient is ambulatory without assistive device or brace.  Edema and discomfort directly over the ATF ligament of the right ankle.  Negative drawer maneuver.  There is subtle crepitus bilateral ankles.  Manual muscle strength is 5/5 to all 4 quadrants.  No peroneal subluxation noted.  Discomfort when performing extension of the ankle.  Good    Radiographs: 3 views right ankle 8/28/2024 demonstrate no acute fracture.  Loose body noted about the medial malleolus from previous injury this appears to be chronic.  Very subtle abnormality in the bone morphology of the lateral ankle as well however this appears to be chronic.  No acute cortical reaction or joint diastases.        ASSESSMENT:       ICD-10-CM    1. Sprain of anterior talofibular ligament of right ankle, initial encounter  S93.491A Physical Therapy  Referral           PLAN:  Reviewed patient's chart in [facility].      8/28/2024   Obtained and interpreted radiographs   Recommend right ankle double ankle compression brace for all WB qactivities for about 6-8 weeks  Order to begin PT progress as tolerated  RTC 3-4 weeks with any continued symptoms or questions otherwise as needed.      Kendrick Moon DPM        Again, thank you for allowing me to participate in the care of your patient.        Sincerely,        Kendrick Moon DPM

## 2024-08-28 NOTE — PROGRESS NOTES
"HPI:  Alejandra Moss is a 39 year old female who is seen in consultation at the request of self.    Pt presents for eval of:   (Onset, Location, L/R, Character, Treatments, Injury if yes)    XR Right ankle 8/28/2024     Onset 8/19/2024, twisted Right ankle while playing soccer with her family, could ambulate. Present with lateral > medial Right ankle pain and swelling and supportive athletic shoes.   Constant swelling, bruising, dull ache, pain 5/10    Ibuprofen, ice, rest, elevation, compression sleeve, ace wrap    Works from home.    ROS:  10 point ROS neg other than the symptoms noted above in the HPI.    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    Obesity     Tonsillitis         PAST SURGICAL HISTORY:   Past Surgical History:   Procedure Laterality Date    ABDOMEN SURGERY      premature, \"intestines not fully developed\"  age 4 y/o    CERCLAGE CERVICAL N/A 9/4/2019    Procedure: CERCLAGE, CERVIX, VAGINAL APPROACH;  Surgeon: Cathryn Bermudez MD;  Location: [facility]    LAPAROTOMY EXPLORATORY      [place] LAP,DIAGNOSTIC ABDOMEN Bilateral 3/19/2019    Procedure: LAPAROSCOPY WITH LYSIS OF ADHESIONS;  Surgeon: Yesika Light MD;  Location: [facility];  Service: Gynecology    SMALL BOWEL RESECTION      TONSILLECTOMY      TONSILLECTOMY, ADENOIDECTOMY ADULT, COMBINED  9/26/2013    Procedure: COMBINED TONSILLECTOMY, ADENOIDECTOMY ADULT;  TONSILLECTOMY ;  Surgeon: Prateek Sifuentes MD;  Location: [facility]    WRIST SURGERY          MEDICATIONS:   Current Outpatient Medications:     lisinopril-hydrochlorothiazide (ZESTORETIC) 10-12.5 MG tablet, Take 1 tablet by mouth daily for 180 days, Disp: 90 tablet, Rfl: 1    Multiple Vitamin (MULTIVITAMINS PO), , Disp: " , Rfl:     vitamin D3 (CHOLECALCIFEROL) 2000 units (50 mcg) tablet, Take 1 tablet by mouth daily, Disp: , Rfl:      ALLERGIES:  No Known Allergies     SOCIAL HISTORY:   Social History     Socioeconomic History    Marital status:      Spouse name: Not on file    Number of children: 1    Years of education: Not on file    Highest education level: Not on file   Occupational History    Not on file   Tobacco Use    Smoking status: Never    Smokeless tobacco: Never   Vaping Use    Vaping status: Never Used   Substance and Sexual Activity    Alcohol use: Yes     Alcohol/week: 1.7 standard drinks of alcohol     Comment: Alcoholic Drinks/day: 1-2drinks per week    Drug use: No    Sexual activity: Yes     Partners: Male     Birth control/protection: None   Other Topics Concern    Parent/sibling w/ CABG, MI or angioplasty before 65F 55M? Yes     Comment: mother   Social History Narrative    ** Merged History Encounter **          Social Determinants of Health     Financial Resource Strain: Low Risk  (1/17/2024)    Financial Resource Strain     Within the past 12 months, have you or your family members you live with been unable to get utilities (heat, electricity) when it was really needed?: No   Food Insecurity: Low Risk  (1/17/2024)    Food Insecurity     Within the past 12 months, did you worry that your food would run out before you got money to buy more?: No     Within the past 12 months, did the food you bought just not last and you didn t have money to get more?: No   Transportation Needs: Low Risk  (1/17/2024)    Transportation Needs     Within the past 12 months, has lack of transportation kept you from medical appointments, getting your medicines, non-medical meetings or appointments, work, or from getting things that you need?: No   Physical Activity: Not on file   Stress: Not on file   Social Connections: Not on file   Interpersonal Safety: Low Risk  (1/17/2024)    Interpersonal Safety     Do you feel  "physically and emotionally safe where you currently live?: Yes     Within the past 12 months, have you been hit, slapped, kicked or otherwise physically hurt by someone?: No     Within the past 12 months, have you been humiliated or emotionally abused in other ways by your partner or ex-partner?: No   Housing Stability: High Risk (1/17/2024)    Housing Stability     Do you have housing? : No     Are you worried about losing your housing?: No        FAMILY HISTORY:   Family History   Problem Relation Age of Onset    Diabetes Mother     Depression Mother     Lupus Mother     Clotting Disorder Mother     Aortic Valve Replacement Mother         repair    Mitral Valve Replacement Mother         repair    Heart Disease Father     Diabetes Father     Hypertension Father     Clotting Disorder Sister     Lupus Sister     Pancreatic Cancer Maternal Grandmother     Heart Disease Maternal Grandfather     Cancer Maternal Grandfather     Heart Disease Maternal Aunt     Obesity Maternal Aunt         EXAM:Vitals: /82 (BP Location: Left arm, Patient Position: Sitting, Cuff Size: Adult Large)   Ht 1.715 m (5' 7.5\")   Wt 123.4 kg (272 lb)   BMI 41.97 kg/m    BMI= Body mass index is 41.97 kg/m .    General appearance: Patient is alert and fully cooperative with history & exam.  No sign of distress is noted during the visit.     Psychiatric: Affect is pleasant & appropriate.  Patient appears motivated to improve health.     Respiratory: Breathing is regular & unlabored while sitting.     HEENT: Hearing is intact to spoken word.  Speech is clear.  No gross evidence of visual impairment that would impact ambulation.     Vascular: DP & PT pulses are intact & regular bilaterally.  No significant edema or varicosities noted.  CFT and skin temperature is normal to both lower extremities.     Neurologic: Lower extremity sensation is intact to light touch.  No evidence of weakness or contracture in the lower extremities.  No evidence " of neuropathy.    Dermatologic: Skin is intact to both lower extremities with adequate texture, turgor and tone about the integument.  No paronychia or evidence of soft tissue infection is noted.     Musculoskeletal: Patient is ambulatory without assistive device or brace.  Edema and discomfort directly over the ATF ligament of the right ankle.  Negative drawer maneuver.  There is subtle crepitus bilateral ankles.  Manual muscle strength is 5/5 to all 4 quadrants.  No peroneal subluxation noted.  Discomfort when performing extension of the ankle.  Good    Radiographs: 3 views right ankle 8/28/2024 demonstrate no acute fracture.  Loose body noted about the medial malleolus from previous injury this appears to be chronic.  Very subtle abnormality in the bone morphology of the lateral ankle as well however this appears to be chronic.  No acute cortical reaction or joint diastases.        ASSESSMENT:       ICD-10-CM    1. Sprain of anterior talofibular ligament of right ankle, initial encounter  S93.491A Physical Therapy  Referral           PLAN:  Reviewed patient's chart in [facility].      8/28/2024   Obtained and interpreted radiographs   Recommend right ankle double ankle compression brace for all WB qactivities for about 6-8 weeks  Order to begin PT progress as tolerated  RTC 3-4 weeks with any continued symptoms or questions otherwise as needed.      Kendrick Moon DPM

## 2024-08-31 ENCOUNTER — MYC REFILL (OUTPATIENT)
Dept: FAMILY MEDICINE | Facility: CLINIC | Age: 39
End: 2024-08-31
Payer: COMMERCIAL

## 2024-08-31 DIAGNOSIS — I10 BENIGN ESSENTIAL HYPERTENSION: ICD-10-CM

## 2024-09-02 RX ORDER — LISINOPRIL/HYDROCHLOROTHIAZIDE 10-12.5 MG
1 TABLET ORAL DAILY
Qty: 90 TABLET | Refills: 1 | Status: SHIPPED | OUTPATIENT
Start: 2024-09-02

## 2024-09-17 ENCOUNTER — PATIENT OUTREACH (OUTPATIENT)
Dept: FAMILY MEDICINE | Facility: CLINIC | Age: 39
End: 2024-09-17
Payer: COMMERCIAL

## 2024-09-17 NOTE — TELEPHONE ENCOUNTER
Patient Quality Outreach    Patient is due for the following:   Physical Preventive Adult Physical,  - Due after 11/30/2024      Topic Date Due    Hepatitis B Vaccine (2 of 3 - 19+ 3-dose series) 12/28/2023    Flu Vaccine (1) 09/01/2024    COVID-19 Vaccine (6 - 2024-25 season) 09/01/2024       Next Steps:   Patient has upcoming appointment, these items will be addressed at that time.    Type of outreach:    Chart review performed, no outreach needed.      Questions for provider review:    None           OLI PHELPS MA

## 2024-09-18 ENCOUNTER — OFFICE VISIT (OUTPATIENT)
Dept: DERMATOLOGY | Facility: CLINIC | Age: 39
End: 2024-09-18
Payer: COMMERCIAL

## 2024-09-18 DIAGNOSIS — L82.1 SEBORRHEIC KERATOSIS: ICD-10-CM

## 2024-09-18 DIAGNOSIS — L81.4 LENTIGO: ICD-10-CM

## 2024-09-18 DIAGNOSIS — L73.9 FOLLICULITIS: Primary | ICD-10-CM

## 2024-09-18 DIAGNOSIS — D22.9 MULTIPLE BENIGN NEVI: ICD-10-CM

## 2024-09-18 DIAGNOSIS — D18.01 CHERRY ANGIOMA: ICD-10-CM

## 2024-09-18 PROCEDURE — G2211 COMPLEX E/M VISIT ADD ON: HCPCS | Performed by: PHYSICIAN ASSISTANT

## 2024-09-18 PROCEDURE — 99213 OFFICE O/P EST LOW 20 MIN: CPT | Performed by: PHYSICIAN ASSISTANT

## 2024-09-18 RX ORDER — CLINDAMYCIN PHOSPHATE 10 UG/ML
LOTION TOPICAL
Qty: 60 ML | Refills: 4 | Status: SHIPPED | OUTPATIENT
Start: 2024-09-18

## 2024-09-18 ASSESSMENT — ACTIVITIES OF DAILY LIVING (ADL)
PERFORMING_HEAVY_ACTIVITIES_AROUND_YOUR_HOME: A LITTLE BIT OF DIFFICULTY
LIFTING_AN_OBJECT,_LIKE_A_BAG_OF_GROCERIES_FROM_THE_FLOOR: NO DIFFICULTY
WALKING_BETWEEN_ROOMS: NO DIFFICULTY
GETTING_INTO_OR_OUT_OF_A_CAR: NO DIFFICULTY
SHOPPING: A LITTLE BIT OF DIFFICULTY
SQUATTING: A LITTLE BIT OF DIFFICULTY
RUNNING_ON_UNEVEN_GROUND: A LITTLE BIT OF DIFFICULTY
GOING_UP_OR_DOWN_10_STAIRS: A LITTLE BIT OF DIFFICULTY
PUTTING_ON_YOUR_SHOES_OR_SOCKS: A LITTLE BIT OF DIFFICULTY
WALKING_2_BLOCKS: NO DIFFICULTY
LEFS_SCORE(%): 0
STANDING_FOR_1_HOUR: NO DIFFICULTY
GETTING_INTO_AND_OUT_OF_A_BATH: A LITTLE BIT OF DIFFICULTY
PERFORMING_LIGHT_ACTIVITIES_AROUND_YOUR_HOME: NO DIFFICULTY
SITTING_FOR_1_HOUR: NO DIFFICULTY
RUNNING_ON_EVEN_GROUND: NO DIFFICULTY
ANY_OF_YOUR_USUAL_WORK,_HOUSEWORK_OR_SCHOOL_ACTIVITIES: A LITTLE BIT OF DIFFICULTY
PLEASE_INDICATE_YOR_PRIMARY_REASON_FOR_REFERRAL_TO_THERAPY:: FOOT AND/OR ANKLE
LEFS_RAW_SCORE: 0
WALKING_A_MILE: NO DIFFICULTY
MAKING_SHARP_TURNS_WHILE_RUNNING_FAST: A LITTLE BIT OF DIFFICULTY
YOUR_USUAL_HOBBIES,_RECREATIONAL_OR_SPORTING_ACTIVITIES: MODERATE DIFFICULTY
ROLLING_OVER_IN_BED: NO DIFFICULTY

## 2024-09-18 NOTE — PATIENT INSTRUCTIONS
Proper skin care from [facility]:    -Eliminate harsh soaps as they strip the natural oils from the skin, often resulting in dry itchy skin ( i.e. Dial, Zest, [place])  -Use mild soaps such as Cetaphil or Dove Sensitive Skin in the shower. You do not need to use soap on arms, legs, and trunk every time you shower unless visibly soiled.   -Avoid hot or cold showers.  -After showering, lightly dry off and apply moisturizing within 2-3 minutes. This will help trap moisture in the skin.   -Aggressive use of a moisturizer at least 1-2 times a day to the entire body (including -Vanicream, Cetaphil, Aquaphor or Cerave) and moisturize hands after every washing.  -We recommend using moisturizers that come in a tub that needs to be scooped out, not a pump. This has more of an oil base. It will hold moisture in your skin much better than a water base moisturizer. The above recommended are non-pore clogging.      Wear a sunscreen with at least SPF 30 on your face, ears, neck and V of the chest daily. Wear sunscreen on other areas of the body if those areas are exposed to the sun throughout the day. Sunscreens can contain physical and/or chemical blockers. Physical blockers are less likely to clog pores, these include zinc oxide and titanium dioxide. Reapply every two hour and after swimming.     Sunscreen examples: https://www.ewg.org/sunscreen/    UV radiation  UVA radiation remains constant throughout the day and throughout the year. It is a longer wavelength than UVB and therefore penetrates deeper into the skin leading to immediate and delayed tanning, photoaging, and skin cancer. 70-80% of UVA and UVB radiation occurs between the hours of 10am-2pm.  UVB radiation  UVB radiation causes the most harmful effects and is more significant during the summer months. However, snow and ice can reflect UVB radiation leading to skin damage during the winter months as well. UVB radiation is responsible for tanning,  burning, inflammation, delayed erythema (pinkness), pigmentation (brown spots), and skin cancer.     I recommend self monthly full body exams and yearly full body exams with a dermatology provider. If you develop a new or changing lesion please follow up for examination. Most skin cancers are pink and scaly or pink and pearly. However, we do see blue/brown/black skin cancers.  Consider the ABCDEs of melanoma when giving yourself your monthly full body exam ( don't forget the groin, buttocks, feet, toes, etc). A-asymmetry, B-borders, C-color, D-diameter, E-elevation or evolving. If you see any of these changes please follow up in clinic. If you cannot see your back I recommend purchasing a hand held mirror to use with a larger wall mirror.       Checking for Skin Cancer  You can find cancer early by checking your skin each month. There are 3 kinds of skin cancer. They are melanoma, basal cell carcinoma, and squamous cell carcinoma. Doing monthly skin checks is the best way to find new marks or skin changes. Follow the instructions below for checking your skin.   The ABCDEs of checking moles for melanoma   Check your moles or growths for signs of melanoma using ABCDE:   Asymmetry: the sides of the mole or growth don t match  Border: the edges are ragged, notched, or blurred  Color: the color within the mole or growth varies  Diameter: the mole or growth is larger than 6 mm (size of a pencil eraser)  Evolving: the size, shape, or color of the mole or growth is changing (evolving is not shown in the images below)    Checking for other types of skin cancer  Basal cell carcinoma or squamous cell carcinoma have symptoms such as:     A spot or mole that looks different from all other marks on your skin  Changes in how an area feels, such as itching, tenderness, or pain  Changes in the skin's surface, such as oozing, bleeding, or scaliness  A sore that does not heal  New swelling or redness beyond the border of a  mole    Who s at risk?  Anyone can get skin cancer. But you are at greater risk if you have:   Fair skin, light-colored hair, or light-colored eyes  Many moles or abnormal moles on your skin  A history of sunburns from sunlight or tanning beds  A family history of skin cancer  A history of exposure to radiation or chemicals  A weakened immune system  If you have had skin cancer in the past, you are at risk for recurring skin cancer.   How to check your skin  Do your monthly skin checkups in front of a full-length mirror. Check all parts of your body, including your:   Head (ears, face, neck, and scalp)  Torso (front, back, and sides)  Arms (tops, undersides, upper, and lower armpits)  Hands (palms, backs, and fingers, including under the nails)  Buttocks and genitals  Legs (front, back, and sides)  Feet (tops, soles, toes, including under the nails, and between toes)  If you have a lot of moles, take digital photos of them each month. Make sure to take photos both up close and from a distance. These can help you see if any moles change over time.   Most skin changes are not cancer. But if you see any changes in your skin, call your doctor right away. Only he or she can diagnose a problem. If you have skin cancer, seeing your doctor can be the first step toward getting the treatment that could save your life.   [employer] last reviewed this educational content on 4/1/2019 2000-2020 [employer]. [address] [place] [address], [place] [address]. All rights reserved. This information is not intended as a substitute for professional medical care. Always follow your healthcare professional's instructions.       When should I call my doctor?  If you are worsening or not improving, please, contact us or seek urgent care as noted below.     Who should I call with questions (adults)?    [facility] [phone]  For urgent needs outside of business hours call the [facility] at  [phone] and ask for the dermatology resident on call to be paged  If this is a medical emergency and you are unable to reach an ER, Call 911      If you need a prescription refill, please contact your pharmacy. Refills are approved or denied by our Physicians during normal business hours, Monday through Fridays  Per office policy, refills will not be granted if you have not been seen within the past year (or sooner depending on your child's condition)

## 2024-09-18 NOTE — LETTER
"9/18/2024      Alejandra Moss  [address] [place] [address]      Dear Colleague,    Thank you for referring your patient, Alejandra Moss, to the [facility]. Please see a copy of my visit note below.    Alejandra Moss is a pleasant 39 year old year old female patient here today for skin check. She notes more pimple like areas on buttocks. Patient has no other skin complaints today.  Remainder of the HPI, Meds, PMH, Allergies, FH, and SH was reviewed in chart.    Pertinent Hx:  No personal history of skin cancer. Family history of precancerous areas.   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     Obesity      Tonsillitis        Past Surgical History:   Procedure Laterality Date     ABDOMEN SURGERY      premature, \"intestines not fully developed\"  age 4 y/o     CERCLAGE CERVICAL N/A 9/4/2019    Procedure: CERCLAGE, CERVIX, VAGINAL APPROACH;  Surgeon: Cathryn Bermudez MD;  Location: [facility]     LAPAROTOMY EXPLORATORY       MI LAP,DIAGNOSTIC ABDOMEN Bilateral 3/19/2019    Procedure: LAPAROSCOPY WITH LYSIS OF ADHESIONS;  Surgeon: Yesika Light MD;  Location: [facility];  Service: Gynecology     SMALL BOWEL RESECTION       TONSILLECTOMY       TONSILLECTOMY, ADENOIDECTOMY ADULT, COMBINED  9/26/2013    Procedure: COMBINED TONSILLECTOMY, ADENOIDECTOMY ADULT;  TONSILLECTOMY ;  Surgeon: Prateek Sifuentes MD;  Location: [facility]          Family History   Problem Relation Age of Onset     Diabetes Mother      Depression Mother      Lupus Mother      Clotting Disorder Mother      Aortic Valve Replacement Mother         repair     Mitral Valve Replacement Mother         repair     Heart Disease Father      Diabetes Father      Hypertension Father      Clotting Disorder Sister      Lupus Sister      Pancreatic Cancer Maternal Grandmother      " Heart Disease Maternal Grandfather      Cancer Maternal Grandfather      Heart Disease Maternal Aunt      Obesity Maternal Aunt        Social History     Socioeconomic History     Marital status:      Spouse name: Not on file     Number of children: 1     Years of education: Not on file     Highest education level: Not on file   Occupational History     Not on file   Tobacco Use     Smoking status: Never     Smokeless tobacco: Never   Vaping Use     Vaping status: Never Used   Substance and Sexual Activity     Alcohol use: Yes     Alcohol/week: 1.7 standard drinks of alcohol     Comment: Alcoholic Drinks/day: 1-2drinks per week     Drug use: No     Sexual activity: Yes     Partners: Male     Birth control/protection: None   Other Topics Concern     Parent/sibling w/ CABG, MI or angioplasty before 65F 55M? Yes     Comment: mother   Social History Narrative    ** Merged History Encounter **          Social Determinants of Health     Financial Resource Strain: Low Risk  (1/17/2024)    Financial Resource Strain      Within the past 12 months, have you or your family members you live with been unable to get utilities (heat, electricity) when it was really needed?: No   Food Insecurity: Low Risk  (1/17/2024)    Food Insecurity      Within the past 12 months, did you worry that your food would run out before you got money to buy more?: No      Within the past 12 months, did the food you bought just not last and you didn t have money to get more?: No   Transportation Needs: Low Risk  (1/17/2024)    Transportation Needs      Within the past 12 months, has lack of transportation kept you from medical appointments, getting your medicines, non-medical meetings or appointments, work, or from getting things that you need?: No   Physical Activity: Not on file   Stress: Not on file   Social Connections: Not on file   Interpersonal Safety: Low Risk  (1/17/2024)    Interpersonal Safety      Do you feel physically and  emotionally safe where you currently live?: Yes      Within the past 12 months, have you been hit, slapped, kicked or otherwise physically hurt by someone?: No      Within the past 12 months, have you been humiliated or emotionally abused in other ways by your partner or ex-partner?: No   Housing Stability: High Risk (1/17/2024)    Housing Stability      Do you have housing? : No      Are you worried about losing your housing?: No       Outpatient Encounter Medications as of 9/18/2024   Medication Sig Dispense Refill     clindamycin (CLEOCIN T) 1 % external lotion Apply twice daily to affect areas. 60 mL 4     lisinopril-hydrochlorothiazide (ZESTORETIC) 10-12.5 MG tablet Take 1 tablet by mouth daily. 90 tablet 1     Multiple Vitamin (MULTIVITAMINS PO)        vitamin D3 (CHOLECALCIFEROL) 2000 units (50 mcg) tablet Take 1 tablet by mouth daily       No facility-administered encounter medications on file as of 9/18/2024.             O:   NAD, WDWN, Alert & Oriented, Mood & Affect wnl, Vitals stable   Here today alone   There were no vitals taken for this visit.   General appearance normal   Vitals stable   Alert, oriented and in no acute distress     Few inflammatory on back, buttock   Red papules on trunk  Brown papules and macules with regular pigment network and borders on torso and extremities  Brown pigment plaque 5.5 x 4 cm on back consistent with congenital nevus, picture taken   The remainder of skin exam is normal       Eyes: Conjunctivae/lids:Normal     ENT: Lips: normal    MSK:Normal    Cardiovascular: peripheral edema none    Pulm: Breathing Normal    Neuro/Psych: Orientation:Alert and Orientedx3 ; Mood/Affect:normal   A/P:  1. Folliculitis   Recommend hibilens.   Apply clindamycin lotion 1-2 times daily.   3. Benign nevi, cherry angioma, seborrheic keratosis   BENIGN LESIONS DISCUSSED WITH PATIENT:  I discussed the specifics of tumor, prognosis, and genetics of benign lesions.  I explained that treatment  of these lesions would be purely cosmetic and not medically neccessary.  I discussed with patient different removal options including excision, cautery and /or laser.      Nature and genetics of benign skin lesions dicussed with patient.  Signs and Symptoms of skin cancer discussed with patient.  ABCDEs of melanoma reviewed with patient.  Patient encouraged to perform monthly skin exams.  UV precautions reviewed with patient.  Risks of non-melanoma skin cancer discussed with patient   Return to clinic in one year or sooner if needed.       Again, thank you for allowing me to participate in the care of your patient.        Sincerely,        Mary Viveros PA-C

## 2024-09-19 ENCOUNTER — THERAPY VISIT (OUTPATIENT)
Dept: PHYSICAL THERAPY | Facility: CLINIC | Age: 39
End: 2024-09-19
Attending: PODIATRIST
Payer: COMMERCIAL

## 2024-09-19 DIAGNOSIS — G89.29 CHRONIC PAIN OF RIGHT ANKLE: Primary | ICD-10-CM

## 2024-09-19 DIAGNOSIS — S93.491A SPRAIN OF ANTERIOR TALOFIBULAR LIGAMENT OF RIGHT ANKLE, INITIAL ENCOUNTER: ICD-10-CM

## 2024-09-19 DIAGNOSIS — M25.571 CHRONIC PAIN OF RIGHT ANKLE: Primary | ICD-10-CM

## 2024-09-19 PROCEDURE — 97161 PT EVAL LOW COMPLEX 20 MIN: CPT | Mod: GP

## 2024-09-19 PROCEDURE — 97140 MANUAL THERAPY 1/> REGIONS: CPT | Mod: GP

## 2024-09-19 PROCEDURE — 97110 THERAPEUTIC EXERCISES: CPT | Mod: GP

## 2024-09-19 NOTE — PROGRESS NOTES
"Alejandra Moss is a pleasant 39 year old year old female patient here today for skin check. She notes more pimple like areas on buttocks. Patient has no other skin complaints today.  Remainder of the HPI, Meds, PMH, Allergies, FH, and SH was reviewed in chart.    Pertinent Hx:  No personal history of skin cancer. Family history of precancerous areas.   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    Obesity     Tonsillitis        Past Surgical History:   Procedure Laterality Date    ABDOMEN SURGERY      premature, \"intestines not fully developed\"  age 4 y/o    CERCLAGE CERVICAL N/A 9/4/2019    Procedure: CERCLAGE, CERVIX, VAGINAL APPROACH;  Surgeon: Cathryn Bermudez MD;  Location: [facility]    LAPAROTOMY EXPLORATORY      NE LAP,DIAGNOSTIC ABDOMEN Bilateral 3/19/2019    Procedure: LAPAROSCOPY WITH LYSIS OF ADHESIONS;  Surgeon: Yesika Light MD;  Location: [facility];  Service: Gynecology    SMALL BOWEL RESECTION      TONSILLECTOMY      TONSILLECTOMY, ADENOIDECTOMY ADULT, COMBINED  9/26/2013    Procedure: COMBINED TONSILLECTOMY, ADENOIDECTOMY ADULT;  TONSILLECTOMY ;  Surgeon: Prateek Sifuentes MD;  Location[facility]    WRIST SURGERY          Family History   Problem Relation Age of Onset    Diabetes Mother     Depression Mother     Lupus Mother     Clotting Disorder Mother     Aortic Valve Replacement Mother         repair    Mitral Valve Replacement Mother         repair    Heart Disease Father     Diabetes Father     Hypertension Father     Clotting Disorder Sister     Lupus Sister     Pancreatic Cancer Maternal Grandmother     Heart Disease Maternal Grandfather     Cancer Maternal Grandfather     Heart Disease Maternal Aunt     Obesity Maternal Aunt        Social History     Socioeconomic History    Marital status:      Spouse name: Not on file    Number of children: 1    Years of " education: Not on file    Highest education level: Not on file   Occupational History    Not on file   Tobacco Use    Smoking status: Never    Smokeless tobacco: Never   Vaping Use    Vaping status: Never Used   Substance and Sexual Activity    Alcohol use: Yes     Alcohol/week: 1.7 standard drinks of alcohol     Comment: Alcoholic Drinks/day: 1-2drinks per week    Drug use: No    Sexual activity: Yes     Partners: Male     Birth control/protection: None   Other Topics Concern    Parent/sibling w/ CABG, MI or angioplasty before 65F 55M? Yes     Comment: mother   Social History Narrative    ** Merged History Encounter **          Social Determinants of Health     Financial Resource Strain: Low Risk  (1/17/2024)    Financial Resource Strain     Within the past 12 months, have you or your family members you live with been unable to get utilities (heat, electricity) when it was really needed?: No   Food Insecurity: Low Risk  (1/17/2024)    Food Insecurity     Within the past 12 months, did you worry that your food would run out before you got money to buy more?: No     Within the past 12 months, did the food you bought just not last and you didn t have money to get more?: No   Transportation Needs: Low Risk  (1/17/2024)    Transportation Needs     Within the past 12 months, has lack of transportation kept you from medical appointments, getting your medicines, non-medical meetings or appointments, work, or from getting things that you need?: No   Physical Activity: Not on file   Stress: Not on file   Social Connections: Not on file   Interpersonal Safety: Low Risk  (1/17/2024)    Interpersonal Safety     Do you feel physically and emotionally safe where you currently live?: Yes     Within the past 12 months, have you been hit, slapped, kicked or otherwise physically hurt by someone?: No     Within the past 12 months, have you been humiliated or emotionally abused in other ways by your partner or ex-partner?: No   Housing  Stability: High Risk (1/17/2024)    Housing Stability     Do you have housing? : No     Are you worried about losing your housing?: No       Outpatient Encounter Medications as of 9/18/2024   Medication Sig Dispense Refill    clindamycin (CLEOCIN T) 1 % external lotion Apply twice daily to affect areas. 60 mL 4    lisinopril-hydrochlorothiazide (ZESTORETIC) 10-12.5 MG tablet Take 1 tablet by mouth daily. 90 tablet 1    Multiple Vitamin (MULTIVITAMINS PO)       vitamin D3 (CHOLECALCIFEROL) 2000 units (50 mcg) tablet Take 1 tablet by mouth daily       No facility-administered encounter medications on file as of 9/18/2024.             O:   NAD, WDWN, Alert & Oriented, Mood & Affect wnl, Vitals stable   Here today alone   There were no vitals taken for this visit.   General appearance normal   Vitals stable   Alert, oriented and in no acute distress     Few inflammatory on back, buttock   Red papules on trunk  Brown papules and macules with regular pigment network and borders on torso and extremities  Brown pigment plaque 5.5 x 4 cm on back consistent with congenital nevus, picture taken   The remainder of skin exam is normal       Eyes: Conjunctivae/lids:Normal     ENT: Lips: normal    MSK:Normal    Cardiovascular: peripheral edema none    Pulm: Breathing Normal    Neuro/Psych: Orientation:Alert and Orientedx3 ; Mood/Affect:normal   A/P:  1. Folliculitis   Recommend hibilens.   Apply clindamycin lotion 1-2 times daily.   3. Benign nevi, cherry angioma, seborrheic keratosis   BENIGN LESIONS DISCUSSED WITH PATIENT:  I discussed the specifics of tumor, prognosis, and genetics of benign lesions.  I explained that treatment of these lesions would be purely cosmetic and not medically neccessary.  I discussed with patient different removal options including excision, cautery and /or laser.      Nature and genetics of benign skin lesions dicussed with patient.  Signs and Symptoms of skin cancer discussed with patient.  Jade  of melanoma reviewed with patient.  Patient encouraged to perform monthly skin exams.  UV precautions reviewed with patient.  Risks of non-melanoma skin cancer discussed with patient   Return to clinic in one year or sooner if needed.

## 2024-09-19 NOTE — PROGRESS NOTES
PHYSICAL THERAPY EVALUATION  Type of Visit: Evaluation       Fall Risk Screen:  Fall screen completed by: PT  Have you fallen 2 or more times in the past year?: No  Have you fallen and had an injury in the past year?: No  Is patient a fall risk?: No    Subjective     Pt reports to physical therapy one month after R ankle sprain. She was playing soccer with daughter and rolled it hard into inversion. She has still been jogging with a brace on. There was some bruising and swelling at onset of injury. Sprain was on 8/19/24. She has had a lot of previous sprains before on both ankles while playing soccer, which she stopped playing after high school. She stated that she was in PT last year for foot pain. Walking down the stairs, she has to rely more on railing.         Presenting condition or subjective complaint: Rehab for right ankle sorain  Date of onset: 08/28/24    Relevant medical history: High blood pressure   Dates & types of surgery:      Prior diagnostic imaging/testing results: X-ray     Prior therapy history for the same diagnosis, illness or injury: Yes Pt last year    Prior Level of Function  Transfers: Independent  Ambulation: Independent  ADL: Independent  IADL:     Living Environment  Social support: With a significant other or spouse   Type of home: House; Multi-level   Stairs to enter the home: Yes       Ramp: No   Stairs inside the home: Yes 6 Is there a railing: Yes     Help at home: None  Equipment owned:       Employment: Yes   Hobbies/Interests: Jogging and soccer    Patient goals for therapy: Increase strength    Pain assessment: See objective evaluation for additional pain details     Objective   FOOT/ANKLE EVALUATION  PAIN: Pain Level at Rest: 0/10  Pain Level with Use: 6/10  Pain Location: ankle, lateral to front of ankle  Pain Quality: Sharp  Pain Frequency: intermittent  Pain is Worst: none  Pain is Exacerbated By: weird movements  Pain is Relieved By: cold and NSAIDs  Pain  Progression: Improved    INTEGUMENTARY (edema, incisions):   POSTURE:   GAIT:   Weightbearing Status:   Assistive Device(s):   Gait Deviations:   BALANCE/PROPRIOCEPTION:   SLS eyes open: 30 sec bilaterally   SLS eyes closed: 4 sec R, 5 sec L  Tandem gait: good control    WEIGHT BEARING ALIGNMENT:   NON-WEIGHTBEARING ALIGNMENT:    ROM:     STRENGTH:   DF: 5 R  Inversion: 4 R  Eversion: 5 R  Calf raises: 2 on R before calf cramping    FLEXIBILITY:   SPECIAL TESTS:   Anterior drawer test: neg, same end feel bilaterally   Talar tilt test: tight into inversion    FUNCTIONAL TESTS:   PALPATION:   Minimal swelling at lateral malleolus  JOINT MOBILITY:   Posterior glide of fibula: hypomobile  Sustained posterior glide with ankle pumps: increased joint mobility and ROM after      Assessment & Plan   CLINICAL IMPRESSIONS  Medical Diagnosis: R ankle sprain    Treatment Diagnosis: R ankle sprain   Impression/Assessment: Patient is a 39 year old female with R ankle complaints.  The following significant findings have been identified: Pain, Decreased joint mobility, Decreased strength, Impaired balance, Inflammation, Impaired muscle performance, and Decreased activity tolerance. These impairments interfere with their ability to perform work tasks, recreational activities, household mobility, and community mobility as compared to previous level of function.     Clinical Decision Making (Complexity):  Clinical Presentation: Stable/Uncomplicated  Clinical Presentation Rationale: based on medical and personal factors listed in PT evaluation  Clinical Decision Making (Complexity): Low complexity    PLAN OF CARE  Treatment Interventions:  Interventions: Manual Therapy, Neuromuscular Re-education, Therapeutic Activity, Therapeutic Exercise    Long Term Goals     PT Goal 1  Goal Identifier: running  Goal Description: Pt will be able to run for 10 min consecutively without increased symptoms  Rationale: to maximize safety and independence  with performance of ADLs and functional tasks;to maximize safety and independence within the home;to maximize safety and independence within the community;to maximize safety and independence with transportation  Target Date: 10/31/24  PT Goal 2  Goal Identifier: balance  Goal Description: Pt will be able to balance on R foot for 30 sec with eyes closed  Rationale: to maximize safety and independence with performance of ADLs and functional tasks;to maximize safety and independence within the home;to maximize safety and independence within the community;to maximize safety and independence with self cares;to maximize safety and independence with transportation  Target Date: 10/31/24      Frequency of Treatment: 1x/week  Duration of Treatment: 6 weeks    Recommended Referrals to Other Professionals:  none  Education Assessment:   Learner/Method: Patient;No Barriers to Learning    Risks and benefits of evaluation/treatment have been explained.   Patient/Family/caregiver agrees with Plan of Care.     Evaluation Time:     PT Eval, Low Complexity Minutes (70111): 19       Signing Clinician: Rahul Sanchez PT, Cathryn Buchanan, SPT

## 2024-09-30 ENCOUNTER — THERAPY VISIT (OUTPATIENT)
Dept: PHYSICAL THERAPY | Facility: CLINIC | Age: 39
End: 2024-09-30
Payer: COMMERCIAL

## 2024-09-30 DIAGNOSIS — M25.571 CHRONIC PAIN OF RIGHT ANKLE: Primary | ICD-10-CM

## 2024-09-30 DIAGNOSIS — G89.29 CHRONIC PAIN OF RIGHT ANKLE: Primary | ICD-10-CM

## 2024-09-30 PROCEDURE — 97112 NEUROMUSCULAR REEDUCATION: CPT | Mod: GP

## 2024-09-30 PROCEDURE — 97110 THERAPEUTIC EXERCISES: CPT | Mod: GP

## 2024-10-24 ENCOUNTER — THERAPY VISIT (OUTPATIENT)
Dept: PHYSICAL THERAPY | Facility: CLINIC | Age: 39
End: 2024-10-24
Payer: COMMERCIAL

## 2024-10-24 DIAGNOSIS — G89.29 CHRONIC PAIN OF RIGHT ANKLE: Primary | ICD-10-CM

## 2024-10-24 DIAGNOSIS — M25.571 CHRONIC PAIN OF RIGHT ANKLE: Primary | ICD-10-CM

## 2024-10-24 PROCEDURE — 97110 THERAPEUTIC EXERCISES: CPT | Mod: GP | Performed by: PHYSICAL THERAPIST

## 2024-10-24 PROCEDURE — 97112 NEUROMUSCULAR REEDUCATION: CPT | Mod: GP | Performed by: PHYSICAL THERAPIST

## 2024-11-13 ENCOUNTER — ALLIED HEALTH/NURSE VISIT (OUTPATIENT)
Dept: FAMILY MEDICINE | Facility: CLINIC | Age: 39
End: 2024-11-13
Payer: COMMERCIAL

## 2024-11-13 VITALS — TEMPERATURE: 98.5 F

## 2024-11-13 DIAGNOSIS — Z23 ENCOUNTER FOR IMMUNIZATION: Primary | ICD-10-CM

## 2024-11-13 NOTE — PROGRESS NOTES
Prior to immunization administration, verified patients identity using patient s name and date of birth. Please see Immunization Activity for additional information.     Is the patient's temperature normal (100.5 or less)? Yes     Patient MEETS CRITERIA. PROCEED with vaccine administration.          11/13/2024   COVID   Have you had myocarditis or pericarditis (inflammation of or around the heart muscle) after getting a COVID-19 vaccine? No   Have you had a serious reaction to a COVID vaccine or something in a COVID vaccine, like polyethylene glycol (PEG) or polysorbate? No   Have you had multisystem inflammatory syndrome from COVID-19 in the past 90 days? No   Have you received a bone marrow transplant within the previous 3 months? No            Patient MEETS CRITERIA. PROCEED with vaccine administration.            11/13/2024   INFLUENZA   Would you like to receive the flu shot or the nasal flu vaccine today? Flu Shot   Have you had a serious reaction to a flu vaccine or something in a flu vaccine? No   Have you had Guillain-[place] syndrome within 6 weeks of getting a vaccine? No   Have you received a bone marrow transplant within the previous 6 months? No            Patient MEETS CRITERIA. PROCEED with vaccine administration.        Patient instructed to remain in clinic for 15 minutes afterwards, and to report any adverse reactions.      Link to Ancillary Visit Immunization Standing Orders SmartSet     Screening performed by Tammy Bae CMA on 11/13/2024 at 11:14 AM.

## 2024-11-15 ENCOUNTER — THERAPY VISIT (OUTPATIENT)
Dept: PHYSICAL THERAPY | Facility: CLINIC | Age: 39
End: 2024-11-15
Payer: COMMERCIAL

## 2024-11-15 DIAGNOSIS — M25.571 CHRONIC PAIN OF RIGHT ANKLE: Primary | ICD-10-CM

## 2024-11-15 DIAGNOSIS — G89.29 CHRONIC PAIN OF RIGHT ANKLE: Primary | ICD-10-CM

## 2024-11-15 PROCEDURE — 97110 THERAPEUTIC EXERCISES: CPT | Mod: GP | Performed by: PHYSICAL THERAPIST

## 2024-11-15 PROCEDURE — 97112 NEUROMUSCULAR REEDUCATION: CPT | Mod: GP | Performed by: PHYSICAL THERAPIST

## 2024-11-15 PROCEDURE — 97530 THERAPEUTIC ACTIVITIES: CPT | Mod: GP | Performed by: PHYSICAL THERAPIST

## 2024-11-18 PROBLEM — M25.571 CHRONIC PAIN OF RIGHT ANKLE: Status: RESOLVED | Noted: 2024-09-19 | Resolved: 2024-11-18

## 2024-11-18 PROBLEM — G89.29 CHRONIC PAIN OF RIGHT ANKLE: Status: RESOLVED | Noted: 2024-09-19 | Resolved: 2024-11-18

## 2024-11-18 NOTE — PROGRESS NOTES
11/15/24 0500   Appointment Info   Signing clinician's name / credentials Rahul Sanchez DPT   Total/Authorized Visits 6   Visits Used 4   Medical Diagnosis R ankle sprain   PT Tx Diagnosis R ankle sprain   Progress Note/Certification   Onset of illness/injury or Date of Surgery 08/28/24   Therapy Frequency 1x/week   Predicted Duration 6 weeks   Progress Note Completed Date 09/19/24   GOALS   PT Goals 2   PT Goal 1   Goal Identifier running   Goal Description Pt will be able to run for 10 min consecutively without increased symptoms   Rationale to maximize safety and independence with performance of ADLs and functional tasks;to maximize safety and independence within the home;to maximize safety and independence within the community;to maximize safety and independence with transportation   Goal Progress 20 min no pain   Target Date 10/31/24   Date Met 10/24/24   PT Goal 2   Goal Identifier balance   Goal Description Pt will be able to balance on R foot for 30 sec with eyes closed   Rationale to maximize safety and independence with performance of ADLs and functional tasks;to maximize safety and independence within the home;to maximize safety and independence within the community;to maximize safety and independence with self cares;to maximize safety and independence with transportation   Goal Progress able todo 30 sec once her woke her muscles up after a couple of reps   Target Date 10/31/24   Date Met 11/15/24   Subjective Report   Subjective Report she has been doing miuch better, her challenge has been her recovery from her runs, she has been able to run for 25 min without pain,   Objective Measures   Objective Measures Objective Measure 1;Objective Measure 2;Objective Measure 3   Objective Measure 1   Objective Measure single leg calf raises   Details R: 14, L 19, then mod tightness, good fatigue level   Objective Measure 2   Objective Measure posterior fibular glide   Details good motion today   Objective  Measure 3   Objective Measure AROM R ankle   Details DF 17, PF 53   Treatment Interventions (PT)   Interventions Therapeutic Procedure/Exercise;Neuromuscular Re-education;Manual Therapy;Therapeutic Activity   Therapeutic Procedure/Exercise   Therapeutic Procedures: strength, endurance, ROM, flexibility minutes (12102) 15   Ther Proc 1 Bike, seat 4, resistance 8   Ther Proc 1 - Details 5 min   Ther Proc 2 single limb calf raise   Ther Proc 2 - Details R 14, L 19   PTRx Ther Proc 1 single limb knees flexed (soleus)   PTRx Ther Proc 1 - Details 3x10 b   PTRx Ther Proc 2 leaning against wall toe raise   PTRx Ther Proc 2 - Details 3x15   Skilled Intervention assessing muscle control and strength, finalizing HEP   Patient Response/Progress strength improving, tolerating more reps of exercsies   Therapeutic Activity   Therapeutic Activities: dynamic activities to improve functional performance minutes (90931) 8   Ther Act 1 Pt set up with a return to running program, review ideas of cross training, rest days, discussed how to add strengthening program into a running program to continue to see advancements, discussed how to progress mileage   Ther Act 1 - Details 8 min   Patient Response/Progress verbalizes understanding   Neuromuscular Re-education   Neuromuscular re-ed of mvmt, balance, coord, kinesthetic sense, posture, proprioception minutes (92236) 15   Neuromuscular Re-education Neuro Re-ed 2;Neuro Re-ed 3   Neuro Re-ed 1 SLS eyes closed   Neuro Re-ed 1 - Details 2x30 sec work   Neuro Re-ed 2 tandem gait foward/backwards   Neuro Re-ed 2 - Details 2x6m each   Neuro Re-ed 3 SLS with heel/toe taps   Neuro Re-ed 3 - Details x 1min B   Skilled Intervention assessing balance, cues for proper wt shifts   Patient Response/Progress balance improving, good balance with eyes open, still challenges with eyes closed   Manual Therapy   Manual Therapy Manual Therapy 2   Education   Learner/Method Patient;No Barriers to Learning    Plan   Home program see PTRX   Updates to plan of care lilian   Plan for next session d/c Pt   Total Session Time   Timed Code Treatment Minutes 38   Total Treatment Time (sum of timed and untimed services) 38       DISCHARGE  Reason for Discharge: Patient has met all goals.    Equipment Issued: none    Discharge Plan: Patient to continue home program.    Referring Provider:  Kendrick Moon     independent

## 2024-12-01 SDOH — HEALTH STABILITY: PHYSICAL HEALTH: ON AVERAGE, HOW MANY DAYS PER WEEK DO YOU ENGAGE IN MODERATE TO STRENUOUS EXERCISE (LIKE A BRISK WALK)?: 4 DAYS

## 2024-12-01 SDOH — HEALTH STABILITY: PHYSICAL HEALTH: ON AVERAGE, HOW MANY MINUTES DO YOU ENGAGE IN EXERCISE AT THIS LEVEL?: 30 MIN

## 2024-12-01 ASSESSMENT — SOCIAL DETERMINANTS OF HEALTH (SDOH): HOW OFTEN DO YOU GET TOGETHER WITH FRIENDS OR RELATIVES?: ONCE A WEEK

## 2024-12-05 ENCOUNTER — OFFICE VISIT (OUTPATIENT)
Dept: FAMILY MEDICINE | Facility: CLINIC | Age: 39
End: 2024-12-05
Attending: FAMILY MEDICINE
Payer: COMMERCIAL

## 2024-12-05 VITALS
SYSTOLIC BLOOD PRESSURE: 118 MMHG | HEART RATE: 82 BPM | BODY MASS INDEX: 40.97 KG/M2 | RESPIRATION RATE: 18 BRPM | OXYGEN SATURATION: 100 % | DIASTOLIC BLOOD PRESSURE: 78 MMHG | TEMPERATURE: 97.5 F | WEIGHT: 261 LBS | HEIGHT: 67 IN

## 2024-12-05 DIAGNOSIS — E66.01 MORBID OBESITY (H): ICD-10-CM

## 2024-12-05 DIAGNOSIS — R73.03 PREDIABETES: ICD-10-CM

## 2024-12-05 DIAGNOSIS — I10 BENIGN ESSENTIAL HYPERTENSION: ICD-10-CM

## 2024-12-05 DIAGNOSIS — Z13.220 LIPID SCREENING: ICD-10-CM

## 2024-12-05 DIAGNOSIS — Z00.00 ROUTINE GENERAL MEDICAL EXAMINATION AT A HEALTH CARE FACILITY: Primary | ICD-10-CM

## 2024-12-05 DIAGNOSIS — Z83.49 FAMILY HISTORY OF AMYLOIDOSIS: ICD-10-CM

## 2024-12-05 LAB
ANION GAP SERPL CALCULATED.3IONS-SCNC: 13 MMOL/L (ref 7–15)
BUN SERPL-MCNC: 8.2 MG/DL (ref 6–20)
CALCIUM SERPL-MCNC: 9.2 MG/DL (ref 8.8–10.4)
CHLORIDE SERPL-SCNC: 99 MMOL/L (ref 98–107)
CHOLEST SERPL-MCNC: 240 MG/DL
CREAT SERPL-MCNC: 0.93 MG/DL (ref 0.51–0.95)
EGFRCR SERPLBLD CKD-EPI 2021: 81 ML/MIN/1.73M2
EST. AVERAGE GLUCOSE BLD GHB EST-MCNC: 128 MG/DL
FASTING STATUS PATIENT QL REPORTED: YES
FASTING STATUS PATIENT QL REPORTED: YES
GLUCOSE SERPL-MCNC: 114 MG/DL (ref 70–99)
HBA1C MFR BLD: 6.1 %
HCO3 SERPL-SCNC: 25 MMOL/L (ref 22–29)
HDLC SERPL-MCNC: 37 MG/DL
LDLC SERPL CALC-MCNC: 171 MG/DL
NONHDLC SERPL-MCNC: 203 MG/DL
POTASSIUM SERPL-SCNC: 3.7 MMOL/L (ref 3.4–5.3)
SODIUM SERPL-SCNC: 137 MMOL/L (ref 135–145)
TRIGL SERPL-MCNC: 159 MG/DL

## 2024-12-05 RX ORDER — LISINOPRIL AND HYDROCHLOROTHIAZIDE 10; 12.5 MG/1; MG/1
1 TABLET ORAL DAILY
Qty: 90 TABLET | Refills: 4 | Status: SHIPPED | OUTPATIENT
Start: 2024-12-05

## 2024-12-05 ASSESSMENT — ENCOUNTER SYMPTOMS: COUGH: 1

## 2024-12-05 ASSESSMENT — PAIN SCALES - GENERAL: PAINLEVEL_OUTOF10: NO PAIN (0)

## 2024-12-05 NOTE — PROGRESS NOTES
Preventive Care Visit  [facility]  Yolanda Hale PA-C, Family Medicine  Dec 5, 2024        Rebecca Roberts is a 39 year old, presenting for the following:  Physical and Cough (X10 days)        12/5/2024     8:23 AM   Additional Questions   Roomed by Leydi Nascimento    Dry cough and hoarse voice over the last 10 days. Feels really good otherwise. Just wants to make sure throat and lungs look good.     Patient presents today for follow-up of blood pressure. Numbers have been well controlled. Tolerating medications well without side effects. Monitoring salt in diet. Patient denies headaches, vision changes, chest pain, shortness of breath or paresthesias.    Actively working on weight loss. Down 24 pounds since last year. Working with a .     Mother with several valve replacements but more recently diagnosed with Amyloidosis.     Health Care Directive  Patient does not have a Health Care Directive: Patient states has Advance Directive and will bring in a copy to clinic.      12/1/2024   General Health   How would you rate your overall physical health? Good   Feel stress (tense, anxious, or unable to sleep) Not at all            12/1/2024   Nutrition   Three or more servings of calcium each day? Yes   Diet: Carbohydrate counting   How many servings of fruit and vegetables per day? (!) 2-3   How many sweetened beverages each day? 0-1            12/1/2024   Exercise   Days per week of moderate/strenous exercise 4 days   Average minutes spent exercising at this level 30 min            12/1/2024   Social Factors   Frequency of gathering with friends or relatives Once a week   Worry food won't last until get money to buy more No   Food not last or not have enough money for food? No   Do you have housing? (Housing is defined as stable permanent housing and does not include staying ouside in a car, in a tent, in an abandoned building, in an overnight shelter, or  couch-surfing.) Yes   Are you worried about losing your housing? No   Lack of transportation? No   Unable to get utilities (heat,electricity)? No            12/1/2024   Dental   Dentist two times every year? Yes            12/1/2024   TB Screening   Were you born outside of the [place]? No              Today's PHQ-2 Score:       1/17/2024     6:44 AM   PHQ-2 ( 1999 [employer])   Q1: Little interest or pleasure in doing things 0    Q2: Feeling down, depressed or hopeless 0    PHQ-2 Score 0   Q1: Little interest or pleasure in doing things Not at all   Q2: Feeling down, depressed or hopeless Not at all   PHQ-2 Score 0       Patient-reported         12/1/2024   Substance Use   Alcohol more than 3/day or more than 7/wk No   Do you use any other substances recreationally? No        Social History     Tobacco Use    Smoking status: Never    Smokeless tobacco: Never   Vaping Use    Vaping status: Never Used   Substance Use Topics    Alcohol use: Yes     Alcohol/week: 1.7 standard drinks of alcohol     Comment: Alcoholic Drinks/day: 1-2drinks per week    Drug use: No          Mammogram Screening - Patient under 40 years of age: Routine Mammogram Screening not recommended.         12/1/2024   STI Screening   New sexual partner(s) since last STI/HIV test? No        History of abnormal Pap smear: No - age 30- 64 PAP with HPV every 5 years recommended        Latest Ref Rng & Units 11/30/2023     8:37 AM 1/4/2019     3:45 PM 10/20/2015     8:16 AM   PAP / HPV   PAP  Negative for Intraepithelial Lesion or Malignancy (NILM)  Negative for squamous intraepithelial lesion or malignancy  Electronically signed by Phyllis Roblero CT (ASCP) on 1/11/2019 at  2:43 PM    Negative for squamous intraepithelial lesion or malignancy  Electronically signed by Jose Pierre CT (ASCP) on 11/2/2015 at  4:34 PM      HPV 16 DNA Negative Negative  Negative     HPV 18 DNA Negative Negative  Negative     Other HR HPV Negative Negative  Negative    "          12/1/2024   Contraception/Family Planning   Questions about contraception or family planning No           Reviewed and updated as needed this visit by Provider                      Review of Systems  Constitutional, HEENT, cardiovascular, pulmonary, GI, , musculoskeletal, neuro, skin, endocrine and psych systems are negative, except as otherwise noted.     Objective    Exam  /78   Pulse 82   Temp 97.5  F (36.4  C) (Temporal)   Resp 18   Ht 1.705 m (5' 7.13\")   Wt 118.4 kg (261 lb)   LMP 11/28/2024 (Exact Date)   SpO2 100%   BMI 40.73 kg/m     Estimated body mass index is 40.73 kg/m  as calculated from the following:    Height as of this encounter: 1.705 m (5' 7.13\").    Weight as of this encounter: 118.4 kg (261 lb).    Physical Exam  GENERAL: alert and no distress  EYES: Eyes grossly normal to inspection, PERRL and conjunctivae and sclerae normal  HENT: ear canals and TM's normal, nose and mouth without ulcers or lesions  NECK: no adenopathy, no asymmetry, masses, or scars  RESP: lungs clear to auscultation - no rales, rhonchi or wheezes  CV: regular rate and rhythm, normal S1 S2, no S3 or S4, no murmur, click or rub, no peripheral edema  ABDOMEN: soft, nontender, no hepatosplenomegaly, no masses and bowel sounds normal  MS: no gross musculoskeletal defects noted, no edema  SKIN: no suspicious lesions or rashes  NEURO: Normal strength and tone, mentation intact and speech normal  PSYCH: mentation appears normal, affect normal/bright    Assessment & Plan     Routine general medical examination at a health care facility  Vaccinations reviewed and declined at this time.    Morbid obesity (H)  Really working on weight loss. Congratulated her on this effort. Encouraged ongoing diet and exercise modifications.     Benign essential hypertension  Stable. Continue current treatment without change.   - Basic metabolic panel  (Ca, Cl, CO2, Creat, Gluc, K, Na, BUN); Future  - " "lisinopril-hydrochlorothiazide (ZESTORETIC) 10-12.5 MG tablet; Take 1 tablet by mouth daily.  - Echocardiogram Complete; Future  - Basic metabolic panel  (Ca, Cl, CO2, Creat, Gluc, K, Na, BUN)    Prediabetes  - Hemoglobin A1c; Future  - Hemoglobin A1c    Lipid screening  - Lipid panel reflex to direct LDL Fasting; Future  - Lipid panel reflex to direct LDL Fasting    Family history of amyloidosis  Recommended echocardiogram.   - Echocardiogram Complete; Future    Patient has been advised of split billing requirements and indicates understanding: Yes        BMI  Estimated body mass index is 40.73 kg/m  as calculated from the following:    Height as of this encounter: 1.705 m (5' 7.13\").    Weight as of this encounter: 118.4 kg (261 lb).   Weight management plan: Discussed healthy diet and exercise guidelines    Counseling  Appropriate preventive services were addressed with this patient via screening, questionnaire, or discussion as appropriate for fall prevention, nutrition, physical activity, Tobacco-use cessation, social engagement, weight loss and cognition.  Checklist reviewing preventive services available has been given to the patient.  Reviewed patient's diet, addressing concerns and/or questions.       Signed Electronically by: Yolanda Hale PA-C    "

## 2025-01-10 ENCOUNTER — HOSPITAL ENCOUNTER (OUTPATIENT)
Dept: CARDIOLOGY | Facility: CLINIC | Age: 40
Discharge: HOME | End: 2025-01-10
Attending: PHYSICIAN ASSISTANT | Admitting: PHYSICIAN ASSISTANT
Payer: COMMERCIAL

## 2025-01-10 DIAGNOSIS — I10 BENIGN ESSENTIAL HYPERTENSION: ICD-10-CM

## 2025-01-10 DIAGNOSIS — Z83.49 FAMILY HISTORY OF AMYLOIDOSIS: ICD-10-CM

## 2025-01-10 LAB — LVEF ECHO: NORMAL

## 2025-01-10 PROCEDURE — 93306 TTE W/DOPPLER COMPLETE: CPT | Mod: 26 | Performed by: INTERNAL MEDICINE

## 2025-01-10 PROCEDURE — 93306 TTE W/DOPPLER COMPLETE: CPT

## 2025-03-03 ENCOUNTER — MYC REFILL (OUTPATIENT)
Dept: FAMILY MEDICINE | Facility: CLINIC | Age: 40
End: 2025-03-03
Payer: COMMERCIAL

## 2025-03-03 DIAGNOSIS — I10 BENIGN ESSENTIAL HYPERTENSION: ICD-10-CM

## 2025-03-03 RX ORDER — LISINOPRIL AND HYDROCHLOROTHIAZIDE 10; 12.5 MG/1; MG/1
1 TABLET ORAL DAILY
Qty: 90 TABLET | Refills: 4 | OUTPATIENT
Start: 2025-03-03

## 2025-04-29 ENCOUNTER — THERAPY VISIT (OUTPATIENT)
Dept: PHYSICAL THERAPY | Facility: CLINIC | Age: 40
End: 2025-04-29
Payer: COMMERCIAL

## 2025-04-29 DIAGNOSIS — S93.491A SPRAIN OF ANTERIOR TALOFIBULAR LIGAMENT OF RIGHT ANKLE: Primary | ICD-10-CM

## 2025-04-29 DIAGNOSIS — M25.571 ACUTE RIGHT ANKLE PAIN: ICD-10-CM

## 2025-04-29 PROCEDURE — 97161 PT EVAL LOW COMPLEX 20 MIN: CPT | Mod: GP | Performed by: PHYSICAL THERAPIST

## 2025-04-29 PROCEDURE — 97530 THERAPEUTIC ACTIVITIES: CPT | Mod: GP | Performed by: PHYSICAL THERAPIST

## 2025-04-29 PROCEDURE — 97110 THERAPEUTIC EXERCISES: CPT | Mod: GP | Performed by: PHYSICAL THERAPIST

## 2025-04-29 NOTE — PROGRESS NOTES
04/29/25 0500   Appointment Info   Signing clinician's name / credentials Rahul Sanchez DPT   Total/Authorized Visits 8   Visits Used 5   Medical Diagnosis R ankle sprain   PT Tx Diagnosis R ankle sprain   Progress Note/Certification   Onset of illness/injury or Date of Surgery 08/28/24   Therapy Frequency 1x/2 weeks   Predicted Duration 6 weeks   Progress Note Completed Date 11/15/24   GOALS   PT Goals 2;3   PT Goal 1   Goal Identifier running   Goal Description Pt will be able to run for 10 min consecutively without increased symptoms   Rationale to maximize safety and independence with performance of ADLs and functional tasks;to maximize safety and independence within the home;to maximize safety and independence within the community;to maximize safety and independence with transportation   Goal Progress 20 min no pain   Target Date 10/31/24   Date Met 10/24/24   PT Goal 2   Goal Identifier balance   Goal Description Pt will be able to balance on R foot for 30 sec with eyes closed   Rationale to maximize safety and independence with performance of ADLs and functional tasks;to maximize safety and independence within the home;to maximize safety and independence within the community;to maximize safety and independence with self cares;to maximize safety and independence with transportation   Goal Progress able todo 30 sec once her woke her muscles up after a couple of reps   Target Date 10/31/24   Date Met 11/15/24   PT Goal 3   Goal Description Pt will be able to run 30 min, pain 2/10   Rationale to maximize safety and independence within the community;to maximize safety and independence with self cares   Target Date 06/03/25   Subjective Report   Subjective Report she has been working her couch to 5K routine, she has started to develop some pain in her anterolateral shin pain, it seems to hit both leg evenly, it will typically get tight with jogging ~1k, she will still get a bit of swelling in her R ankle with  her jogging as well, no pain with calf raises, squats or stairs, she has tried different shoes without relief. pain 0-7/10   Objective Measures   Objective Measures Objective Measure 1;Objective Measure 2;Objective Measure 3;Objective Measure 4;Objective Measure 5   Objective Measure 1   Objective Measure single leg calf raises   Details R: 22, L 23   Objective Measure 2   Objective Measure MMT:   Details DF/inv: 5/5 B, DF/ever 5/5 B, PF/inv 4-/5 B, PF/ever 4/5 B   Objective Measure 3   Objective Measure AROM ankle   Details DF: R 14, L 11, PF R 45, L 47   Objective Measure 4   Objective Measure palpation   Details tender R>L ficularis muscles - especially at musculotendionous junction   Objective Measure 5   Objective Measure running assessment   Details scissoring step on L, stays on lateral boarder of R foot in stance, pelvic collapse L stance, vaulting   Treatment Interventions (PT)   Interventions Therapeutic Procedure/Exercise;Neuromuscular Re-education;Manual Therapy;Therapeutic Activity   Therapeutic Procedure/Exercise   Therapeutic Procedures: strength, endurance, ROM, flexibility minutes (75768) 18   Ther Proc 1 single limb calf raise   Ther Proc 1 - Details R 22, L 23   Ther Proc 2 ankle PF/inv and PF/ever blue TB   Ther Proc 2 - Details x 20 each B   PTRx Ther Proc 1 s/l hip abd   PTRx Ther Proc 1 - Details x 10 B   PTRx Ther Proc 2 iso gastroc with heels hanging off end of step   PTRx Ther Proc 2 - Details x 45 sec   Skilled Intervention cues for proper form to avoid compensation with ankle TB exercises and s/l hip abd   Patient Response/Progress good level of fatigue, no pain with exercises   Therapeutic Activity   Therapeutic Activities: dynamic activities to improve functional performance minutes (37025) 8   Ther Act 1 video assessment of running form, discussed her compensation patterns when she is running, set up with a lateral heel wedge for R foot to promote pronation of R foot,  discussed  interval training with desired duration/distance for intervals, discussed how to monitor symptoms with running to avoid causing increased pain/symptoms   Ther Act 1 - Details x 8 min   Neuromuscular Re-education   Neuromuscular Re-education Neuro Re-ed 2;Neuro Re-ed 3   Manual Therapy   Manual Therapy Manual Therapy 2   Eval/Assessments   Assessments PT Re-Eval   PT Eval, Low Complexity Minutes (87923) 15   Education   Learner/Method Patient;No Barriers to Learning   Plan   Home program see PTRX   Updates to plan of care see PTRx   Plan for next session bernardo in 2 weeks, pr ogress pelvic stab, ankle stabilizers, work running form   Total Session Time   Timed Code Treatment Minutes 26   Total Treatment Time (sum of timed and untimed services) 41       PLAN  Continue therapy per current plan of care.    Beginning/End Dates of Progress Note Reporting Period:  11/15/24 to 04/29/2025    Referring Provider:  Kendrick Moon DPM

## (undated) DEVICE — LUBRICATING JELLY 2.7GM T00137

## (undated) DEVICE — NDL COUNTER 20CT 31142493

## (undated) DEVICE — SYR BULB IRRIG 50ML LATEX FREE 0035280

## (undated) DEVICE — Device

## (undated) DEVICE — SPONGE RAY-TEC 4X8" 7318

## (undated) DEVICE — PREP POVIDONE IODINE USP 7.5% CLEANING SOL 64538161

## (undated) DEVICE — SUCTION TIP YANKAUER W/O VENT K86

## (undated) DEVICE — DRAPE GYN/UROLOGY FLUID POUCH TUR 29455

## (undated) DEVICE — TUBING SUCTION 10'X3/16" N510

## (undated) DEVICE — PREP POVIDONE IODINE USP 10% TOPICAL SOL 64537161

## (undated) DEVICE — DRAPE POUCH IRR 1016

## (undated) DEVICE — SOL NACL 0.9% IRRIG 1000ML BOTTLE 2F7124

## (undated) DEVICE — PREP SKIN SCRUB TRAY 4461A

## (undated) DEVICE — LIGHT HANDLE X2

## (undated) DEVICE — CATH TRAY FOLEY 16FR BARDEX W/DRAIN BAG STATLOCK 300316A

## (undated) DEVICE — SOL WATER IRRIG 1000ML BOTTLE 07139-09